# Patient Record
Sex: MALE | Race: WHITE | NOT HISPANIC OR LATINO | Employment: FULL TIME | URBAN - METROPOLITAN AREA
[De-identification: names, ages, dates, MRNs, and addresses within clinical notes are randomized per-mention and may not be internally consistent; named-entity substitution may affect disease eponyms.]

---

## 2022-11-07 ENCOUNTER — HOSPITAL ENCOUNTER (EMERGENCY)
Facility: CLINIC | Age: 62
Discharge: HOME | End: 2022-11-07
Attending: EMERGENCY MEDICINE | Admitting: EMERGENCY MEDICINE

## 2022-11-07 ENCOUNTER — APPOINTMENT (OUTPATIENT)
Dept: ULTRASOUND IMAGING | Facility: CLINIC | Age: 62
End: 2022-11-07
Attending: EMERGENCY MEDICINE

## 2022-11-07 VITALS
DIASTOLIC BLOOD PRESSURE: 90 MMHG | TEMPERATURE: 98 F | HEART RATE: 87 BPM | SYSTOLIC BLOOD PRESSURE: 145 MMHG | WEIGHT: 150 LBS | OXYGEN SATURATION: 98 % | RESPIRATION RATE: 18 BRPM

## 2022-11-07 DIAGNOSIS — L03.115 CELLULITIS OF RIGHT LEG: ICD-10-CM

## 2022-11-07 DIAGNOSIS — T16.1XXA FOREIGN BODY OF RIGHT EAR, INITIAL ENCOUNTER: ICD-10-CM

## 2022-11-07 PROCEDURE — 76882 US LMTD JT/FCL EVL NVASC XTR: CPT | Mod: RT

## 2022-11-07 PROCEDURE — 99284 EMERGENCY DEPT VISIT MOD MDM: CPT | Mod: 25

## 2022-11-07 PROCEDURE — 99284 EMERGENCY DEPT VISIT MOD MDM: CPT | Performed by: EMERGENCY MEDICINE

## 2022-11-07 RX ORDER — SULFAMETHOXAZOLE/TRIMETHOPRIM 800-160 MG
2 TABLET ORAL 2 TIMES DAILY
COMMUNITY
End: 2023-12-03

## 2022-11-07 RX ORDER — MECLIZINE HCL 25MG 25 MG/1
25 TABLET, CHEWABLE ORAL EVERY 6 HOURS PRN
Status: ON HOLD | COMMUNITY
End: 2024-01-28

## 2022-11-07 RX ORDER — SULFAMETHOXAZOLE/TRIMETHOPRIM 800-160 MG
1 TABLET ORAL 2 TIMES DAILY
Qty: 20 TABLET | Refills: 0 | Status: SHIPPED | OUTPATIENT
Start: 2022-11-07 | End: 2022-11-17

## 2022-11-07 ASSESSMENT — ACTIVITIES OF DAILY LIVING (ADL): ADLS_ACUITY_SCORE: 35

## 2022-11-07 NOTE — DISCHARGE INSTRUCTIONS
The ultrasound does not show any abscess that needs to be drained.  You do have infection of the skin called cellulitis.  With your history of MRSA working to cover you with Bactrim DS twice daily for 10 days.  Continue the warm compresses or heating pad to increase the blood flow in the area.  This will promote healing and hopefully clear up the infection faster.  The antibiotic will take a couple of days to be effective so if symptoms are worsening thereafter or you develop high fevers vomiting or other concerns you can return to the ER.

## 2022-11-07 NOTE — ED PROVIDER NOTES
History     Chief Complaint   Patient presents with     Foreign Body in Ear     HPI  Tee Escudero is a 62 year old male who presents with concerns for possible piece of his hearing aid in his right ear canal.  Denies ear pain or drainage.  No fever or chills.  No headache.  Other concern is a reddened swollen area of his right lower leg with a previous history of MRSA abscess requiring repeated surgeries and 3 weeks of IV antibiotics.  Denies fever or chills.  No injury.  Has been using hot pack over the area with improvement in some of the redness.  No other rash or skin lesions are evident.     Allergies:  No Known Allergies    Problem List:    There are no problems to display for this patient.       Past Medical History:    No past medical history on file.    Past Surgical History:    No past surgical history on file.    Family History:    No family history on file.    Social History:  Marital Status:  Single [1]        Medications:    meclizine 25 MG CHEW  sulfamethoxazole-trimethoprim (BACTRIM DS) 800-160 MG tablet  sulfamethoxazole-trimethoprim (BACTRIM DS) 800-160 MG tablet  UNKNOWN TO PATIENT          Review of Systems all other systems are reviewed and are negative.    Physical Exam   BP: (!) 145/90  Pulse: 87  Temp: 98  F (36.7  C)  Resp: 18  Weight: 68 kg (150 lb)  SpO2: 98 %      Physical Exam General alert cooperative male in mild distress.  HEENT reveals a foreign body in the right ear.  No signs of infection.  On his right leg he has a reddened tender area that is nonfluctuant.  Does not involve the joint.  No drainage.  No lymphangitic spread.        ED Course                 Procedures              Critical Care time:  none               Results for orders placed or performed during the hospital encounter of 11/07/22 (from the past 24 hour(s))   US Lower Extremity Non Vascular Right    Narrative    ULTRASOUND RIGHT LOWER EXTREMITY NON VASCULAR November 7, 2022 2:59 PM      HISTORY: Right lower  leg swelling with history of MRSA /rule out  abscess.    COMPARISON: None.     FINDINGS: Limited ultrasound of the right lateral knee area of redness  demonstrates subcutaneous edema and heterogeneously hypervascular  hypoechoic area which could represent cellulitis. Minimal fluid is  present but no drainable abscess is seen. One nodular area in this  region measures 1.1 x 0.7 x 0.8 cm.      Impression    IMPRESSION: Edema and hypervascularity in the subcutaneous adipose  tissues with one nodular area measuring up to 1.1 x 0.7 x 0.8 cm. This  likely represents an inflammatory or infectious process (cellulitis)  but no drainable abscess is seen.       Medications - No data to display  Ear was irrigated to remove foreign body.  Ultrasound of the right leg is ordered.  Assessments & Plan (with Medical Decision Making)   Tee Escudero is a 62 year old male who presents with concerns for possible piece of his hearing aid in his right ear canal.  Denies ear pain or drainage.  No fever or chills.  No headache.  Other concern is a reddened swollen area of his right lower leg with a previous history of MRSA abscess requiring repeated surgeries and 3 weeks of IV antibiotics.  Denies fever or chills.  No injury.  Has been using hot pack over the area with improvement in some of the redness.  No other rash or skin lesions are evident.  Presentation patient was afebrile and vitally stable.  On exam there is a foreign body in the right ear canal but right by the TM.  Will be difficult to reach with a alligator forceps.  Irrigation.  On his right leg he does have a tender reddened area but it is nonfluctuant.  With a previous history of MRSA abscess and ultrasound was obtained to look for drainable lesion and this showed no drainable abscess.  With a alligator clip I was able to remove the foreign body for several year without any additional damage or persistent foreign body.  With history of MRSA we will cover him with Bactrim  twice daily for 10 days.  Continue hot packs.  Follow-up if worsening symptoms.  I have reviewed the nursing notes.    I have reviewed the findings, diagnosis, plan and need for follow up with the patient.       New Prescriptions    SULFAMETHOXAZOLE-TRIMETHOPRIM (BACTRIM DS) 800-160 MG TABLET    Take 1 tablet by mouth 2 times daily for 10 days       Final diagnoses:   Foreign body of right ear, initial encounter   Cellulitis of right leg       11/7/2022   [facility] EMERGENCY DEPT     [place] MD  11/07/22 0802

## 2023-12-03 ENCOUNTER — HOSPITAL ENCOUNTER (EMERGENCY)
Facility: CLINIC | Age: 63
Discharge: HOME | End: 2023-12-03
Attending: PHYSICIAN ASSISTANT | Admitting: PHYSICIAN ASSISTANT

## 2023-12-03 VITALS
HEART RATE: 76 BPM | TEMPERATURE: 97.3 F | BODY MASS INDEX: 28.04 KG/M2 | HEIGHT: 71 IN | RESPIRATION RATE: 20 BRPM | DIASTOLIC BLOOD PRESSURE: 95 MMHG | WEIGHT: 200.3 LBS | OXYGEN SATURATION: 92 % | SYSTOLIC BLOOD PRESSURE: 145 MMHG

## 2023-12-03 DIAGNOSIS — L02.416 ABSCESS OF LEFT LOWER LEG: ICD-10-CM

## 2023-12-03 PROCEDURE — 10060 I&D ABSCESS SIMPLE/SINGLE: CPT | Mod: LT | Performed by: PHYSICIAN ASSISTANT

## 2023-12-03 PROCEDURE — 99284 EMERGENCY DEPT VISIT MOD MDM: CPT | Mod: 25 | Performed by: PHYSICIAN ASSISTANT

## 2023-12-03 PROCEDURE — 99283 EMERGENCY DEPT VISIT LOW MDM: CPT | Mod: 25 | Performed by: PHYSICIAN ASSISTANT

## 2023-12-03 RX ORDER — SULFAMETHOXAZOLE/TRIMETHOPRIM 800-160 MG
2 TABLET ORAL 2 TIMES DAILY
Qty: 28 TABLET | Refills: 0 | Status: SHIPPED | OUTPATIENT
Start: 2023-12-03 | End: 2023-12-10

## 2023-12-03 ASSESSMENT — ACTIVITIES OF DAILY LIVING (ADL): ADLS_ACUITY_SCORE: 33

## 2023-12-03 NOTE — ED PROVIDER NOTES
"  History     Chief Complaint   Patient presents with    Wound Check     HPI  Tee Escudero is a 63 year old male who presents to the emergency department for a wound evaluation.  About a week ago he scratched his left anterior lower leg.  He applied antibiotic ointment and has been trying to keep the wound clean but unfortunately he thinks it may have gotten infected.  He has developed increased redness and swelling to the area with increased pain.  He has not had any fevers and otherwise has been doing well.  He does have a history of MRSA infections.    Allergies:  No Known Allergies    Problem List:    There are no problems to display for this patient.       Past Medical History:    No past medical history on file.    Past Surgical History:    No past surgical history on file.    Family History:    No family history on file.    Social History:  Marital Status:  Single [1]        Medications:    sulfamethoxazole-trimethoprim (BACTRIM DS) 800-160 MG tablet  meclizine 25 MG CHEW  UNKNOWN TO PATIENT          Review of Systems   All other systems reviewed and are negative.        Physical Exam   BP: (!) 145/95  Pulse: 76  Temp: 97.3  F (36.3  C)  Resp: 20  Height: 180.3 cm (5' 11\")  Weight: 90.9 kg (200 lb 4.8 oz)  SpO2: 92 %      Physical Exam  Vitals and nursing note reviewed.   Constitutional:       General: He is not in acute distress.     Appearance: Normal appearance. He is not ill-appearing, toxic-appearing or diaphoretic.   HENT:      Head: Normocephalic and atraumatic.      Nose: Nose normal.   Eyes:      Extraocular Movements: Extraocular movements intact.      Conjunctiva/sclera: Conjunctivae normal.   Pulmonary:      Effort: Pulmonary effort is normal. No respiratory distress.   Musculoskeletal:      Cervical back: Neck supple.      Comments: Left lower le cm area of dark erythema with fluctuance to distal shin noted with 4 cm diameter of erythema and warmth noted.   Skin:     General: Skin is warm " and dry.   Neurological:      General: No focal deficit present.      Mental Status: He is alert. Mental status is at baseline.   Psychiatric:         Mood and Affect: Mood normal.           ED Course            [facility]    PROCEDURE: -Incision/Drainage    Date/Time: 12/3/2023 2:07 PM    Performed by: Carmen Cotton PA-C  Authorized by: Carmen Cotton PA-C    Risks, benefits and alternatives discussed.      LOCATION:      Type:  Abscess    Location:  Lower extremity    Lower extremity location:  Leg    Leg location:  L lower leg    PRE-PROCEDURE DETAILS:     Skin preparation:  Chloraprep    PROCEDURE TYPE:     Complexity:  Simple    ANESTHESIA (see MAR for exact dosages):     Anesthesia method:  Local infiltration    Local anesthetic:  Lidocaine 1% w/o epi    PROCEDURE DETAILS:     Incision types:  Stab incision    Incision depth:  Dermal    Scalpel blade:  11    Wound management:  Probed and deloculated    Drainage:  Bloody and purulent    Drainage amount:  Moderate    Wound treatment:  Wound left open    Packing materials:  None    PROCEDURE    Patient Tolerance:  Patient tolerated the procedure well with no immediate complications        No results found for this or any previous visit (from the past 24 hour(s)).    Medications - No data to display      Assessments & Plan (with Medical Decision Making)  Tee Escudero is a 63 year old male who presented to the ED for a wound to his left lower leg.  Injured himself a week ago and has since developed redness and swelling.  On exam today he did have findings of abscess with surrounding cellulitis.  I went over treatment options and he was agreeable to incision and drainage.  This was performed as detailed above with good results.  I will prescribe him Bactrim for additional antibiotic therapy of this cellulitis/abscess given his MRSA history.  Patient was provided instructions on wound cares as well as indications of  when to return to the ED.  All questions answered and patient discharged home in suitable condition.     I have reviewed the nursing notes.    I have reviewed the findings, diagnosis, plan and need for follow up with the patient.      Current Discharge Medication List          Final diagnoses:   Abscess of left lower leg     Note: Chart documentation done in part with Dragon Voice Recognition software. Although reviewed after completion, some word and grammatical errors may remain.     12/3/2023   [facility] EMERGENCY DEPT       Carmen Cotton PA-C  12/03/23 1419

## 2023-12-03 NOTE — ED TRIAGE NOTES
Has a wound to the left ankle area that is red and swollen     Triage Assessment (Adult)       Row Name 12/03/23 1240          Triage Assessment    Airway WDL WDL        Respiratory WDL    Respiratory WDL WDL        Skin Circulation/Temperature WDL    Skin Circulation/Temperature WDL WDL        Cardiac WDL    Cardiac WDL WDL        Peripheral/Neurovascular WDL    Peripheral Neurovascular WDL WDL        Cognitive/Neuro/Behavioral WDL    Cognitive/Neuro/Behavioral WDL WDL

## 2023-12-03 NOTE — DISCHARGE INSTRUCTIONS
Please take the full course of antibiotics to treat the skin infection.  Keep the wound covered with antibiotic ointment and a bandage.  You can soak in a bathtub to help with drainage.  If you have any worsening symptoms please return to the emergency department.    Thank you for choosing [facility]'s Emergency Department. It was a pleasure taking care of you today. If you have any questions, please call [phone].    Carmen Cotton PA-C

## 2024-01-25 ENCOUNTER — APPOINTMENT (OUTPATIENT)
Dept: CT IMAGING | Facility: CLINIC | Age: 64
End: 2024-01-25
Attending: PHYSICIAN ASSISTANT

## 2024-01-25 ENCOUNTER — HOSPITAL ENCOUNTER (EMERGENCY)
Facility: CLINIC | Age: 64
Discharge: OTHER INSTITUTION | End: 2024-01-26
Attending: PHYSICIAN ASSISTANT | Admitting: PHYSICIAN ASSISTANT

## 2024-01-25 DIAGNOSIS — N49.2 CELLULITIS OF SCROTUM: ICD-10-CM

## 2024-01-25 DIAGNOSIS — N13.2 HYDRONEPHROSIS WITH URINARY OBSTRUCTION DUE TO URETERAL CALCULUS: ICD-10-CM

## 2024-01-25 DIAGNOSIS — K61.1 PERIRECTAL CELLULITIS: ICD-10-CM

## 2024-01-25 DIAGNOSIS — N20.1 LEFT URETERAL STONE: ICD-10-CM

## 2024-01-25 LAB
ALBUMIN SERPL BCG-MCNC: 4 G/DL (ref 3.5–5.2)
ALP SERPL-CCNC: 90 U/L (ref 40–150)
ALT SERPL W P-5'-P-CCNC: 24 U/L (ref 0–70)
ANION GAP SERPL CALCULATED.3IONS-SCNC: 16 MMOL/L (ref 7–15)
AST SERPL W P-5'-P-CCNC: 20 U/L (ref 0–45)
BASOPHILS # BLD AUTO: 0.1 10E3/UL (ref 0–0.2)
BASOPHILS NFR BLD AUTO: 0 %
BILIRUB SERPL-MCNC: 0.9 MG/DL
BUN SERPL-MCNC: 27 MG/DL (ref 8–23)
CALCIUM SERPL-MCNC: 9.4 MG/DL (ref 8.8–10.2)
CHLORIDE SERPL-SCNC: 100 MMOL/L (ref 98–107)
CREAT SERPL-MCNC: 2.17 MG/DL (ref 0.67–1.17)
CRP SERPL-MCNC: 177.13 MG/L
DEPRECATED HCO3 PLAS-SCNC: 20 MMOL/L (ref 22–29)
EGFRCR SERPLBLD CKD-EPI 2021: 34 ML/MIN/1.73M2
EOSINOPHIL # BLD AUTO: 0.3 10E3/UL (ref 0–0.7)
EOSINOPHIL NFR BLD AUTO: 1 %
ERYTHROCYTE [DISTWIDTH] IN BLOOD BY AUTOMATED COUNT: 12.7 % (ref 10–15)
GLUCOSE SERPL-MCNC: 91 MG/DL (ref 70–99)
HCT VFR BLD AUTO: 47.2 % (ref 40–53)
HGB BLD-MCNC: 15.8 G/DL (ref 13.3–17.7)
HOLD SPECIMEN: NORMAL
IMM GRANULOCYTES # BLD: 0.2 10E3/UL
IMM GRANULOCYTES NFR BLD: 1 %
LACTATE SERPL-SCNC: 1 MMOL/L (ref 0.7–2)
LYMPHOCYTES # BLD AUTO: 2.8 10E3/UL (ref 0.8–5.3)
LYMPHOCYTES NFR BLD AUTO: 13 %
MCH RBC QN AUTO: 30.3 PG (ref 26.5–33)
MCHC RBC AUTO-ENTMCNC: 33.5 G/DL (ref 31.5–36.5)
MCV RBC AUTO: 90 FL (ref 78–100)
MONOCYTES # BLD AUTO: 2.1 10E3/UL (ref 0–1.3)
MONOCYTES NFR BLD AUTO: 10 %
NEUTROPHILS # BLD AUTO: 16.4 10E3/UL (ref 1.6–8.3)
NEUTROPHILS NFR BLD AUTO: 75 %
NRBC # BLD AUTO: 0 10E3/UL
NRBC BLD AUTO-RTO: 0 /100
PLATELET # BLD AUTO: 231 10E3/UL (ref 150–450)
POTASSIUM SERPL-SCNC: 4.1 MMOL/L (ref 3.4–5.3)
PROT SERPL-MCNC: 7.4 G/DL (ref 6.4–8.3)
RBC # BLD AUTO: 5.22 10E6/UL (ref 4.4–5.9)
SODIUM SERPL-SCNC: 136 MMOL/L (ref 135–145)
WBC # BLD AUTO: 21.7 10E3/UL (ref 4–11)

## 2024-01-25 PROCEDURE — 85025 COMPLETE CBC W/AUTO DIFF WBC: CPT | Performed by: PHYSICIAN ASSISTANT

## 2024-01-25 PROCEDURE — 250N000011 HC RX IP 250 OP 636: Performed by: PHYSICIAN ASSISTANT

## 2024-01-25 PROCEDURE — 86140 C-REACTIVE PROTEIN: CPT | Performed by: PHYSICIAN ASSISTANT

## 2024-01-25 PROCEDURE — 250N000009 HC RX 250: Performed by: PHYSICIAN ASSISTANT

## 2024-01-25 PROCEDURE — 36415 COLL VENOUS BLD VENIPUNCTURE: CPT | Performed by: PHYSICIAN ASSISTANT

## 2024-01-25 PROCEDURE — 81001 URINALYSIS AUTO W/SCOPE: CPT | Performed by: PHYSICIAN ASSISTANT

## 2024-01-25 PROCEDURE — 80053 COMPREHEN METABOLIC PANEL: CPT | Performed by: PHYSICIAN ASSISTANT

## 2024-01-25 PROCEDURE — 74177 CT ABD & PELVIS W/CONTRAST: CPT

## 2024-01-25 PROCEDURE — 99285 EMERGENCY DEPT VISIT HI MDM: CPT | Mod: 25 | Performed by: PHYSICIAN ASSISTANT

## 2024-01-25 PROCEDURE — 258N000003 HC RX IP 258 OP 636: Performed by: PHYSICIAN ASSISTANT

## 2024-01-25 PROCEDURE — 83605 ASSAY OF LACTIC ACID: CPT | Performed by: PHYSICIAN ASSISTANT

## 2024-01-25 PROCEDURE — 99285 EMERGENCY DEPT VISIT HI MDM: CPT | Performed by: PHYSICIAN ASSISTANT

## 2024-01-25 PROCEDURE — 87040 BLOOD CULTURE FOR BACTERIA: CPT | Performed by: PHYSICIAN ASSISTANT

## 2024-01-25 RX ORDER — IOPAMIDOL 755 MG/ML
500 INJECTION, SOLUTION INTRAVASCULAR ONCE
Status: COMPLETED | OUTPATIENT
Start: 2024-01-25 | End: 2024-01-25

## 2024-01-25 RX ORDER — PIPERACILLIN SODIUM, TAZOBACTAM SODIUM 4; .5 G/20ML; G/20ML
4.5 INJECTION, POWDER, LYOPHILIZED, FOR SOLUTION INTRAVENOUS ONCE
Status: COMPLETED | OUTPATIENT
Start: 2024-01-25 | End: 2024-01-26

## 2024-01-25 RX ORDER — VANCOMYCIN HYDROCHLORIDE 500 MG/10ML
500 INJECTION, POWDER, LYOPHILIZED, FOR SOLUTION INTRAVENOUS EVERY 12 HOURS
Status: DISCONTINUED | OUTPATIENT
Start: 2024-01-26 | End: 2024-01-26 | Stop reason: HOSPADM

## 2024-01-25 RX ORDER — CLINDAMYCIN PHOSPHATE 900 MG/50ML
900 INJECTION, SOLUTION INTRAVENOUS ONCE
Status: COMPLETED | OUTPATIENT
Start: 2024-01-25 | End: 2024-01-26

## 2024-01-25 RX ADMIN — SODIUM CHLORIDE 1000 ML: 9 INJECTION, SOLUTION INTRAVENOUS at 22:33

## 2024-01-25 RX ADMIN — IOPAMIDOL 98 ML: 755 INJECTION, SOLUTION INTRAVENOUS at 22:16

## 2024-01-25 RX ADMIN — SODIUM CHLORIDE 60 ML: 9 INJECTION, SOLUTION INTRAVENOUS at 22:17

## 2024-01-25 ASSESSMENT — ACTIVITIES OF DAILY LIVING (ADL)
ADLS_ACUITY_SCORE: 35
ADLS_ACUITY_SCORE: 35

## 2024-01-26 ENCOUNTER — HOSPITAL ENCOUNTER (INPATIENT)
Facility: CLINIC | Age: 64
LOS: 6 days | Discharge: HOME | DRG: 580 | End: 2024-02-01
Attending: HOSPITALIST | Admitting: INTERNAL MEDICINE

## 2024-01-26 ENCOUNTER — TELEPHONE (OUTPATIENT)
Dept: FAMILY MEDICINE | Facility: CLINIC | Age: 64
End: 2024-01-26

## 2024-01-26 VITALS
RESPIRATION RATE: 19 BRPM | BODY MASS INDEX: 28 KG/M2 | SYSTOLIC BLOOD PRESSURE: 105 MMHG | OXYGEN SATURATION: 96 % | DIASTOLIC BLOOD PRESSURE: 68 MMHG | WEIGHT: 200 LBS | TEMPERATURE: 97.9 F | HEART RATE: 75 BPM | HEIGHT: 71 IN

## 2024-01-26 DIAGNOSIS — S31.109A WOUND OF LEFT GROIN, INITIAL ENCOUNTER: ICD-10-CM

## 2024-01-26 DIAGNOSIS — L03.315 CELLULITIS OF PERINEUM: ICD-10-CM

## 2024-01-26 DIAGNOSIS — N20.1 URETERAL STONE: Primary | ICD-10-CM

## 2024-01-26 LAB
ALBUMIN UR-MCNC: NEGATIVE MG/DL
ANION GAP SERPL CALCULATED.3IONS-SCNC: 10 MMOL/L (ref 7–15)
APPEARANCE UR: CLEAR
BILIRUB UR QL STRIP: NEGATIVE
BUN SERPL-MCNC: 22.7 MG/DL (ref 8–23)
CALCIUM SERPL-MCNC: 8.7 MG/DL (ref 8.8–10.2)
CHLORIDE SERPL-SCNC: 103 MMOL/L (ref 98–107)
COLOR UR AUTO: YELLOW
CREAT SERPL-MCNC: 1.62 MG/DL (ref 0.67–1.17)
CREAT SERPL-MCNC: 1.87 MG/DL (ref 0.67–1.17)
DEPRECATED HCO3 PLAS-SCNC: 23 MMOL/L (ref 22–29)
EGFRCR SERPLBLD CKD-EPI 2021: 40 ML/MIN/1.73M2
EGFRCR SERPLBLD CKD-EPI 2021: 48 ML/MIN/1.73M2
ERYTHROCYTE [DISTWIDTH] IN BLOOD BY AUTOMATED COUNT: 12.6 % (ref 10–15)
GLUCOSE SERPL-MCNC: 87 MG/DL (ref 70–99)
GLUCOSE UR STRIP-MCNC: NEGATIVE MG/DL
HCT VFR BLD AUTO: 41.1 % (ref 40–53)
HGB BLD-MCNC: 13.8 G/DL (ref 13.3–17.7)
HGB UR QL STRIP: NEGATIVE
INR PPP: 1.1 (ref 0.85–1.15)
KETONES UR STRIP-MCNC: 5 MG/DL
LEUKOCYTE ESTERASE UR QL STRIP: NEGATIVE
MCH RBC QN AUTO: 30.7 PG (ref 26.5–33)
MCHC RBC AUTO-ENTMCNC: 33.6 G/DL (ref 31.5–36.5)
MCV RBC AUTO: 92 FL (ref 78–100)
MUCOUS THREADS #/AREA URNS LPF: PRESENT /LPF
NITRATE UR QL: NEGATIVE
PH UR STRIP: 5 [PH] (ref 5–7)
PLATELET # BLD AUTO: 212 10E3/UL (ref 150–450)
POTASSIUM SERPL-SCNC: 4.2 MMOL/L (ref 3.4–5.3)
RBC # BLD AUTO: 4.49 10E6/UL (ref 4.4–5.9)
RBC URINE: 1 /HPF
SODIUM SERPL-SCNC: 136 MMOL/L (ref 135–145)
SP GR UR STRIP: 1.01 (ref 1–1.03)
SQUAMOUS EPITHELIAL: 1 /HPF
UROBILINOGEN UR STRIP-MCNC: NORMAL MG/DL
WBC # BLD AUTO: 13.5 10E3/UL (ref 4–11)
WBC URINE: 2 /HPF

## 2024-01-26 PROCEDURE — 82565 ASSAY OF CREATININE: CPT | Performed by: INTERNAL MEDICINE

## 2024-01-26 PROCEDURE — 258N000003 HC RX IP 258 OP 636: Performed by: HOSPITALIST

## 2024-01-26 PROCEDURE — 250N000013 HC RX MED GY IP 250 OP 250 PS 637: Performed by: EMERGENCY MEDICINE

## 2024-01-26 PROCEDURE — 85027 COMPLETE CBC AUTOMATED: CPT | Performed by: INTERNAL MEDICINE

## 2024-01-26 PROCEDURE — 36415 COLL VENOUS BLD VENIPUNCTURE: CPT | Performed by: PHYSICIAN ASSISTANT

## 2024-01-26 PROCEDURE — 250N000011 HC RX IP 250 OP 636: Performed by: PHYSICIAN ASSISTANT

## 2024-01-26 PROCEDURE — 36415 COLL VENOUS BLD VENIPUNCTURE: CPT | Performed by: INTERNAL MEDICINE

## 2024-01-26 PROCEDURE — 99222 1ST HOSP IP/OBS MODERATE 55: CPT | Performed by: UROLOGY

## 2024-01-26 PROCEDURE — 96375 TX/PRO/DX INJ NEW DRUG ADDON: CPT | Performed by: PHYSICIAN ASSISTANT

## 2024-01-26 PROCEDURE — 96366 THER/PROPH/DIAG IV INF ADDON: CPT | Performed by: PHYSICIAN ASSISTANT

## 2024-01-26 PROCEDURE — 250N000013 HC RX MED GY IP 250 OP 250 PS 637: Performed by: INTERNAL MEDICINE

## 2024-01-26 PROCEDURE — 258N000003 HC RX IP 258 OP 636: Performed by: INTERNAL MEDICINE

## 2024-01-26 PROCEDURE — 250N000011 HC RX IP 250 OP 636: Performed by: EMERGENCY MEDICINE

## 2024-01-26 PROCEDURE — 99223 1ST HOSP IP/OBS HIGH 75: CPT | Performed by: INTERNAL MEDICINE

## 2024-01-26 PROCEDURE — 250N000011 HC RX IP 250 OP 636: Performed by: STUDENT IN AN ORGANIZED HEALTH CARE EDUCATION/TRAINING PROGRAM

## 2024-01-26 PROCEDURE — 258N000003 HC RX IP 258 OP 636: Performed by: PHYSICIAN ASSISTANT

## 2024-01-26 PROCEDURE — 96365 THER/PROPH/DIAG IV INF INIT: CPT | Mod: 59 | Performed by: PHYSICIAN ASSISTANT

## 2024-01-26 PROCEDURE — 250N000011 HC RX IP 250 OP 636: Performed by: HOSPITALIST

## 2024-01-26 PROCEDURE — 85610 PROTHROMBIN TIME: CPT | Performed by: INTERNAL MEDICINE

## 2024-01-26 PROCEDURE — 250N000011 HC RX IP 250 OP 636: Performed by: INTERNAL MEDICINE

## 2024-01-26 PROCEDURE — 82565 ASSAY OF CREATININE: CPT | Performed by: PHYSICIAN ASSISTANT

## 2024-01-26 PROCEDURE — 96367 TX/PROPH/DG ADDL SEQ IV INF: CPT | Performed by: PHYSICIAN ASSISTANT

## 2024-01-26 PROCEDURE — 120N000001 HC R&B MED SURG/OB

## 2024-01-26 RX ORDER — POLYETHYLENE GLYCOL 3350 17 G/17G
17 POWDER, FOR SOLUTION ORAL 2 TIMES DAILY PRN
Status: DISCONTINUED | OUTPATIENT
Start: 2024-01-26 | End: 2024-02-01 | Stop reason: HOSPADM

## 2024-01-26 RX ORDER — OXYCODONE HYDROCHLORIDE 5 MG/1
5 TABLET ORAL EVERY 4 HOURS PRN
Status: DISCONTINUED | OUTPATIENT
Start: 2024-01-26 | End: 2024-02-01 | Stop reason: HOSPADM

## 2024-01-26 RX ORDER — AMOXICILLIN 250 MG
2 CAPSULE ORAL 2 TIMES DAILY PRN
Status: DISCONTINUED | OUTPATIENT
Start: 2024-01-26 | End: 2024-02-01 | Stop reason: HOSPADM

## 2024-01-26 RX ORDER — NALOXONE HYDROCHLORIDE 0.4 MG/ML
0.2 INJECTION, SOLUTION INTRAMUSCULAR; INTRAVENOUS; SUBCUTANEOUS
Status: DISCONTINUED | OUTPATIENT
Start: 2024-01-26 | End: 2024-02-01 | Stop reason: HOSPADM

## 2024-01-26 RX ORDER — TAMSULOSIN HYDROCHLORIDE 0.4 MG/1
0.4 CAPSULE ORAL DAILY
Status: DISCONTINUED | OUTPATIENT
Start: 2024-01-26 | End: 2024-02-01 | Stop reason: HOSPADM

## 2024-01-26 RX ORDER — ACETAMINOPHEN 325 MG/1
975 TABLET ORAL EVERY 4 HOURS PRN
Status: DISCONTINUED | OUTPATIENT
Start: 2024-01-26 | End: 2024-01-26 | Stop reason: HOSPADM

## 2024-01-26 RX ORDER — ONDANSETRON 2 MG/ML
4 INJECTION INTRAMUSCULAR; INTRAVENOUS EVERY 6 HOURS PRN
Status: DISCONTINUED | OUTPATIENT
Start: 2024-01-26 | End: 2024-02-01 | Stop reason: HOSPADM

## 2024-01-26 RX ORDER — AMOXICILLIN 250 MG
1 CAPSULE ORAL 2 TIMES DAILY PRN
Status: DISCONTINUED | OUTPATIENT
Start: 2024-01-26 | End: 2024-02-01 | Stop reason: HOSPADM

## 2024-01-26 RX ORDER — ACETAMINOPHEN 650 MG/1
650 SUPPOSITORY RECTAL EVERY 4 HOURS PRN
Status: DISCONTINUED | OUTPATIENT
Start: 2024-01-26 | End: 2024-02-01 | Stop reason: HOSPADM

## 2024-01-26 RX ORDER — SODIUM CHLORIDE, SODIUM LACTATE, POTASSIUM CHLORIDE, CALCIUM CHLORIDE 600; 310; 30; 20 MG/100ML; MG/100ML; MG/100ML; MG/100ML
INJECTION, SOLUTION INTRAVENOUS CONTINUOUS
Status: DISCONTINUED | OUTPATIENT
Start: 2024-01-26 | End: 2024-01-29

## 2024-01-26 RX ORDER — IBUPROFEN 200 MG
400 TABLET ORAL DAILY PRN
Status: ON HOLD | COMMUNITY
End: 2024-01-28

## 2024-01-26 RX ORDER — CLINDAMYCIN PHOSPHATE 900 MG/50ML
900 INJECTION, SOLUTION INTRAVENOUS EVERY 8 HOURS
Status: DISCONTINUED | OUTPATIENT
Start: 2024-01-26 | End: 2024-01-28

## 2024-01-26 RX ORDER — HYDROMORPHONE HCL IN WATER/PF 6 MG/30 ML
0.2 PATIENT CONTROLLED ANALGESIA SYRINGE INTRAVENOUS
Status: DISCONTINUED | OUTPATIENT
Start: 2024-01-26 | End: 2024-02-01 | Stop reason: HOSPADM

## 2024-01-26 RX ORDER — ONDANSETRON 4 MG/1
4 TABLET, ORALLY DISINTEGRATING ORAL EVERY 6 HOURS PRN
Status: DISCONTINUED | OUTPATIENT
Start: 2024-01-26 | End: 2024-02-01 | Stop reason: HOSPADM

## 2024-01-26 RX ORDER — PIPERACILLIN SODIUM, TAZOBACTAM SODIUM 3; .375 G/15ML; G/15ML
3.38 INJECTION, POWDER, LYOPHILIZED, FOR SOLUTION INTRAVENOUS EVERY 6 HOURS
Status: DISCONTINUED | OUTPATIENT
Start: 2024-01-26 | End: 2024-01-26 | Stop reason: HOSPADM

## 2024-01-26 RX ORDER — NALOXONE HYDROCHLORIDE 0.4 MG/ML
0.4 INJECTION, SOLUTION INTRAMUSCULAR; INTRAVENOUS; SUBCUTANEOUS
Status: DISCONTINUED | OUTPATIENT
Start: 2024-01-26 | End: 2024-02-01 | Stop reason: HOSPADM

## 2024-01-26 RX ORDER — ONDANSETRON 2 MG/ML
4 INJECTION INTRAMUSCULAR; INTRAVENOUS ONCE
Status: COMPLETED | OUTPATIENT
Start: 2024-01-26 | End: 2024-01-26

## 2024-01-26 RX ORDER — PIPERACILLIN SODIUM, TAZOBACTAM SODIUM 3; .375 G/15ML; G/15ML
3.38 INJECTION, POWDER, LYOPHILIZED, FOR SOLUTION INTRAVENOUS EVERY 6 HOURS
Status: DISCONTINUED | OUTPATIENT
Start: 2024-01-26 | End: 2024-01-28

## 2024-01-26 RX ORDER — HYDROMORPHONE HCL IN WATER/PF 6 MG/30 ML
0.4 PATIENT CONTROLLED ANALGESIA SYRINGE INTRAVENOUS
Status: DISCONTINUED | OUTPATIENT
Start: 2024-01-26 | End: 2024-02-01 | Stop reason: HOSPADM

## 2024-01-26 RX ORDER — LIDOCAINE 40 MG/G
CREAM TOPICAL
Status: DISCONTINUED | OUTPATIENT
Start: 2024-01-26 | End: 2024-02-01 | Stop reason: HOSPADM

## 2024-01-26 RX ORDER — CLINDAMYCIN PHOSPHATE 900 MG/50ML
900 INJECTION, SOLUTION INTRAVENOUS EVERY 8 HOURS
Status: DISCONTINUED | OUTPATIENT
Start: 2024-01-26 | End: 2024-01-26

## 2024-01-26 RX ORDER — CALCIUM CARBONATE 500 MG/1
1000 TABLET, CHEWABLE ORAL 4 TIMES DAILY PRN
Status: DISCONTINUED | OUTPATIENT
Start: 2024-01-26 | End: 2024-02-01 | Stop reason: HOSPADM

## 2024-01-26 RX ORDER — ACETAMINOPHEN 325 MG/1
650 TABLET ORAL EVERY 4 HOURS PRN
Status: DISCONTINUED | OUTPATIENT
Start: 2024-01-26 | End: 2024-02-01 | Stop reason: HOSPADM

## 2024-01-26 RX ADMIN — CLINDAMYCIN PHOSPHATE 900 MG: 900 INJECTION, SOLUTION INTRAVENOUS at 00:34

## 2024-01-26 RX ADMIN — ACETAMINOPHEN 975 MG: 325 TABLET, FILM COATED ORAL at 09:33

## 2024-01-26 RX ADMIN — VANCOMYCIN HYDROCHLORIDE 1250 MG: 5 INJECTION, POWDER, LYOPHILIZED, FOR SOLUTION INTRAVENOUS at 23:33

## 2024-01-26 RX ADMIN — PIPERACILLIN AND TAZOBACTAM 4.5 G: 4; .5 INJECTION, POWDER, FOR SOLUTION INTRAVENOUS at 00:00

## 2024-01-26 RX ADMIN — VANCOMYCIN HYDROCHLORIDE 500 MG: 500 INJECTION, POWDER, LYOPHILIZED, FOR SOLUTION INTRAVENOUS at 11:41

## 2024-01-26 RX ADMIN — PIPERACILLIN AND TAZOBACTAM 3.38 G: 3; .375 INJECTION, POWDER, FOR SOLUTION INTRAVENOUS at 13:22

## 2024-01-26 RX ADMIN — OXYCODONE HYDROCHLORIDE 5 MG: 5 TABLET ORAL at 23:46

## 2024-01-26 RX ADMIN — TAMSULOSIN HYDROCHLORIDE 0.4 MG: 0.4 CAPSULE ORAL at 21:14

## 2024-01-26 RX ADMIN — CLINDAMYCIN PHOSPHATE 900 MG: 900 INJECTION, SOLUTION INTRAVENOUS at 21:14

## 2024-01-26 RX ADMIN — SODIUM CHLORIDE, POTASSIUM CHLORIDE, SODIUM LACTATE AND CALCIUM CHLORIDE: 600; 310; 30; 20 INJECTION, SOLUTION INTRAVENOUS at 20:11

## 2024-01-26 RX ADMIN — CLINDAMYCIN PHOSPHATE 900 MG: 900 INJECTION, SOLUTION INTRAVENOUS at 07:45

## 2024-01-26 RX ADMIN — PIPERACILLIN AND TAZOBACTAM 3.38 G: 3; .375 INJECTION, POWDER, FOR SOLUTION INTRAVENOUS at 22:50

## 2024-01-26 RX ADMIN — PIPERACILLIN AND TAZOBACTAM 3.38 G: 3; .375 INJECTION, POWDER, FOR SOLUTION INTRAVENOUS at 06:42

## 2024-01-26 RX ADMIN — ACETAMINOPHEN 650 MG: 325 TABLET, FILM COATED ORAL at 23:45

## 2024-01-26 RX ADMIN — ONDANSETRON 4 MG: 2 INJECTION INTRAMUSCULAR; INTRAVENOUS at 11:27

## 2024-01-26 RX ADMIN — VANCOMYCIN HYDROCHLORIDE 1500 MG: 1 INJECTION, POWDER, LYOPHILIZED, FOR SOLUTION INTRAVENOUS at 01:05

## 2024-01-26 ASSESSMENT — ACTIVITIES OF DAILY LIVING (ADL)
ADLS_ACUITY_SCORE: 35
ADLS_ACUITY_SCORE: 35
WEAR_GLASSES_OR_BLIND: YES
DIFFICULTY_EATING/SWALLOWING: NO
ADLS_ACUITY_SCORE: 20
ADLS_ACUITY_SCORE: 35
CHANGE_IN_FUNCTIONAL_STATUS_SINCE_ONSET_OF_CURRENT_ILLNESS/INJURY: NO
DRESSING/BATHING_DIFFICULTY: NO
FALL_HISTORY_WITHIN_LAST_SIX_MONTHS: NO
ADLS_ACUITY_SCORE: 35
ADLS_ACUITY_SCORE: 20
HEARING_DIFFICULTY_OR_DEAF: NO
DOING_ERRANDS_INDEPENDENTLY_DIFFICULTY: NO
VISION_MANAGEMENT: GLASSES
DIFFICULTY_COMMUNICATING: NO
ADLS_ACUITY_SCORE: 35
ADLS_ACUITY_SCORE: 35
WALKING_OR_CLIMBING_STAIRS_DIFFICULTY: NO
CONCENTRATING,_REMEMBERING_OR_MAKING_DECISIONS_DIFFICULTY: NO
ADLS_ACUITY_SCORE: 35
TOILETING_ISSUES: NO
ADLS_ACUITY_SCORE: 35
ADLS_ACUITY_SCORE: 35

## 2024-01-26 NOTE — ED TRIAGE NOTES
Pt here for evaluation of left buttock/groin abscess. Hx of multiple abscesses and MRSA in the past. Current abscess present for the past 2 days. Subjective fever, diaphoresis, and chills per pt report. 97.4 oral temp in triage.     Triage Assessment (Adult)       Row Name 01/25/24 2055          Triage Assessment    Airway WDL WDL        Respiratory WDL    Respiratory WDL WDL        Skin Circulation/Temperature WDL    Skin Circulation/Temperature WDL WDL        Cardiac WDL    Cardiac WDL WDL        Peripheral/Neurovascular WDL    Peripheral Neurovascular WDL WDL        Cognitive/Neuro/Behavioral WDL    Cognitive/Neuro/Behavioral WDL WDL

## 2024-01-26 NOTE — TELEPHONE ENCOUNTER
Direct Admit   Direct admission sign out:     Bed needed: med bed  No sitter needed  Consultants needed: general surgery and urology   Sign out received from Dr. Dobbins    Brief HPI: Tee Escudero is a 64-year-old male with a history of previous abscesses requiring I&D, MRSA infections, and sepsis who presented to the emergency department with pain and swelling in the left perirectal area.  Found to have a perirectal and scrotal cellulitis. seen by surgery [facility] who recommended broad-spectrum antibiotics and a urology consult.  No evidence of sepsis or Dipesh's gangrene at this time.     Pertinent findings: Leukocytosis of 21.7, elevated creatinine 2.17, , normal lactic acid.  CT abdomen pelvis shows diffuse scrotal thickening and edema that extends posteriorly from the scrotum to the medial aspect of the left thigh without evidence of drainable abscess, no evidence of gas within the soft tissues.  Also incidental left kidney hydronephrosis with calculi.    Pertinent treatment: General surgery consulted at [facility] recommending broad antibiotics with clindamycin, Vanco and Zosyn     Request for direct admission due to perirectal and scrotal cellulitis with incidental left-sided hydronephrosis with calculi found on CT imaging.  [facility] ER unfortunately does not have urology services and no inpatient beds.

## 2024-01-26 NOTE — ED PROVIDER NOTES
Patient was signed out to me at shift change.  Please see original note regarding details of presentation.  In brief, this is a 64-year-old male with no history of MRSA and recurrent abscesses who presents for a new area of pain and swelling to the left perineal/inguinal region.  Labs revealed leukocytosis at 21.7, elevated CRP, and a normal lactic acid.  CT showed an area of diffuse edema to the scrotum to the medial aspect of the left thigh and gluteal region without clear evidence of a drainable abscess or gas within the soft tissues.  Separately, CT also showed findings of chronic appearing ureteral abnormalities and hydronephrosis.  Patient does endorse a history of kidney stones but this finding is new to him and he also has signs of MUKUL, creatinine initially above 2.  Case was discussed with general surgery at our facility and it was recommended that he be transferred to a facility with general surgery and urology for the above complaints.  Patient had already received broad-spectrum antibiotics including vancomycin, Zosyn, and clindamycin due to the possibility of developing Dipesh's.  At the time of my exam, patient was resting comfortably in bed.  He did have an area of tenderness to palpation in the left groin region as described above.  It does feel somewhat fluctuant in places but it remains without crepitus, any signs of necrosis, or other appreciable abnormalities on visual inspection.  Patient rested comfortably in bed overnight.  At times his blood pressures were soft but they normalized when he was awoken and were stable with fluids.  He denies any changes in symptoms.  We are awaiting transfer to higher level of care.  Unfortunately, there are no beds available within the [place] system.  We also reached out to all local health systems and have not yet found a place for him.  Antibiotics were ordered on a scheduled basis.  He remains hemodynamically stable at shift change.  Patient will be  signed out to my colleague, Dr. Dobbins, to follow-up on disposition.     Joaquin Andres MD  01/26/24 5813

## 2024-01-26 NOTE — ED PROVIDER NOTES
"  History     Chief Complaint   Patient presents with    Abscess     HPI  Tee Escudero is a 64 year old male who presents with left groin abscess onset 3 days ago. Has history of 25-30 previous abscesses that needed incision and drainage including past infection with MRSA that developed to sepsis. Today experienced subjective fever, chills, sweats, and lightheadedness. Did take 4-5 tabs of leftover Bactrim he had at home. Left testicle is also painful. Notes the area of redness and pain has been spreading up his groin. Believes part of it \"popped\" tonight and leaked fluid onto his pants. No vomiting. Decreased appetite today. No urinary symptoms, flank pain, or abdominal pain.  Had a history of kidney stone 15 years ago which she reports passing.  States that his urine did turn more of a darker color today, but was more yellow.  Denies any gross hematuria.  Denies any history of diabetes.    Allergies:  No Known Allergies    Problem List:    There are no problems to display for this patient.       Past Medical History:    No past medical history on file.    Past Surgical History:    No past surgical history on file.    Family History:    No family history on file.    Social History:  Marital Status:  Single [1]        Medications:    meclizine 25 MG CHEW  UNKNOWN TO PATIENT          Review of Systems   All other systems reviewed and are negative.      Physical Exam   BP: (!) 137/106  Pulse: 55  Temp: 97.4  F (36.3  C)  Resp: 18  Height: 180.3 cm (5' 11\")  Weight: 90.7 kg (200 lb)  SpO2: 99 %      Physical Exam  Vitals and nursing note reviewed.   Constitutional:       General: He is not in acute distress.     Appearance: He is not ill-appearing or diaphoretic.   HENT:      Head: Normocephalic and atraumatic.      Right Ear: External ear normal.      Left Ear: External ear normal.      Nose: Nose normal.      Mouth/Throat:      Pharynx: No oropharyngeal exudate.   Eyes:      General: No scleral icterus.        " Right eye: No discharge.         Left eye: No discharge.      Conjunctiva/sclera: Conjunctivae normal.      Pupils: Pupils are equal, round, and reactive to light.   Neck:      Thyroid: No thyromegaly.   Cardiovascular:      Rate and Rhythm: Normal rate and regular rhythm.      Heart sounds: Normal heart sounds. No murmur heard.  Pulmonary:      Effort: Pulmonary effort is normal. No respiratory distress.      Breath sounds: Normal breath sounds. No wheezing or rales.   Chest:      Chest wall: No tenderness.   Abdominal:      General: Bowel sounds are normal. There is no distension.      Palpations: Abdomen is soft. There is no mass.      Tenderness: There is no abdominal tenderness. There is no right CVA tenderness, left CVA tenderness, guarding or rebound.   Genitourinary:     Comments: Indurated in left perirectal space with erythema spreading into inguinal fold and partially into left scrotum. Area is tender to palpation.  No identifiable abscess that could be incised at this time.  I do not identify where any drainage would have come from earlier.  Musculoskeletal:         General: No tenderness or deformity. Normal range of motion.      Cervical back: Normal range of motion and neck supple.   Lymphadenopathy:      Cervical: No cervical adenopathy.   Skin:     General: Skin is warm and dry.      Capillary Refill: Capillary refill takes less than 2 seconds.      Coloration: Skin is pale.      Findings: No erythema or rash.   Neurological:      General: No focal deficit present.      Mental Status: He is alert and oriented to person, place, and time.      Cranial Nerves: No cranial nerve deficit.   Psychiatric:         Behavior: Behavior normal.         Thought Content: Thought content normal.         ED Course                 Procedures                Results for orders placed or performed during the hospital encounter of 01/25/24 (from the past 24 hour(s))   CBC with platelets differential    Narrative    The  following orders were created for panel order CBC with platelets differential.  Procedure                               Abnormality         Status                     ---------                               -----------         ------                     CBC with platelets and d...[[id number]]  Abnormal            Final result                 Please view results for these tests on the individual orders.   Comprehensive metabolic panel   Result Value Ref Range    Sodium 136 135 - 145 mmol/L    Potassium 4.1 3.4 - 5.3 mmol/L    Carbon Dioxide (CO2) 20 (L) 22 - 29 mmol/L    Anion Gap 16 (H) 7 - 15 mmol/L    Urea Nitrogen 27.0 (H) 8.0 - 23.0 mg/dL    Creatinine 2.17 (H) 0.67 - 1.17 mg/dL    GFR Estimate 34 (L) >60 mL/min/1.73m2    Calcium 9.4 8.8 - 10.2 mg/dL    Chloride 100 98 - 107 mmol/L    Glucose 91 70 - 99 mg/dL    Alkaline Phosphatase 90 40 - 150 U/L    AST 20 0 - 45 U/L    ALT 24 0 - 70 U/L    Protein Total 7.4 6.4 - 8.3 g/dL    Albumin 4.0 3.5 - 5.2 g/dL    Bilirubin Total 0.9 <=1.2 mg/dL   CRP inflammation   Result Value Ref Range    CRP Inflammation 177.13 (H) <5.00 mg/L   Lactic acid whole blood w/ reflex x1 in 3 Hr when >2   Result Value Ref Range    Lactic Acid, Initial 1.0 0.7 - 2.0 mmol/L   CBC with platelets and differential   Result Value Ref Range    WBC Count 21.7 (H) 4.0 - 11.0 10e3/uL    RBC Count 5.22 4.40 - 5.90 10e6/uL    Hemoglobin 15.8 13.3 - 17.7 g/dL    Hematocrit 47.2 40.0 - 53.0 %    MCV 90 78 - 100 fL    MCH 30.3 26.5 - 33.0 pg    MCHC 33.5 31.5 - 36.5 g/dL    RDW 12.7 10.0 - 15.0 %    Platelet Count 231 150 - 450 10e3/uL    % Neutrophils 75 %    % Lymphocytes 13 %    % Monocytes 10 %    % Eosinophils 1 %    % Basophils 0 %    % Immature Granulocytes 1 %    NRBCs per 100 WBC 0 <1 /100    Absolute Neutrophils 16.4 (H) 1.6 - 8.3 10e3/uL    Absolute Lymphocytes 2.8 0.8 - 5.3 10e3/uL    Absolute Monocytes 2.1 (H) 0.0 - 1.3 10e3/uL    Absolute Eosinophils 0.3 0.0 - 0.7 10e3/uL    Absolute  Basophils 0.1 0.0 - 0.2 10e3/uL    Absolute Immature Granulocytes 0.2 <=0.4 10e3/uL    Absolute NRBCs 0.0 10e3/uL   [place] Draw    Narrative    The following orders were created for panel order [place] Draw.  Procedure                               Abnormality         Status                     ---------                               -----------         ------                     Extra Blood Culture Bottle[[id number]]                       Final result                 Please view results for these tests on the individual orders.   Extra Blood Culture Bottle   Result Value Ref Range    Hold Specimen JIC    CT Abdomen Pelvis w Contrast    Narrative    EXAM: CT ABDOMEN PELVIS W CONTRAST  LOCATION: [facility]  DATE: 1/25/2024    INDICATION: Left perirectal and groin area abscess vs cellulitis, r/o Dipesh's gangrene.  COMPARISON: None.  TECHNIQUE: CT scan of the abdomen and pelvis was performed following injection of IV contrast. Multiplanar reformats were obtained. Dose reduction techniques were used.  CONTRAST: Isovue 370, 98mL    FINDINGS:   LOWER CHEST: Minimal bibasilar atelectasis. Coronary artery calcification.    HEPATOBILIARY: Hepatic steatosis. Gallbladder and biliary system unremarkable.    PANCREAS: Normal.    SPLEEN: Normal.    ADRENAL GLANDS: Normal.    KIDNEYS/BLADDER: Moderate-to-marked left renal atrophy with significant cortical scarring. Mild-to-moderate left-sided hydronephrosis with mild left ureteral dilatation down to the level of a distal left ureteral conglomeration of calculi extending over   a length of 1.6 cm. The largest distal calculus measures 0.9 x 0.6 cm in this group. No right-sided hydronephrosis or ureteral calculi. Decompressed bladder.    BOWEL: No obstruction or inflammatory change. Appendix unremarkable.    LYMPH NODES: Enlarged left inguinal and external iliac chain lymph nodes, likely reactive.    VASCULATURE: Normal-caliber aorta. Minimal  vascular calcification.    PELVIC ORGANS: Diffuse scrotal skin thickening with scrotal edema, greater on the left. Edema extends posterior from the scrotum into the medial aspect of the left thigh and superiorly along the left inguinal canal. No evidence for gas within the soft   tissues. No drainable subcutaneous fluid collection/abscess. No significant free fluid within the pelvis.    MUSCULOSKELETAL: Fat-containing bilateral inguinal hernias. Minimal degenerative osteoarthrosis both hips and throughout the spine.      Impression    IMPRESSION:   1.  Diffuse scrotal skin thickening and edema, greater on the left. Edema extends posteriorly from the scrotum into the medial aspect of the left thigh and gluteal region without evidence for peripherally enhancing drainable abscess. Additionally, there   is edema in the subcutaneous fat plane surrounding the left inguinal canal up into the left groin region. No evidence for gas within the soft tissues.    2.  Enlarged left inguinal and left external iliac chain lymph nodes, reactive.    3.  Chronic atrophy left kidney with chronic obstruction of the left urinary collecting system as detailed above.    4.  Hepatic steatosis.       Medications   piperacillin-tazobactam (ZOSYN) 4.5 g vial to attach to  mL bag (4.5 g Intravenous $New Bag 1/26/24 0000)   clindamycin (CLEOCIN) 900 mg in 50 mL D5W intermittent infusion (has no administration in time range)   vancomycin (VANCOCIN) 1,500 mg in sodium chloride 0.9 % 250 mL intermittent infusion (has no administration in time range)     Followed by   vancomycin (VANCOCIN) 500 mg vial to attach to  mL bag (has no administration in time range)   CT Saline Flush 100mL (60 mLs As instructed $Given 1/25/24 3937)   iopamidol (ISOVUE-370) solution 500 mL (98 mLs Intravenous $Given 1/25/24 2216)   sodium chloride 0.9% BOLUS 1,000 mL (0 mLs Intravenous Stopped 1/25/24 7041)       Assessments & Plan (with Medical Decision  Making)     Perirectal cellulitis  Cellulitis of scrotum  Left ureteral stone  Hydronephrosis with urinary obstruction due to ureteral calculus       64 year old male with history of previous abscesses requiring I&D and MRSA infection complicated by sepsis presents with pain and swelling in left perirectal space onset 3 days ago and spreading. Vitals are reassuring and stable. Physical exam showed indurated left perirectal space with erythema spreading into inguinal fold and partially into left scrotum with tenderness. Labs showed WBC of 21.7 with increased neutrophils, elevated creatinine of 2.17, CRP inflammation of 177, and normal lactic acid.   CT abdomen and pelvis was ordered with concern of perirectal abscess and potential for dean's gangrene - this showed diffuse edema from scrotum to medial aspect of left thigh and gluteal region without evidence for drainable abscess or gas within the soft tissues. Unexpectedly, CT also showed left-sided hydronephrosis with distal left ureteral conglomeration of calculi extending of length of 1.6 cm. Patient is not having flank pain or abdominal pain.    Discussed with patient the CT results including that there is no drainable abscess at this time and no evidence for gas within the soft tissues which is reassuring. He remains hemodynamically stable which is also reassuring. Given his labs and exam, his presentation is consistent with perirectal and scrotal cellulitis. Discussed he will need admission for both management of his cellulitis and left-sided hydronephrosis with calculi.   Consulted Dr. Guadarrama of general surgery who recommended starting broad spectrum antibiotic coverage with clindamycin, vancomycin, and Zosyn and pursue inpatient admission with surgery and urology both available. Blood cultures collected and pending.  No indication to acutely bring him to the OR this evening.  Unfortunately, we do not have urology presents in our hospital.  Also, we do  not have any inpatient beds.  Therefore, we are reaching out to other facilities at this time.   Urinalysis is pending at this time.  At shift change, Dr. Andres.  Has kindly agreed to assume his care.     I have reviewed the nursing notes.    I have reviewed the findings, diagnosis, plan and need for follow up with the patient.    New Prescriptions    No medications on file       Final diagnoses:   Perirectal cellulitis   Cellulitis of scrotum   Left ureteral stone   Hydronephrosis with urinary obstruction due to ureteral calculus       1/25/2024   [facility] EMERGENCY DEPT    Donna Locke, MS3    I was present with the medical student who participated in the service and in the documentation of the note. I have verified the history and personally performed the physical exam and medical decision making. I reviewed the note in detail and edited it appropriately. I agree with the assessment and plan of care as documented in the note.     Juice Silva PA-C, PA-C  01/26/24 0016

## 2024-01-26 NOTE — ED PROVIDER NOTES
Signout received at shift change from Dr. Joaquin Andres.  See his note for patient presenting details and initial workup.  Briefly, this is a 64-year-old male presenting for evaluation of buttock and groin abscess.  History of multiple abscesses in the past.  Area of concern starts in the perirectal and anal region and goes along his inguinal canal and extends to the left scrotum.  CT imaging did not reveal a drainable abscess or fluid collection, but rather demonstrated cellulitis throughout these regions.  Has been given IV antibiotics.  His disposition is pending, unfortunately there are no beds available locally, and surgery consult with Dr. Guadarrama last night had recommended that he go to a facility where urology would be available in case this needs debridement, especially if it were Dipesh's gangrene.  Do not see evidence of gangrene currently, patient has been otherwise stable.  Currently treating as a cellulitis but will need admission.    Patient had been waiting throughout the day and receiving IV antibiotic treatment.  Bed became available locally, and after brief discussion with current on-call surgeon Dr. Munson, he said that it would be preferable for patient to go to a facility with higher level of care and have access to urology.  Unsure of urology availability at this facility over the weekend.    Bed became available at [facility].  Spoke with hospitalist, Dr. Sahu, who accepted the patient in transfer.  He will need surgery consult and urology consults, and will continue IV antibiotics.       Michelle Dobbins, DO  01/28/24 7193

## 2024-01-26 NOTE — PHARMACY-VANCOMYCIN DOSING SERVICE
"Pharmacy Vancomycin Initial Note  Date of Service 2024  Patient's  1960  64 year old, male    Indication: Skin and Soft Tissue Infection    Current estimated CrCl = Estimated Creatinine Clearance: 40.7 mL/min (A) (based on SCr of 2.17 mg/dL (H)).    Creatinine for last 3 days  2024:  9:57 PM Creatinine 2.17 mg/dL    Recent Vancomycin Level(s) for last 3 days  No results found for requested labs within last 3 days.      Vancomycin IV Administrations (past 72 hours)        No vancomycin orders with administrations in past 72 hours.                    Nephrotoxins and other renal medications (From now, onward)      Start     Dose/Rate Route Frequency Ordered Stop    24 1120  vancomycin (VANCOCIN) 500 mg vial to attach to  mL bag        See Hyperspace for full Linked Orders Report.    500 mg  over 1 Hours Intravenous EVERY 12 HOURS 24 2316      24 2315  vancomycin (VANCOCIN) 1,500 mg in sodium chloride 0.9 % 250 mL intermittent infusion        See Hyperspace for full Linked Orders Report.    1,500 mg  over 90 Minutes Intravenous ONCE 24 2316      24 2310  piperacillin-tazobactam (ZOSYN) 4.5 g vial to attach to  mL bag        Note to Pharmacy: For [facility], [facility] and [facility]: For Zosyn-naive patients, use the \"Zosyn initial dose + extended infusion\" order panel.    4.5 g  over 30 Minutes Intravenous ONCE 24 2309              Contrast Orders - past 72 hours (72h ago, onward)      Start     Dose/Rate Route Frequency Stop    24 2210  iopamidol (ISOVUE-370) solution 500 mL         500 mL Intravenous ONCE 24 2216            [employer] Prediction of Planned Initial Vancomycin Regimen  Loading dose: 1500 mg at 00:00 2024.  Regimen: 500 mg IV every 12 hours.  Start time: 12:00 on 2024  Exposure target: AUC24 (range)400-600 mg/L.hr   AUC24,ss: 468 mg/L.hr  Probability of AUC24 > 400: 67 %  Ctrough,ss: 17.1 mg/L  Probability of Ctrough,ss > 20: 34 " %  Probability of nephrotoxicity (Lodise JAZZ 2009): 13 %          Plan:  Start vancomycin  loading dose of 1500 mg IV once followed in 12 hours by 500 mg IV q12h.   Vancomycin monitoring method: AUC  Vancomycin therapeutic monitoring goal: 400-600 mg*h/L  Pharmacy will check vancomycin levels as appropriate in 1-3 Days.    Serum creatinine levels will be ordered daily for the first week of therapy and at least twice weekly for subsequent weeks.      Gulshan Carty RPH

## 2024-01-26 NOTE — MEDICATION SCRIBE - ADMISSION MEDICATION HISTORY
Medication Scribe Admission Medication History    Admission medication history is complete. The information provided in this note is only as accurate as the sources available at the time of the update.    Information Source(s): Patient and CareEverywhere/SureScripts via in-person    Pertinent Information: n/a    Changes made to PTA medication list:  Added: ibuprofen  Deleted: unknown abx (probably bactrim DS, last taken over a month ago and it's gone)  Changed: None    Medication Affordability:       Allergies reviewed with patient and updates made in EHR: yes    Medication History Completed By: ANURAG HOPKINS 1/26/2024 3:36 PM    PTA Med List   Medication Sig Last Dose    ibuprofen (ADVIL/MOTRIN) 200 MG tablet Take 400 mg by mouth daily as needed for pain 1/25/2024 at 1800    meclizine 25 MG CHEW Take 25 mg by mouth every 6 hours as needed for dizziness More than a month at [facility]

## 2024-01-27 ENCOUNTER — ANESTHESIA (OUTPATIENT)
Dept: SURGERY | Facility: CLINIC | Age: 64
DRG: 580 | End: 2024-01-27

## 2024-01-27 ENCOUNTER — APPOINTMENT (OUTPATIENT)
Dept: GENERAL RADIOLOGY | Facility: CLINIC | Age: 64
DRG: 580 | End: 2024-01-27
Attending: HOSPITALIST

## 2024-01-27 ENCOUNTER — ANESTHESIA EVENT (OUTPATIENT)
Dept: SURGERY | Facility: CLINIC | Age: 64
DRG: 580 | End: 2024-01-27

## 2024-01-27 LAB
ANION GAP SERPL CALCULATED.3IONS-SCNC: 10 MMOL/L (ref 7–15)
BUN SERPL-MCNC: 16.5 MG/DL (ref 8–23)
CALCIUM SERPL-MCNC: 8.6 MG/DL (ref 8.8–10.2)
CHLORIDE SERPL-SCNC: 106 MMOL/L (ref 98–107)
CREAT SERPL-MCNC: 1.48 MG/DL (ref 0.67–1.17)
DEPRECATED HCO3 PLAS-SCNC: 22 MMOL/L (ref 22–29)
EGFRCR SERPLBLD CKD-EPI 2021: 53 ML/MIN/1.73M2
ERYTHROCYTE [DISTWIDTH] IN BLOOD BY AUTOMATED COUNT: 12.5 % (ref 10–15)
GLUCOSE SERPL-MCNC: 103 MG/DL (ref 70–99)
GRAM STAIN RESULT: ABNORMAL
GRAM STAIN RESULT: ABNORMAL
GRAM STAIN RESULT: NORMAL
GRAM STAIN RESULT: NORMAL
HCT VFR BLD AUTO: 40.9 % (ref 40–53)
HGB BLD-MCNC: 13.7 G/DL (ref 13.3–17.7)
MCH RBC QN AUTO: 30.9 PG (ref 26.5–33)
MCHC RBC AUTO-ENTMCNC: 33.5 G/DL (ref 31.5–36.5)
MCV RBC AUTO: 92 FL (ref 78–100)
PLATELET # BLD AUTO: 203 10E3/UL (ref 150–450)
POTASSIUM SERPL-SCNC: 4.4 MMOL/L (ref 3.4–5.3)
RBC # BLD AUTO: 4.44 10E6/UL (ref 4.4–5.9)
SODIUM SERPL-SCNC: 138 MMOL/L (ref 135–145)
WBC # BLD AUTO: 9.5 10E3/UL (ref 4–11)

## 2024-01-27 PROCEDURE — C1769 GUIDE WIRE: HCPCS | Performed by: UROLOGY

## 2024-01-27 PROCEDURE — 80048 BASIC METABOLIC PNL TOTAL CA: CPT | Performed by: UROLOGY

## 2024-01-27 PROCEDURE — 99233 SBSQ HOSP IP/OBS HIGH 50: CPT | Performed by: STUDENT IN AN ORGANIZED HEALTH CARE EDUCATION/TRAINING PROGRAM

## 2024-01-27 PROCEDURE — 258N000003 HC RX IP 258 OP 636: Performed by: NURSE ANESTHETIST, CERTIFIED REGISTERED

## 2024-01-27 PROCEDURE — 255N000002 HC RX 255 OP 636: Performed by: UROLOGY

## 2024-01-27 PROCEDURE — 360N000083 HC SURGERY LEVEL 3 W/ FLUORO, PER MIN: Performed by: UROLOGY

## 2024-01-27 PROCEDURE — 85027 COMPLETE CBC AUTOMATED: CPT | Performed by: UROLOGY

## 2024-01-27 PROCEDURE — 710N000010 HC RECOVERY PHASE 1, LEVEL 2, PER MIN: Performed by: UROLOGY

## 2024-01-27 PROCEDURE — 36415 COLL VENOUS BLD VENIPUNCTURE: CPT | Performed by: UROLOGY

## 2024-01-27 PROCEDURE — 250N000011 HC RX IP 250 OP 636: Performed by: INTERNAL MEDICINE

## 2024-01-27 PROCEDURE — 272N000001 HC OR GENERAL SUPPLY STERILE: Performed by: UROLOGY

## 2024-01-27 PROCEDURE — 250N000009 HC RX 250: Performed by: NURSE ANESTHETIST, CERTIFIED REGISTERED

## 2024-01-27 PROCEDURE — 82365 CALCULUS SPECTROSCOPY: CPT | Performed by: UROLOGY

## 2024-01-27 PROCEDURE — 250N000025 HC SEVOFLURANE, PER MIN: Performed by: UROLOGY

## 2024-01-27 PROCEDURE — 74420 UROGRAPHY RTRGR +-KUB: CPT | Mod: 26 | Performed by: UROLOGY

## 2024-01-27 PROCEDURE — 52356 CYSTO/URETERO W/LITHOTRIPSY: CPT | Mod: 22 | Performed by: UROLOGY

## 2024-01-27 PROCEDURE — C1758 CATHETER, URETERAL: HCPCS | Performed by: UROLOGY

## 2024-01-27 PROCEDURE — 250N000011 HC RX IP 250 OP 636: Performed by: NURSE ANESTHETIST, CERTIFIED REGISTERED

## 2024-01-27 PROCEDURE — 250N000013 HC RX MED GY IP 250 OP 250 PS 637: Performed by: UROLOGY

## 2024-01-27 PROCEDURE — 87075 CULTR BACTERIA EXCEPT BLOOD: CPT | Performed by: UROLOGY

## 2024-01-27 PROCEDURE — 0Y960ZZ DRAINAGE OF LEFT INGUINAL REGION, OPEN APPROACH: ICD-10-PCS | Performed by: UROLOGY

## 2024-01-27 PROCEDURE — 87070 CULTURE OTHR SPECIMN AEROBIC: CPT | Performed by: UROLOGY

## 2024-01-27 PROCEDURE — 999N000179 XR SURGERY CARM FLUORO LESS THAN 5 MIN W STILLS: Mod: TC

## 2024-01-27 PROCEDURE — 120N000001 HC R&B MED SURG/OB

## 2024-01-27 PROCEDURE — 250N000009 HC RX 250: Performed by: UROLOGY

## 2024-01-27 PROCEDURE — 250N000011 HC RX IP 250 OP 636: Mod: JZ | Performed by: UROLOGY

## 2024-01-27 PROCEDURE — 250N000011 HC RX IP 250 OP 636: Performed by: UROLOGY

## 2024-01-27 PROCEDURE — C2617 STENT, NON-COR, TEM W/O DEL: HCPCS | Performed by: UROLOGY

## 2024-01-27 PROCEDURE — 87205 SMEAR GRAM STAIN: CPT | Performed by: UROLOGY

## 2024-01-27 PROCEDURE — 999N000141 HC STATISTIC PRE-PROCEDURE NURSING ASSESSMENT: Performed by: UROLOGY

## 2024-01-27 PROCEDURE — 0TC78ZZ EXTIRPATION OF MATTER FROM LEFT URETER, VIA NATURAL OR ARTIFICIAL OPENING ENDOSCOPIC: ICD-10-PCS | Performed by: UROLOGY

## 2024-01-27 PROCEDURE — 258N000003 HC RX IP 258 OP 636: Performed by: UROLOGY

## 2024-01-27 PROCEDURE — 258N000001 HC RX 258: Performed by: UROLOGY

## 2024-01-27 PROCEDURE — 0T778DZ DILATION OF LEFT URETER WITH INTRALUMINAL DEVICE, VIA NATURAL OR ARTIFICIAL OPENING ENDOSCOPIC: ICD-10-PCS | Performed by: UROLOGY

## 2024-01-27 PROCEDURE — 370N000017 HC ANESTHESIA TECHNICAL FEE, PER MIN: Performed by: UROLOGY

## 2024-01-27 DEVICE — STENT URETERAL POLARIS ULTRA 7FRX26CM M0061921430: Type: IMPLANTABLE DEVICE | Site: URETER | Status: FUNCTIONAL

## 2024-01-27 RX ORDER — FENTANYL CITRATE 50 UG/ML
25 INJECTION, SOLUTION INTRAMUSCULAR; INTRAVENOUS EVERY 5 MIN PRN
Status: DISCONTINUED | OUTPATIENT
Start: 2024-01-27 | End: 2024-01-27 | Stop reason: HOSPADM

## 2024-01-27 RX ORDER — ONDANSETRON 2 MG/ML
INJECTION INTRAMUSCULAR; INTRAVENOUS PRN
Status: DISCONTINUED | OUTPATIENT
Start: 2024-01-27 | End: 2024-01-27

## 2024-01-27 RX ORDER — DEXAMETHASONE SODIUM PHOSPHATE 4 MG/ML
INJECTION, SOLUTION INTRA-ARTICULAR; INTRALESIONAL; INTRAMUSCULAR; INTRAVENOUS; SOFT TISSUE PRN
Status: DISCONTINUED | OUTPATIENT
Start: 2024-01-27 | End: 2024-01-27

## 2024-01-27 RX ORDER — ACETAMINOPHEN 325 MG/1
975 TABLET ORAL ONCE
Status: DISCONTINUED | OUTPATIENT
Start: 2024-01-27 | End: 2024-01-27 | Stop reason: HOSPADM

## 2024-01-27 RX ORDER — CEFAZOLIN SODIUM/WATER 2 G/20 ML
2 SYRINGE (ML) INTRAVENOUS
Status: DISCONTINUED | OUTPATIENT
Start: 2024-01-27 | End: 2024-01-28

## 2024-01-27 RX ORDER — HYDRALAZINE HYDROCHLORIDE 20 MG/ML
2.5-5 INJECTION INTRAMUSCULAR; INTRAVENOUS EVERY 10 MIN PRN
Status: DISCONTINUED | OUTPATIENT
Start: 2024-01-27 | End: 2024-01-27 | Stop reason: HOSPADM

## 2024-01-27 RX ORDER — ONDANSETRON 2 MG/ML
4 INJECTION INTRAMUSCULAR; INTRAVENOUS EVERY 30 MIN PRN
Status: DISCONTINUED | OUTPATIENT
Start: 2024-01-27 | End: 2024-01-27 | Stop reason: HOSPADM

## 2024-01-27 RX ORDER — KETOROLAC TROMETHAMINE 30 MG/ML
INJECTION, SOLUTION INTRAMUSCULAR; INTRAVENOUS PRN
Status: DISCONTINUED | OUTPATIENT
Start: 2024-01-27 | End: 2024-01-27

## 2024-01-27 RX ORDER — CEFAZOLIN SODIUM/WATER 2 G/20 ML
2 SYRINGE (ML) INTRAVENOUS SEE ADMIN INSTRUCTIONS
Status: DISCONTINUED | OUTPATIENT
Start: 2024-01-27 | End: 2024-01-28

## 2024-01-27 RX ORDER — GLYCOPYRROLATE 0.2 MG/ML
INJECTION, SOLUTION INTRAMUSCULAR; INTRAVENOUS PRN
Status: DISCONTINUED | OUTPATIENT
Start: 2024-01-27 | End: 2024-01-27

## 2024-01-27 RX ORDER — KETOROLAC TROMETHAMINE 15 MG/ML
15 INJECTION, SOLUTION INTRAMUSCULAR; INTRAVENOUS
Status: DISCONTINUED | OUTPATIENT
Start: 2024-01-27 | End: 2024-01-27 | Stop reason: HOSPADM

## 2024-01-27 RX ORDER — FENTANYL CITRATE 50 UG/ML
50 INJECTION, SOLUTION INTRAMUSCULAR; INTRAVENOUS EVERY 5 MIN PRN
Status: DISCONTINUED | OUTPATIENT
Start: 2024-01-27 | End: 2024-01-27 | Stop reason: HOSPADM

## 2024-01-27 RX ORDER — PROPOFOL 10 MG/ML
INJECTION, EMULSION INTRAVENOUS PRN
Status: DISCONTINUED | OUTPATIENT
Start: 2024-01-27 | End: 2024-01-27

## 2024-01-27 RX ORDER — FENTANYL CITRATE 50 UG/ML
INJECTION, SOLUTION INTRAMUSCULAR; INTRAVENOUS PRN
Status: DISCONTINUED | OUTPATIENT
Start: 2024-01-27 | End: 2024-01-27

## 2024-01-27 RX ORDER — SODIUM CHLORIDE, SODIUM LACTATE, POTASSIUM CHLORIDE, CALCIUM CHLORIDE 600; 310; 30; 20 MG/100ML; MG/100ML; MG/100ML; MG/100ML
INJECTION, SOLUTION INTRAVENOUS CONTINUOUS
Status: DISCONTINUED | OUTPATIENT
Start: 2024-01-27 | End: 2024-01-27 | Stop reason: HOSPADM

## 2024-01-27 RX ORDER — SODIUM CHLORIDE, SODIUM LACTATE, POTASSIUM CHLORIDE, CALCIUM CHLORIDE 600; 310; 30; 20 MG/100ML; MG/100ML; MG/100ML; MG/100ML
INJECTION, SOLUTION INTRAVENOUS CONTINUOUS PRN
Status: DISCONTINUED | OUTPATIENT
Start: 2024-01-27 | End: 2024-01-27

## 2024-01-27 RX ORDER — LIDOCAINE HYDROCHLORIDE 20 MG/ML
INJECTION, SOLUTION INFILTRATION; PERINEURAL PRN
Status: DISCONTINUED | OUTPATIENT
Start: 2024-01-27 | End: 2024-01-27

## 2024-01-27 RX ORDER — BUPIVACAINE HYDROCHLORIDE 2.5 MG/ML
INJECTION, SOLUTION EPIDURAL; INFILTRATION; INTRACAUDAL PRN
Status: DISCONTINUED | OUTPATIENT
Start: 2024-01-27 | End: 2024-01-27 | Stop reason: HOSPADM

## 2024-01-27 RX ORDER — DEXAMETHASONE SODIUM PHOSPHATE 4 MG/ML
4 INJECTION, SOLUTION INTRA-ARTICULAR; INTRALESIONAL; INTRAMUSCULAR; INTRAVENOUS; SOFT TISSUE
Status: DISCONTINUED | OUTPATIENT
Start: 2024-01-27 | End: 2024-01-27 | Stop reason: HOSPADM

## 2024-01-27 RX ORDER — CEFAZOLIN SODIUM/WATER 2 G/20 ML
2 SYRINGE (ML) INTRAVENOUS
Status: COMPLETED | OUTPATIENT
Start: 2024-01-27 | End: 2024-01-27

## 2024-01-27 RX ORDER — ONDANSETRON 4 MG/1
4 TABLET, ORALLY DISINTEGRATING ORAL EVERY 30 MIN PRN
Status: DISCONTINUED | OUTPATIENT
Start: 2024-01-27 | End: 2024-01-27 | Stop reason: HOSPADM

## 2024-01-27 RX ORDER — METOPROLOL TARTRATE 1 MG/ML
1-2 INJECTION, SOLUTION INTRAVENOUS EVERY 5 MIN PRN
Status: DISCONTINUED | OUTPATIENT
Start: 2024-01-27 | End: 2024-01-27 | Stop reason: HOSPADM

## 2024-01-27 RX ADMIN — GLYCOPYRROLATE 0.2 MG: 0.2 INJECTION, SOLUTION INTRAMUSCULAR; INTRAVENOUS at 08:18

## 2024-01-27 RX ADMIN — ACETAMINOPHEN 650 MG: 325 TABLET, FILM COATED ORAL at 18:31

## 2024-01-27 RX ADMIN — Medication 2 G: at 08:16

## 2024-01-27 RX ADMIN — PROPOFOL 150 MG: 10 INJECTION, EMULSION INTRAVENOUS at 08:18

## 2024-01-27 RX ADMIN — VANCOMYCIN HYDROCHLORIDE 1250 MG: 5 INJECTION, POWDER, LYOPHILIZED, FOR SOLUTION INTRAVENOUS at 21:54

## 2024-01-27 RX ADMIN — ACETAMINOPHEN 650 MG: 325 TABLET, FILM COATED ORAL at 22:48

## 2024-01-27 RX ADMIN — KETOROLAC TROMETHAMINE 30 MG: 30 INJECTION, SOLUTION INTRAMUSCULAR at 09:28

## 2024-01-27 RX ADMIN — ROCURONIUM BROMIDE 10 MG: 50 INJECTION, SOLUTION INTRAVENOUS at 08:42

## 2024-01-27 RX ADMIN — FENTANYL CITRATE 50 MCG: 50 INJECTION INTRAMUSCULAR; INTRAVENOUS at 09:25

## 2024-01-27 RX ADMIN — FENTANYL CITRATE 100 MCG: 50 INJECTION INTRAMUSCULAR; INTRAVENOUS at 08:18

## 2024-01-27 RX ADMIN — DEXAMETHASONE SODIUM PHOSPHATE 4 MG: 4 INJECTION, SOLUTION INTRA-ARTICULAR; INTRALESIONAL; INTRAMUSCULAR; INTRAVENOUS; SOFT TISSUE at 08:18

## 2024-01-27 RX ADMIN — MIDAZOLAM 1 MG: 1 INJECTION INTRAMUSCULAR; INTRAVENOUS at 08:09

## 2024-01-27 RX ADMIN — PIPERACILLIN AND TAZOBACTAM 3.38 G: 3; .375 INJECTION, POWDER, FOR SOLUTION INTRAVENOUS at 11:17

## 2024-01-27 RX ADMIN — SODIUM CHLORIDE, POTASSIUM CHLORIDE, SODIUM LACTATE AND CALCIUM CHLORIDE: 600; 310; 30; 20 INJECTION, SOLUTION INTRAVENOUS at 13:15

## 2024-01-27 RX ADMIN — CLINDAMYCIN PHOSPHATE 900 MG: 900 INJECTION, SOLUTION INTRAVENOUS at 05:03

## 2024-01-27 RX ADMIN — OXYCODONE HYDROCHLORIDE 5 MG: 5 TABLET ORAL at 22:48

## 2024-01-27 RX ADMIN — PROPOFOL 40 MG: 10 INJECTION, EMULSION INTRAVENOUS at 08:42

## 2024-01-27 RX ADMIN — SUGAMMADEX 200 MG: 100 INJECTION, SOLUTION INTRAVENOUS at 09:26

## 2024-01-27 RX ADMIN — CLINDAMYCIN PHOSPHATE 900 MG: 900 INJECTION, SOLUTION INTRAVENOUS at 13:15

## 2024-01-27 RX ADMIN — ONDANSETRON 4 MG: 2 INJECTION INTRAMUSCULAR; INTRAVENOUS at 08:20

## 2024-01-27 RX ADMIN — SODIUM CHLORIDE, POTASSIUM CHLORIDE, SODIUM LACTATE AND CALCIUM CHLORIDE: 600; 310; 30; 20 INJECTION, SOLUTION INTRAVENOUS at 07:36

## 2024-01-27 RX ADMIN — PIPERACILLIN AND TAZOBACTAM 3.38 G: 3; .375 INJECTION, POWDER, FOR SOLUTION INTRAVENOUS at 17:40

## 2024-01-27 RX ADMIN — PROPOFOL 30 MG: 10 INJECTION, EMULSION INTRAVENOUS at 09:25

## 2024-01-27 RX ADMIN — CLINDAMYCIN PHOSPHATE 900 MG: 900 INJECTION, SOLUTION INTRAVENOUS at 20:55

## 2024-01-27 RX ADMIN — TAMSULOSIN HYDROCHLORIDE 0.4 MG: 0.4 CAPSULE ORAL at 11:50

## 2024-01-27 RX ADMIN — ROCURONIUM BROMIDE 30 MG: 50 INJECTION, SOLUTION INTRAVENOUS at 08:18

## 2024-01-27 RX ADMIN — LIDOCAINE HYDROCHLORIDE 50 MG: 20 INJECTION, SOLUTION INFILTRATION; PERINEURAL at 08:18

## 2024-01-27 RX ADMIN — PIPERACILLIN AND TAZOBACTAM 3.38 G: 3; .375 INJECTION, POWDER, FOR SOLUTION INTRAVENOUS at 03:27

## 2024-01-27 ASSESSMENT — ACTIVITIES OF DAILY LIVING (ADL)
ADLS_ACUITY_SCORE: 20

## 2024-01-27 NOTE — PLAN OF CARE
Goal Outcome Evaluation:      Plan of Care Reviewed With: patient    Overall Patient Progress: no changeOverall Patient Progress: no change         Pt up ind/sba. VSS on room air. NPO. LR @125ml/hr. IV clindamycin, vanco and zosyn given. Pain 6/10, PRN meds given, see MAR. Plan for surgery today, will continue with plan of care.

## 2024-01-27 NOTE — CONSULTS
[facility] Consultation by [facility]    [unfilled] MRN# [id number]   Age: 64 year old YOB: 1960     Date of Admission:  1/26/2024    Reason for consult: Left ureteral stone, scrotal and perineal cellulitis       Requesting PA/MD: Dr Batista       Level of consult: Consult, follow and place orders           Assessment and Plan:   Assessment:   Left ureteral stone  Cellulitis of the left perineum with possible fluid collection      Plan:   Plan to proceed to the operating room tomorrow morning.  The left ureteral stone is too large to pass and will require removal.  For the left ureteral stone he will require cystoscopy with left-sided ureteroscopy, laser lithotripsy of stone basketing, left-sided ureteral stent placement.  We discussed this procedure in detail today along with its risks and expected recovery.  All of his questions were answered and he wishes to proceed.  He understands the risk that he may require multiple procedures to remove the stone.    We also discussed the cellulitis of his scrotum and perineum.  The cellulitis is most pronounced on the left side of the perineum.  There are no fluid collections on CT scan.  I do not palpate any fluid collections on exam but he is quite tender in this area.  This may improve with antibiotics alone.  However, as we are planning to go to the operating room tomorrow for the above stone procedure I recommended that I opened up the skin over the area of the most pronounced cellulitis to gurpreet and drain any fluid that might be underneath.  He is in agreement with this plan as the pain in the perineum is his primary complaint.  We will perform both procedures under the same anesthetic tomorrow morning.    Buck Allen M.D.  [facility]  [phone]                 Chief Complaint:        History is obtained from the patient and EMR.         History of Present Illness:   This patient is a 64 year old male who lives in [place] and  presented to [facility] yesterday evening.  He has a history of MRSA and boils and abscesses of the skin in the groin and perineum that have required drainage in the past.  This was his complaint when he went into the hospital at [facility] last night.  He had a CT scan performed that showed no fluid collections present or evidence of Dipesh's gangrene.  The CT scan also incidentally discovered a large distal left ureteral stone that is asymptomatic.  It appears that general surgery was contacted at [facility] and they had recommended that the patient be transferred to another hospital because of the kidney stone.    The patient is asymptomatic from the kidney stone.  He says he thinks he has passed stones previously but has never required any procedures.    I personally reviewed his CT scan images.  Atrophic parenchyma of the left kidney.  Hydronephrosis down to the distal ureter where there is a large stone in the distal ureter.  There is soft tissue swelling of the left hemiscrotum, the left groin, and left perineum.    On examination he is well-appearing and in no acute distress.  His scrotal examination is fairly normal.  He perhaps has a small amount of erythema in the groin crease.  He has more erythema and a thickened cellulitis below in the perineum off to the left side.  He is tender in this area.  No evidence of drainage or fluid in this area at this time.              Past Medical History:   No past medical history on file.          Past Surgical History:   No past surgical history on file.          Social History:     Social History     Tobacco Use    Smoking status: Not on file    Smokeless tobacco: Not on file   Substance Use Topics    Alcohol use: Not on file             Family History:   No family history on file.  Family history reviewed.             Allergies:   No Known Allergies          Medications:     Current Facility-Administered Medications   Medication    acetaminophen  (TYLENOL) tablet 650 mg    Or    acetaminophen (TYLENOL) Suppository 650 mg    calcium carbonate (TUMS) chewable tablet 1,000 mg    clindamycin (CLEOCIN) 900 mg in 50 mL D5W intermittent infusion    HYDROmorphone (DILAUDID) injection 0.2 mg    HYDROmorphone (DILAUDID) injection 0.4 mg    lactated ringers infusion    lidocaine (LMX4) cream    lidocaine 1 % 0.1-1 mL    naloxone (NARCAN) injection 0.2 mg    Or    naloxone (NARCAN) injection 0.4 mg    Or    naloxone (NARCAN) injection 0.2 mg    Or    naloxone (NARCAN) injection 0.4 mg    ondansetron (ZOFRAN ODT) ODT tab 4 mg    Or    ondansetron (ZOFRAN) injection 4 mg    oxyCODONE (ROXICODONE) tablet 5 mg    oxyCODONE IR (ROXICODONE) half-tab 2.5 mg    piperacillin-tazobactam (ZOSYN) 3.375 g vial to attach to  mL bag    polyethylene glycol (MIRALAX) Packet 17 g    senna-docusate (SENOKOT-S/PERICOLACE) 8.6-50 MG per tablet 1 tablet    Or    senna-docusate (SENOKOT-S/PERICOLACE) 8.6-50 MG per tablet 2 tablet    sodium chloride (PF) 0.9% PF flush 3 mL    sodium chloride (PF) 0.9% PF flush 3 mL    tamsulosin (FLOMAX) capsule 0.4 mg             Review of Systems:   A comprehensive 10-point review of systems was performed and found to be negative except as described in the HPI.     /71 (BP Location: Right arm)   Pulse 87   Temp 97.8  F (36.6  C) (Oral)   Resp 20   SpO2 98%   PSYCH: NAD  EYES: EOMI  MOUTH: MMM  NEURO: AAO x3            Data:     Lab Results   Component Value Date    WBC 13.5 (H) 01/26/2024    HGB 13.8 01/26/2024    HCT 41.1 01/26/2024    MCV 92 01/26/2024     01/26/2024     Lab Results   Component Value Date    CR 1.62 (H) 01/26/2024

## 2024-01-27 NOTE — ANESTHESIA PREPROCEDURE EVALUATION
Anesthesia Pre-Procedure Evaluation    Patient: Tee Escudero   MRN: [id number] : 1960        Procedure : Procedure(s):  Cystoscopy, left ureteroscopy with laser lithotripsy and stone basketing.  Left ureteral stent placement. Incision and drainage of scrotal abscess          No past medical history on file.   No past surgical history on file.   No Known Allergies   Social History     Tobacco Use    Smoking status: Not on file    Smokeless tobacco: Not on file   Substance Use Topics    Alcohol use: Not on file      Wt Readings from Last 1 Encounters:   24 91.6 kg (202 lb)        Anesthesia Evaluation   Pt has had prior anesthetic. Type: General.        ROS/MED HX  ENT/Pulmonary:  - neg pulmonary ROS     Neurologic:  - neg neurologic ROS     Cardiovascular:  - neg cardiovascular ROS     METS/Exercise Tolerance:     Hematologic: Comments: Lab Test        24          2157          WBC          13.5*        21.7*         HGB          13.8         15.8          MCV          92           90            PLT          212          231           INR          1.10          --            Lab Test        24          0825          2157          NA           136           --          136           POTASSIUM    4.2           --          4.1           CHLORIDE     103           --          100           CO2          23            --          20*           BUN          22.7          --          27.0*         CR           1.62*        1.87*        2.17*         ANIONGAP     10            --          16*           TEMITOPE          8.7*          --          9.4           GLC          87            --          91                  Musculoskeletal:  - neg musculoskeletal ROS     GI/Hepatic:  - neg GI/hepatic ROS     Renal/Genitourinary: Comment: scrotal abscess    (+) renal disease, type: ARF,     Nephrolithiasis ,      "  Endo:       Psychiatric/Substance Use:  - neg psychiatric ROS     Infectious Disease:     (+)   MRSA,         Malignancy:  - neg malignancy ROS     Other:  - neg other ROS          Physical Exam    Airway        Mallampati: II   TM distance: > 3 FB   Neck ROM: full   Mouth opening: > 3 cm    Respiratory Devices and Support         Dental       (+) Minor Abnormalities - some fillings, tiny chips      Cardiovascular   cardiovascular exam normal          Pulmonary   pulmonary exam normal                OUTSIDE LABS:  CBC:   Lab Results   Component Value Date    WBC 13.5 (H) 01/26/2024    WBC 21.7 (H) 01/25/2024    HGB 13.8 01/26/2024    HGB 15.8 01/25/2024    HCT 41.1 01/26/2024    HCT 47.2 01/25/2024     01/26/2024     01/25/2024     BMP:   Lab Results   Component Value Date     01/26/2024     01/25/2024    POTASSIUM 4.2 01/26/2024    POTASSIUM 4.1 01/25/2024    CHLORIDE 103 01/26/2024    CHLORIDE 100 01/25/2024    CO2 23 01/26/2024    CO2 20 (L) 01/25/2024    BUN 22.7 01/26/2024    BUN 27.0 (H) 01/25/2024    CR 1.62 (H) 01/26/2024    CR 1.87 (H) 01/26/2024    GLC 87 01/26/2024    GLC 91 01/25/2024     COAGS:   Lab Results   Component Value Date    INR 1.10 01/26/2024     POC: No results found for: \"BGM\", \"HCG\", \"HCGS\"  HEPATIC:   Lab Results   Component Value Date    ALBUMIN 4.0 01/25/2024    PROTTOTAL 7.4 01/25/2024    ALT 24 01/25/2024    AST 20 01/25/2024    ALKPHOS 90 01/25/2024    BILITOTAL 0.9 01/25/2024     OTHER:   Lab Results   Component Value Date    LACT 1.0 01/25/2024    TEMITOPE 8.7 (L) 01/26/2024       Anesthesia Plan    ASA Status:  2       Anesthesia Type: General.     - Airway: ETT   Induction: Intravenous, Propofol.   Maintenance: Balanced.        Consents    Anesthesia Plan(s) and associated risks, benefits, and realistic alternatives discussed. Questions answered and patient/representative(s) expressed understanding.     - Discussed:     - Discussed with:  Patient      - " "Extended Intubation/Ventilatory Support Discussed: No.      - Patient is DNR/DNI Status: No     Use of blood products discussed: Yes.     - Discussed with: Patient.     - Consented: consented to blood products     Postoperative Care    Pain management: IV analgesics.   PONV prophylaxis: Dexamethasone or Solumedrol, Ondansetron (or other 5HT-3)     Comments:               Melchor Kauffman MD    I have reviewed the pertinent notes and labs in the chart from the past 30 days and (re)examined the patient.  Any updates or changes from those notes are reflected in this note.              # Overweight: Estimated body mass index is 28.17 kg/m  as calculated from the following:    Height as of this encounter: 1.803 m (5' 11\").    Weight as of this encounter: 91.6 kg (202 lb).      "

## 2024-01-27 NOTE — PHARMACY-VANCOMYCIN DOSING SERVICE
"Pharmacy Vancomycin Initial Note  Date of Service 2024  Patient's  1960  64 year old, male    Indication: Abscess, MRSA, and Skin and Soft Tissue Infection    Current estimated CrCl = Estimated Creatinine Clearance: 54.7 mL/min (A) (based on SCr of 1.62 mg/dL (H)).    Creatinine for last 3 days  2024:  9:57 PM Creatinine 2.17 mg/dL  2024:  8:25 AM Creatinine 1.87 mg/dL;  7:35 PM Creatinine 1.62 mg/dL    Recent Vancomycin Level(s) for last 3 days  No results found for requested labs within last 3 days.      Vancomycin IV Administrations (past 72 hours)                     vancomycin (VANCOCIN) 500 mg vial to attach to  mL bag (mg) 500 mg New Bag 24 1141    vancomycin (VANCOCIN) 1,500 mg in sodium chloride 0.9 % 250 mL intermittent infusion (mg) 1,500 mg New Bag 24 0105                    Nephrotoxins and other renal medications (From now, onward)      Start     Dose/Rate Route Frequency Ordered Stop    24 2200  vancomycin (VANCOCIN) 1,250 mg in 0.9% NaCl 250 mL intermittent infusion         1,250 mg  over 90 Minutes Intravenous EVERY 24 HOURS 24 2130  piperacillin-tazobactam (ZOSYN) 3.375 g vial to attach to  mL bag        Note to Pharmacy: For [facility], [facility] and [facility]: For Zosyn-naive patients, use the \"Zosyn initial dose + extended infusion\" order panel.    3.375 g  over 30 Minutes Intravenous EVERY 6 HOURS 24 1927              Contrast Orders - past 72 hours (72h ago, onward)      None            [employer] Prediction of Planned Initial Vancomycin Regimen  Loading dose: 1500mg IV given @ 0105  Regimen: 1250 mg IV every 24 hours.  Start time: 23:41 on 2024  Exposure target: AUC24 (range)400-600 mg/L.hr   AUC24,ss: 468 mg/L.hr  Probability of AUC24 > 400: 67 %  Ctrough,ss: 14.3 mg/L  Probability of Ctrough,ss > 20: 22 %  Probability of nephrotoxicity (Lodise JAZZ ): 9 %          Plan:  Start vancomycin  1250 mg IV q24h with " 1500mg IV loaded   Vancomycin monitoring method: AUC  Vancomycin therapeutic monitoring goal: 400-600 mg*h/L  Pharmacy will check vancomycin levels as appropriate in 1-3 Days.    Serum creatinine levels will be ordered a minimum of twice weekly.      Gulshan Collins RPH

## 2024-01-27 NOTE — CONSULTS
Surgery-Fidel  Consult received; he is being evaluated by Dr Allen for the same concern. Please let us know if we can be of assistance.

## 2024-01-27 NOTE — PLAN OF CARE
To Do:  End of Shift Summary  For vital signs and complete assessments, please see documentation flowsheets.     Pertinent assessments: Pt SBA.VSS on RA. Regular diet. Continues on Cleocin, Zosyn vanco given. Dressing changed. Sanguinous drainage from incision site. Vital in place.    Major Shift Events: Postop Cytoscopy L ureteroscopy w/ laser lithotripsy. L ureteral stent placement, I&D of perirectal abscess w/ packing placed.    Treatment Plan: Encourage fluids. Discharge, IV abxTBD.    Bedside Nurse: Carmen Holm RN

## 2024-01-27 NOTE — ANESTHESIA PROCEDURE NOTES
Airway       Patient location during procedure: OR       Procedure Start/Stop Times: 1/27/2024 8:17 AM  Staff -        CRNA: Tano Marcelino APRN CRNA       Performed By: CRNA  Consent for Airway        Urgency: elective  Indications and Patient Condition       Indications for airway management: alexsandra-procedural, airway protection and altered level of consciousness       Induction type:intravenous       Mask difficulty assessment: 1 - vent by mask    Final Airway Details       Final airway type: endotracheal airway       Successful airway: ETT - single  Endotracheal Airway Details        ETT size (mm): 8.0       Cuffed: yes       Successful intubation technique: direct laryngoscopy       DL Blade Type: MAC 1       Grade View of Cords: 1       Adjucts: stylet       Position: Right       Measured from: lips       Secured at (cm): 23       Bite block used: None    Post intubation assessment        Placement verified by: capnometry, equal breath sounds and chest rise        Number of attempts at approach: 1       Secured with: tape       Ease of procedure: easy       Dentition: Intact    Medication(s) Administered   Medication Administration Time: 1/27/2024 8:17 AM

## 2024-01-27 NOTE — ANESTHESIA CARE TRANSFER NOTE
Patient: Tee Escudero    Procedure: Procedure(s):  Cystoscopy, left ureteroscopy with laser lithotripsy and stone basketing.  Left ureteral stent placement. Incision and drainage of scrotal abscess       Diagnosis: Ureteral stone [N20.1]  Cellulitis of perineum [L03.315]  Diagnosis Additional Information: No value filed.    Anesthesia Type:   General     Note:    Oropharynx: spontaneously breathing  Level of Consciousness: awake  Oxygen Supplementation: face mask    Independent Airway: airway patency satisfactory and stable  Dentition: dentition unchanged  Vital Signs Stable: post-procedure vital signs reviewed and stable  Report to RN Given: handoff report given  Patient transferred to: PACU  Comments: To PACU, report to RN, oxygen per face mask.        Vitals:  Vitals Value Taken Time   BP     Temp 98  F (36.7  C) 01/27/24 0950   Pulse 79 01/27/24 0954   Resp 27 01/27/24 0954   SpO2 91 % 01/27/24 0954   Vitals shown include unfiled device data.    Electronically Signed By: MARIANN Dickerson CRNA  January 27, 2024  9:54 AM

## 2024-01-27 NOTE — ANESTHESIA POSTPROCEDURE EVALUATION
Patient: Tee Escudero    Procedure: Procedure(s):  Cystoscopy, left ureteroscopy with laser lithotripsy and stone basketing.  Left ureteral stent placement. Incision and drainage of scrotal abscess       Anesthesia Type:  General    Note:  Disposition: Outpatient   Postop Pain Control: Uneventful            Sign Out: Well controlled pain   PONV: No   Neuro/Psych: Uneventful            Sign Out: Acceptable/Baseline neuro status   Airway/Respiratory: Uneventful            Sign Out: Acceptable/Baseline resp. status   CV/Hemodynamics: Uneventful            Sign Out: Acceptable CV status; No obvious hypovolemia; No obvious fluid overload   Other NRE: NONE   DID A NON-ROUTINE EVENT OCCUR? No           Last vitals:  Vitals Value Taken Time   /59 01/27/24 1040   Temp 97.6  F (36.4  C) 01/27/24 1030   Pulse 70 01/27/24 1046   Resp 16 01/27/24 1046   SpO2 91 % 01/27/24 1045   Vitals shown include unfiled device data.    Electronically Signed By: Melchor Kauffman MD  January 27, 2024  1:06 PM

## 2024-01-27 NOTE — H&P
[facility]    History and Physical - Hospitalist Service       Date of Admission:  1/26/2024  Primary Care Physician   Physician No Ref-Primary  CONSULTANTS: Urology, general surgery    Assessment & Plan      Tee Escudero is a 64 year old male who has a previous history of multiple skin abscesses requiring I&D's, previous left knee MRSA infection requiring surgery, who has a history of renal stones presented to [facility] ER on 1/25/2024 with a groin cellulitis with increased tenderness, redness, heat, and swelling.  He has a history of MRSA in the past.  Had been on Bactrim in the past and he actually tried taking some of his leftover home antibiotics for this.  In the emergency room he was found to have an elevated white blood cell count of 21.7, his CRP was elevated, and his lactic acid was normal.  He also presented with acute renal failure with a creatinine up to 2.17.  He had a CT done showing no abscess or gas formation but does have left-sided renal stones with mild to moderate hydronephrosis along with scrotal skin thickening and edema left greater than right with out an enhancing drainable abscess. There is edema in the subcutaneous fat surrounding the left inguinal canal with lymphadenopathy but no evidence of gas within the soft tissues.  Patient was started on vancomycin, Zosyn, clindamycin at the outside facility and they requested transfer so the patient could be seen by urology and general surgery.   Patient is seen as a direct admission.  Currently denies any fever, chills, sweats.  States that his groin is about the same if not a little bit worse but his pain is stable.  Patient had some drainage out of the area yesterday and he thinks that there is an abscess.  Patient notes thickening of the skin of his left scrotum along with his gluteal cleft on that side.  Patient is making urine.  He has no other new complaints.    Perineal cellulitis, left inguinal  lymphadenopathy, possible abscess  Patient presented to [facility] ER with 3 days of increased perineal redness, tenderness, and possible abscess.  Most certainly has a cellulitis.  Concern is that the patient could progress to Dipesh's gangrene.  Patient had a CT scan at the outside facility and did not show any drainable abscess.  He was sent to [facility] to have urology and general surgery involved just in case the patient should not get worse.  I have consulted both services.  Patient's cellulitis currently looks stable.  Will be started on IV fluids.  I will continue him on vancomycin, Zosyn, clindamycin that was started at the outside facility have pharmacy dose his antibiotics.  For pain he will be given Tylenol, oxycodone, and IV Dilaudid for breakthrough.  He has blood cultures pending from the outside facility which are still pending.  At this point I would not repeat a CT scan unless the patient is getting worse as I think it would be too soon to do.  Could consider this in the next day or 2.  Will also check an INR just in case the patient should require surgery.  Patient's lactic acid was normal and I do not suspect the patient is septic at this time.    Acute renal failure, prerenal versus infection versus obstructive uropathy versus ibuprofen induced  Patient presented at the outside facility with a creatinine at 2.17.  This is in the setting of being a little dehydrated, having an active infection but without sepsis, use of ibuprofen, and also was found to have mild to moderate left-sided hydronephrosis on CT scan.  Patient was started on IV fluids and his creatinine has come down to 1.87.  I will continue him on fluids and follow daily chemistries.  Patient did have a urinalysis which was not impressive but did show some ketones.  On admission the patient's BUN was also elevated consistent with being prerenal.    History of MRSA  Patient needs to be in i contact solation    Left-sided renal stones  "with mild to moderate hydronephrosis  Seen on CT scans.  Patient does have a history of kidney stones in the past.  Patient does have acute renal failure as well.  Urology has been consulted.  I will place the patient on Flomax and IV fluids.  Patient's urinalysis did not show any blood.       Discussed plan of care with friend bedside  Diet: Regular Diet Adult    DVT Prophylaxis: Pneumatic Compression Devices in case surgery is necessary  Vital Catheter: Not present  Lines: None     Cardiac Monitoring: None    RESTRAINTS: Not  Code Status: Full Code  , patient needs a healthcare directive as he is from [place] and his children are still there.  He has friends here in [place] that he would like to speak for him if he cannot speak for himself.  I placed a spiritual consult to have a healthcare directive done.       This document was created using voice recognition technology.  Please excuse any typographical errors that may have occurred.  Please call with any questions.                        # Overweight: Estimated body mass index is 27.89 kg/m  as calculated from the following:    Height as of 1/25/24: 1.803 m (5' 11\").    Weight as of 1/25/24: 90.7 kg (200 lb).              Disposition Plan      Expected Discharge Date: 01/28/2024           patient is good to be here for days.       Lee Batista MD  Hospitalist Service  [facility]  Securely message with [employer] (more info)  Text page via [facility] Paging/Directory     Length of stay: Anticipate greater than 2 midnight hospitalization for evaluation and treatment of peroneal cellulitis at risk to advance to dean's gangrene          ______________________________________________________________________    Chief Complaint   Perineal cellulitis worrisome for abscess    History is obtained from the patient  Friend bedside, reviewed epic    History of Present Illness   Tee Escudero is a 64 year old male who has a previous history of " multiple skin abscesses requiring I&D's, previous left knee MRSA infection requiring surgery, who has a history of renal stones presented to [facility] ER on 1/25/2024 with a groin cellulitis with increased tenderness, redness, heat, and swelling.  He has a history of MRSA in the past.  Had been on Bactrim in the past and he actually tried taking some of his leftover home antibiotics for this.  In the emergency room he was found to have an elevated white blood cell count of 21.7, his CRP was elevated, and his lactic acid was normal.  He also presented with acute renal failure with a creatinine up to 2.17.  He had a CT done showing no abscess or gas formation but does have left-sided renal stones with mild to moderate hydronephrosis along with scrotal skin thickening and edema left greater than right with out an enhancing drainable abscess. There is edema in the subcutaneous fat surrounding the left inguinal canal with lymphadenopathy but no evidence of gas within the soft tissues.  Patient was started on vancomycin, Zosyn, clindamycin at the outside facility and they requested transfer so the patient could be seen by urology and general surgery.   Patient is seen as a direct admission.  Currently denies any fever, chills, sweats.  States that his groin is about the same if not a little bit worse but his pain is stable.  Patient had some drainage out of the area yesterday and he thinks that there is an abscess.  Patient notes thickening of the skin of his left scrotum along with his gluteal cleft on that side.  Patient is making urine.  He has no other new complaints.      Past Medical History    No past medical history on file.  Multiple I&D's for previous skin abscesses, MRSA    Past Surgical History   No past surgical history on file.  Left knee I&D    Prior to Admission Medications medications reviewed with the patient  Prior to Admission Medications   Prescriptions Last Dose Informant Patient  Reported? Taking?   ibuprofen (ADVIL/MOTRIN) 200 MG tablet   Yes No   Sig: Take 400 mg by mouth daily as needed for pain   meclizine 25 MG CHEW  Self Yes No   Sig: Take 25 mg by mouth every 6 hours as needed for dizziness      Facility-Administered Medications: None        Allergies   No Known Allergies     REVIEW OF SYSTEMS:  A comprehensive review of systems was negative except for items noted in the HPI.  Patient currently denies any fever, chills, sweats, nausea, vomiting, diarrhea, shortness of breath, or chest pain.        Physical Exam   Vital Signs: Temp: 97.8  F (36.6  C) Temp src: Oral BP: 128/71 Pulse: 87   Resp: 20 SpO2: 98 % O2 Device: None (Room air)    Weight: 0 lbs 0 oz    General appearance: Patient is alert and oriented x3, no apparent distress, pleasant and conversing normally, speaking in full sentences, appears older than stated age, sitting up in bed  HEENT:  Atraumatic/normal cephalic, EOMI, Pupils equally round and reactive to light, sclera non-icteric, Mucous membranes are moist  NECK:  supple without bruit or lymphadenopathy  RESPIRATORY: Clear to auscultation bilateral, good air movement  CARDIOVASCULAR: Regular rate and rhythm, normal S1/S2, no murmurs, rubs, or gallops present, peripheral pulses intact  GASTROINTESTINAL: Non-distended, non-tender, soft, bowel sounds present throughout, no mass or hepatosplenomegaly  MUSCULOSKELETAL: without deformity, normal range of motion  SKIN:  Warm, dry, no rashes, no mottling  NEUROLOGIC:  Cranial nerves II-XII intact, without any focal deficits, strength 5/5 throughout  EXTREMITIES:  Moves all extremities, no clubbing, cyanosis, nor edema  :  Vital not present, has erythema in his perineum left greater than right with his left scrotum swollen, red, and tender.  He also has an area of induration more caudal to his left gluteal cleft without current drainage, the area is tender to palpation      Data     I have personally reviewed the following  data over the past 24 hrs:    21.7 (H)  \   15.8   / 231     136 100 27.0 (H) /  91   4.1 20 (L) 1.87 (H) \     ALT: 24 AST: 20 AP: 90 TBILI: 0.9   ALB: 4.0 TOT PROTEIN: 7.4 LIPASE: N/A     Procal: N/A CRP: 177.13 (H) Lactic Acid: 1.0         Imaging:   Results for orders placed or performed during the hospital encounter of 01/25/24   CT Abdomen Pelvis w Contrast    Narrative    EXAM: CT ABDOMEN PELVIS W CONTRAST  LOCATION: [facility]  DATE: 1/25/2024    INDICATION: Left perirectal and groin area abscess vs cellulitis, r/o Dipesh's gangrene.  COMPARISON: None.  TECHNIQUE: CT scan of the abdomen and pelvis was performed following injection of IV contrast. Multiplanar reformats were obtained. Dose reduction techniques were used.  CONTRAST: Isovue 370, 98mL    FINDINGS:   LOWER CHEST: Minimal bibasilar atelectasis. Coronary artery calcification.    HEPATOBILIARY: Hepatic steatosis. Gallbladder and biliary system unremarkable.    PANCREAS: Normal.    SPLEEN: Normal.    ADRENAL GLANDS: Normal.    KIDNEYS/BLADDER: Moderate-to-marked left renal atrophy with significant cortical scarring. Mild-to-moderate left-sided hydronephrosis with mild left ureteral dilatation down to the level of a distal left ureteral conglomeration of calculi extending over   a length of 1.6 cm. The largest distal calculus measures 0.9 x 0.6 cm in this group. No right-sided hydronephrosis or ureteral calculi. Decompressed bladder.    BOWEL: No obstruction or inflammatory change. Appendix unremarkable.    LYMPH NODES: Enlarged left inguinal and external iliac chain lymph nodes, likely reactive.    VASCULATURE: Normal-caliber aorta. Minimal vascular calcification.    PELVIC ORGANS: Diffuse scrotal skin thickening with scrotal edema, greater on the left. Edema extends posterior from the scrotum into the medial aspect of the left thigh and superiorly along the left inguinal canal. No evidence for gas within the soft    tissues. No drainable subcutaneous fluid collection/abscess. No significant free fluid within the pelvis.    MUSCULOSKELETAL: Fat-containing bilateral inguinal hernias. Minimal degenerative osteoarthrosis both hips and throughout the spine.      Impression    IMPRESSION:   1.  Diffuse scrotal skin thickening and edema, greater on the left. Edema extends posteriorly from the scrotum into the medial aspect of the left thigh and gluteal region without evidence for peripherally enhancing drainable abscess. Additionally, there   is edema in the subcutaneous fat plane surrounding the left inguinal canal up into the left groin region. No evidence for gas within the soft tissues.    2.  Enlarged left inguinal and left external iliac chain lymph nodes, reactive.    3.  Chronic atrophy left kidney with chronic obstruction of the left urinary collecting system as detailed above.    4.  Hepatic steatosis.     Procedures: none  labs, orders, and medications myself

## 2024-01-27 NOTE — OP NOTE
OPERATIVE REPORT    PREOPERATIVE DIAGNOSIS: Left ureteral stone, scrotal abscess    POSTOPERATIVE DIAGNOSIS: Same    PROCEDURES PERFORMED: Cystoscopy, left retrograde pyelogram, interpretation of fluoroscopic images, left ureteroscopy with thulium laser lithotripsy and stone basketing, left ureteral stent placement.  Incision and drainage of abscess of the left groin and perineum  22 modifier for difficult and lengthy case    SURGEON: Buck Allen M.D.    ANESTHESIA: General    COMPLICATIONS: None.     SIGNIFICANT FINDINGS: Extremely large and dense ureteral stone in the distal ureter.  The Glidewire first would not pass and needed to be passed after breaking up the stone to some degree.  I went through 4 separate laser fibers to break up the stone.  The ureteral stone part of the procedure took well over 1 hour.        BRIEF OPERATIVE INDICATIONS: Tee Escudero is a(n) 64 year old male who presented with A distal left ureteral stone and concern for an abscess of the left groin and perineum.  He presents now for ureteroscopy I also plan on draining the potential abscess in the left groin and perineum..  After a discussion of all risks, benefits, and alternatives, the patient elected to proceed with definitive stone management. The patient understands the potential need for more than one procedure to eliminate all stone burden.      DESCRIPTION OF PROCEDURE:  After informed consent was obtained, the patient was transported to the operating room & placed supine on the table. After adequate anesthesia was induced, the patient was placed in lithotomy and prepped and draped in the usual sterile fashion. A timeout was taken to confirm correct patient, procedure and laterality. Pre-operative IV antibiotics were administered.     I introduced the 22 Albanian rigid cystoscope through the urethra into the bladder and perform cystoscopy.  The bladder was normal throughout.  I cannulated the left ureteral orifice with a  ureteral catheter and I performed a retrograde pyelogram.  The stone was large and dense in the distal left ureter.  I was able to get contrast above the stone into the dilated ureter.  I then attempted to pass a Glidewire into the left kidney but the Glidewire would not pass.  I then inserted the rigid ureteroscope and attempted to pass a Glidewire past the stone through the ureteroscope but it also would not pass after multiple attempts.  I then used a 3 six 5  m thulium laser fiber and I essentially drilled a hole in the middle of the stone until I reached the ureter above it.  I passed a Glidewire through this opening in the stone and backloaded off the scope.  I clamped the Glidewire to the drape.  I then repassed the ureteroscope into the distal left ureter and I used a 3 six 5  m thulium laser fiber to make up the stone to multiple fragments.  This part of the case was quite lengthy.  The stone was quite dense.  I used up 3 separate laser fibers to break up the stone and then was on my fourth fiber by the time the stone fragmented completely.  I then used the stone basket to basket out the fragments individually and placed into the bladder.  I could eventually perform ureteroscopy above the stone and no stone fragments remained.  This part of the case took over 1 hour.  I injected contrast into the left kidney and backloaded off the scope.  I reloaded the cystoscope over the Glidewire until the left ureteral orifice was visualized.  I passed a 7 x 26 double-J ureteral stent over the wire.  The wire was pulled back and a good curl could be seen in the renal pelvis with fluoroscopy.  A good curl was seen in the bladder with direct visualization.  I drained the stone fragments in the bladder.  I drained the bladder with a 16 Italian coudé tip Vital catheter.    I next turned my attention towards the perineum and left groin.  There was some erythema overlying the left groin crease into the perineum on the left  side.  The area of greatest erythema was in the perineum.  I used a 15 blade to gurpreet open this area and pus emitted out from the area.  I then finger fractured open the abscess cavity and then I opened the skin superiorly along the groin crease to open up the abscess cavity completely.  I used Bovie electrocautery to open up the skin.  I collected cultures.  I irrigated out the abscess cavity thoroughly.  I used cautery for irrigation.  I then packed this with a Kerlix gauze covered by ABD and mesh underwear.  The procedure was concluded at this point.    The patient tolerated procedure well without complications.  He went to the postanesthetic care unit in good condition.  He will need dressing changes until his wound heals.  For the stent, I will recommend that the stent remain in place for at least 2 weeks given how impacted the stone was.  After stent removal he will require an ultrasound 3 months later to make certain he does not form hydronephrosis, he is at risk for formation of a distal ureteral stricture given how impacted the stone was today.

## 2024-01-28 LAB — VANCOMYCIN SERPL-MCNC: 8.3 UG/ML

## 2024-01-28 PROCEDURE — 36415 COLL VENOUS BLD VENIPUNCTURE: CPT | Performed by: HOSPITALIST

## 2024-01-28 PROCEDURE — 99232 SBSQ HOSP IP/OBS MODERATE 35: CPT | Performed by: UROLOGY

## 2024-01-28 PROCEDURE — 86140 C-REACTIVE PROTEIN: CPT | Performed by: STUDENT IN AN ORGANIZED HEALTH CARE EDUCATION/TRAINING PROGRAM

## 2024-01-28 PROCEDURE — 258N000003 HC RX IP 258 OP 636: Performed by: UROLOGY

## 2024-01-28 PROCEDURE — 120N000001 HC R&B MED SURG/OB

## 2024-01-28 PROCEDURE — 250N000011 HC RX IP 250 OP 636: Performed by: UROLOGY

## 2024-01-28 PROCEDURE — 250N000013 HC RX MED GY IP 250 OP 250 PS 637: Performed by: UROLOGY

## 2024-01-28 PROCEDURE — 250N000011 HC RX IP 250 OP 636

## 2024-01-28 PROCEDURE — 99233 SBSQ HOSP IP/OBS HIGH 50: CPT | Performed by: STUDENT IN AN ORGANIZED HEALTH CARE EDUCATION/TRAINING PROGRAM

## 2024-01-28 PROCEDURE — 80202 ASSAY OF VANCOMYCIN: CPT | Performed by: HOSPITALIST

## 2024-01-28 RX ORDER — VANCOMYCIN HYDROCHLORIDE 1 G/200ML
1000 INJECTION, SOLUTION INTRAVENOUS EVERY 12 HOURS
Status: DISCONTINUED | OUTPATIENT
Start: 2024-01-28 | End: 2024-01-31

## 2024-01-28 RX ADMIN — VANCOMYCIN HYDROCHLORIDE 1000 MG: 1 INJECTION, SOLUTION INTRAVENOUS at 21:16

## 2024-01-28 RX ADMIN — PIPERACILLIN AND TAZOBACTAM 3.38 G: 3; .375 INJECTION, POWDER, FOR SOLUTION INTRAVENOUS at 04:15

## 2024-01-28 RX ADMIN — PIPERACILLIN AND TAZOBACTAM 3.38 G: 3; .375 INJECTION, POWDER, FOR SOLUTION INTRAVENOUS at 10:56

## 2024-01-28 RX ADMIN — SODIUM CHLORIDE, POTASSIUM CHLORIDE, SODIUM LACTATE AND CALCIUM CHLORIDE: 600; 310; 30; 20 INJECTION, SOLUTION INTRAVENOUS at 11:58

## 2024-01-28 RX ADMIN — PIPERACILLIN AND TAZOBACTAM 3.38 G: 3; .375 INJECTION, POWDER, FOR SOLUTION INTRAVENOUS at 00:14

## 2024-01-28 RX ADMIN — OXYCODONE HYDROCHLORIDE 2.5 MG: 5 TABLET ORAL at 09:02

## 2024-01-28 RX ADMIN — ACETAMINOPHEN 650 MG: 325 TABLET, FILM COATED ORAL at 11:15

## 2024-01-28 RX ADMIN — SODIUM CHLORIDE, POTASSIUM CHLORIDE, SODIUM LACTATE AND CALCIUM CHLORIDE: 600; 310; 30; 20 INJECTION, SOLUTION INTRAVENOUS at 01:06

## 2024-01-28 RX ADMIN — TAMSULOSIN HYDROCHLORIDE 0.4 MG: 0.4 CAPSULE ORAL at 08:51

## 2024-01-28 RX ADMIN — HYDROMORPHONE HYDROCHLORIDE 0.4 MG: 0.2 INJECTION, SOLUTION INTRAMUSCULAR; INTRAVENOUS; SUBCUTANEOUS at 10:48

## 2024-01-28 RX ADMIN — OXYCODONE HYDROCHLORIDE 5 MG: 5 TABLET ORAL at 12:52

## 2024-01-28 RX ADMIN — OXYCODONE HYDROCHLORIDE 5 MG: 5 TABLET ORAL at 17:10

## 2024-01-28 RX ADMIN — HYDROMORPHONE HYDROCHLORIDE 0.4 MG: 0.2 INJECTION, SOLUTION INTRAMUSCULAR; INTRAVENOUS; SUBCUTANEOUS at 14:56

## 2024-01-28 RX ADMIN — OXYCODONE HYDROCHLORIDE 5 MG: 5 TABLET ORAL at 21:16

## 2024-01-28 RX ADMIN — ACETAMINOPHEN 650 MG: 325 TABLET, FILM COATED ORAL at 21:16

## 2024-01-28 RX ADMIN — CLINDAMYCIN PHOSPHATE 900 MG: 900 INJECTION, SOLUTION INTRAVENOUS at 12:49

## 2024-01-28 RX ADMIN — CLINDAMYCIN PHOSPHATE 900 MG: 900 INJECTION, SOLUTION INTRAVENOUS at 04:56

## 2024-01-28 ASSESSMENT — ACTIVITIES OF DAILY LIVING (ADL)
ADLS_ACUITY_SCORE: 20

## 2024-01-28 NOTE — PROGRESS NOTES
SW did chart review and noticed pt has no coverage information regarding insurance noted on facesheet. SW attempted to meet with pt, however, pt was sleeping when SW entered pt's room. SW will try to Nikolai back later today if time permits to discuss.     Janet LOCKETT, [facility]  Inpatient Care Coordination   [facility]   [phone]

## 2024-01-28 NOTE — PLAN OF CARE
Goal Outcome Evaluation:      Plan of Care Reviewed With: patient    Overall Patient Progress: no changeOverall Patient Progress: no change       Up ind in room. VSS on room air. Tolerating regular diet.  LR @125ml/hr. IV clindamycin, vanco and zosyn given. Pain 5/10, PRN meds given, see MAR.discharge pending, will continue with plan of care,.

## 2024-01-28 NOTE — PHARMACY-ADMISSION MEDICATION HISTORY
Pharmacist Admission Medication History    Admission medication history is complete. The information provided in this note is only as accurate as the sources available at the time of the update.    Information Source(s): Patient via phone    Pertinent Information: None    Changes made to PTA medication list:  Added: None  Deleted: Ibuprofen, Meclizine  Changed: None    Allergies reviewed with patient and updates made in EHR: no    Medication History Completed By: Billie Matthew RPH 1/28/2024 8:09 AM    Prior to Admission medications    Not on File

## 2024-01-28 NOTE — PROGRESS NOTES
Urology    Patient has been stable since surgery yesterday  Dressings have been saturated periodically  We discussed operative findings at the bedside today.    On exam he is in no acute distress  I changed his dressings.  The wound is clean with no evidence of infection or residual abscess.    A/P: He will need to continue antibiotics as we await culture results.  Cultures taken in the operating room yesterday from abscess.  Recommend daily dressing changes.  I will consult the wound care nurse to see him tomorrow.  He has an indwelling ureteral stent and this will need to be in place for at least 2 weeks.  He will prefer to follow-up for stent removal at the urology clinic in [place].

## 2024-01-28 NOTE — PROGRESS NOTES
[facility]    Medicine Progress Note - Hospitalist Service  Date of Admission: 1/26/2024     Assessment & Plan         Tee sEcudero is a 64 year old male who has a previous history of multiple skin abscesses requiring I&D's, previous left knee MRSA infection requiring surgery, who has a history of renal stones presented to [facility] ER on 1/25/2024 with a groin cellulitis with increased tenderness, redness, heat, and swelling.  He has a history of MRSA in the past.  Had been on Bactrim in the past and he actually tried taking some of his leftover home antibiotics for this.  In the emergency room he was found to have an elevated white blood cell count of 21.7, his CRP was elevated, and his lactic acid was normal.  He also presented with acute renal failure with a creatinine up to 2.17.  He had a CT done showing no abscess or gas formation but does have left-sided renal stones with mild to moderate hydronephrosis along with scrotal skin thickening and edema left greater than right with out an enhancing drainable abscess. There is edema in the subcutaneous fat surrounding the left inguinal canal with lymphadenopathy but no evidence of gas within the soft tissues.  Patient was started on vancomycin, Zosyn, clindamycin at the outside facility and they requested transfer so the patient could be seen by urology and general surgery.              Patient is seen as a direct admission.  Currently denies any fever, chills, sweats.  States that his groin is about the same if not a little bit worse but his pain is stable.  Patient had some drainage out of the area yesterday and he thinks that there is an abscess.  Patient notes thickening of the skin of his left scrotum along with his gluteal cleft on that side.  Patient is making urine.  He has no other new complaints.     Staph aureus perineal cellulitis, left inguinal lymphadenopathy, possible abscess  Patient [facility] [place]  [facility] with 3 days of increased perineal redness, tenderness, and possible abscess.  Most certainly has a cellulitis.  Concern is that the patient could progress to Dipesh's gangrene.  Patient had a CT scan at the outside facility and did not show any drainable abscess.  He was sent to [facility] to have urology and general surgery involved just in case the patient should not get worse.  I have consulted both services.  Patient's cellulitis currently looks stable.  Will be started on IV fluids.  I will continue him on vancomycin, Zosyn, clindamycin that was started at the outside facility have pharmacy dose his antibiotics.  For pain he will be given Tylenol, oxycodone, and IV Dilaudid for breakthrough.  He has blood cultures pending from the outside facility which are still pending.   1/27/2024: Urology consulted for ureteral stone and while patient was intraoperative performed incision and drainage of cellulitis site, revealing drainage of pustular fluid collection. Wound packed. Cultures obtained and pending.  1/28/2024: Cultures returning with GPC and preliminarily Staph aureus. Zosyn + clindamycin discontinued. Continue vancomycin pending final culture and sensitivities.     Acute renal failure, prerenal versus infection versus obstructive uropathy versus ibuprofen induced, improving  Patient presented at the outside facility with a creatinine at 2.17.  This is in the setting of being a little dehydrated, having an active infection but without sepsis, use of ibuprofen, and also was found to have mild to moderate left-sided hydronephrosis on CT scan.  Patient was started on IV fluids and his creatinine has come down to 1.87.  I will continue him on fluids and follow daily chemistries.  Patient did have a urinalysis which was not impressive but did show some ketones.  On admission the patient's BUN was also elevated consistent with being prerenal.  1/27/2024: Urology performed removal of stone and stent placement.  "Continue post-op velasquez per urology.  1/28/2024: Creatinine continuing to downtrend to 1.48. Evlasquez now removed by urology.     History of MRSA  Patient needs to be in i contact solation     Left-sided renal stones with mild to moderate hydronephrosis  Seen on CT scans.  Patient does have a history of kidney stones in the past.  Patient does have acute renal failure as well.  Urology has been consulted.  I will place the patient on Flomax and IV fluids.  Patient's urinalysis did not show any blood.  Diet: Orders Placed This Encounter      Regular Diet Adult     DVT Prophylaxis: Pneumatic Compression Devices  Velasquez: In place  Code Status: Full Code    Expected discharge: pending final cultures and sensitivities    The patient's care was discussed with the Bedside Nurse and Patient.  I personally spent 45 minutes on chart review, documentation, coordination, and bedside management of patient.    Dharmesh Guzman MD, S  Hospitalist Service  [facility]  ______________________________________________________________________    Interval History   Nursing notes reviewed. Patient reports some perineal pain after changing of dressings. Happy velasquez is out.    A full 10+ point review of systems was performed and found to be negative with the exception of those items noted here.    Physical Exam   Temp: 97.6  F (36.4  C) Temp src: Oral BP: 126/62 Pulse: 52   Resp: 16 SpO2: 94 % O2 Device: None (Room air) Oxygen Delivery: 10 LPM Height: 180.3 cm (5' 11\") Weight: 91.6 kg (202 lb)  Estimated body mass index is 28.17 kg/m  as calculated from the following:    Height as of this encounter: 1.803 m (5' 11\").    Weight as of this encounter: 91.6 kg (202 lb).    General: Very pleasant male resting comfortably in hospital bed.  Awake, alert, interactive. Appears slightly uncomfortable.  HEENT: Normocephalic, atraumatic.  PERRL, EOMI.  Conjunctiva clear, sclerae anicteric.  Mucous membranes moist.  Cardiac: Regular rate " and rhythm without murmur, gallop, or rub.  No peripheral edema.  Respiratory: Normal work of breathing.  Clear to auscultation bilaterally without wheezing, rales, or rhonchi.  GI: Normal, active bowel sounds.  Abdomen soft, nontender, nondistended.  : Incision from left gluteal cleft to left inguinal region; packed. Vital in place draining sanginous/yellow urine.  Musculoskeletal: Moving all extremities appropriately.  Skin: No rashes or abrasions on exposed skin.  Neurologic: Alert and oriented x4.  Cranial nerves II through XII grossly intact.  Psychologic: Appropriate mood and affect.    Data   All laboratory results and other diagnostic data from the past 24 hours is available in Epic and has been personally reviewed.    Recent Labs   Lab 01/27/24  1032 01/26/24  1935 01/26/24  0825 01/25/24  2157   WBC 9.5 13.5*  --  21.7*   HGB 13.7 13.8  --  15.8   MCV 92 92  --  90    212  --  231   INR  --  1.10  --   --     136  --  136   POTASSIUM 4.4 4.2  --  4.1   CHLORIDE 106 103  --  100   CO2 22 23  --  20*   BUN 16.5 22.7  --  27.0*   CR 1.48* 1.62* 1.87* 2.17*   ANIONGAP 10 10  --  16*   TEMITOPE 8.6* 8.7*  --  9.4   * 87  --  91   ALBUMIN  --   --   --  4.0   PROTTOTAL  --   --   --  7.4   BILITOTAL  --   --   --  0.9   ALKPHOS  --   --   --  90   ALT  --   --   --  24   AST  --   --   --  20       Imaging results reviewed over the past 24 hrs:   No results found for this or any previous visit (from the past 24 hour(s)).    I personally reviewed: no images or EKG's today.

## 2024-01-28 NOTE — PLAN OF CARE
To Do:  End of Shift Summary  For vital signs and complete assessments, please see documentation flowsheets.     Pertinent assessments: A&Ox4, on RA, VSS.  PRN dilaudid x2, oxyx1, and tylenol x1, and ice utilized to manage pain. Continues on IV Vanco. Regular diet. See flowsheets for incision, dressing/packing changed by surgeon.     Major Shift Events: Surgeon changed packing and dressing. Vital removed, voiding    Treatment Plan: Encourage fluids. Monitor urine output.     Bedside Nurse: Carmen Holm RN

## 2024-01-28 NOTE — PROGRESS NOTES
[facility]    Medicine Progress Note - Hospitalist Service  Date of Admission: 1/26/2024     Assessment & Plan         Tee Escudero is a 64 year old male who has a previous history of multiple skin abscesses requiring I&D's, previous left knee MRSA infection requiring surgery, who has a history of renal stones presented to [facility] ER on 1/25/2024 with a groin cellulitis with increased tenderness, redness, heat, and swelling.  He has a history of MRSA in the past.  Had been on Bactrim in the past and he actually tried taking some of his leftover home antibiotics for this.  In the emergency room he was found to have an elevated white blood cell count of 21.7, his CRP was elevated, and his lactic acid was normal.  He also presented with acute renal failure with a creatinine up to 2.17.  He had a CT done showing no abscess or gas formation but does have left-sided renal stones with mild to moderate hydronephrosis along with scrotal skin thickening and edema left greater than right with out an enhancing drainable abscess. There is edema in the subcutaneous fat surrounding the left inguinal canal with lymphadenopathy but no evidence of gas within the soft tissues.  Patient was started on vancomycin, Zosyn, clindamycin at the outside facility and they requested transfer so the patient could be seen by urology and general surgery.              Patient is seen as a direct admission.  Currently denies any fever, chills, sweats.  States that his groin is about the same if not a little bit worse but his pain is stable.  Patient had some drainage out of the area yesterday and he thinks that there is an abscess.  Patient notes thickening of the skin of his left scrotum along with his gluteal cleft on that side.  Patient is making urine.  He has no other new complaints.     Perineal cellulitis, left inguinal lymphadenopathy, possible abscess  Patient presented to [facility] ER with 3  days of increased perineal redness, tenderness, and possible abscess.  Most certainly has a cellulitis.  Concern is that the patient could progress to Dipesh's gangrene.  Patient had a CT scan at the outside facility and did not show any drainable abscess.  He was sent to [facility] to have urology and general surgery involved just in case the patient should not get worse.  I have consulted both services.  Patient's cellulitis currently looks stable.  Will be started on IV fluids.  I will continue him on vancomycin, Zosyn, clindamycin that was started at the outside facility have pharmacy dose his antibiotics.  For pain he will be given Tylenol, oxycodone, and IV Dilaudid for breakthrough.  He has blood cultures pending from the outside facility which are still pending.   1/27/2024: Urology consulted for ureteral stone and while patient was intraoperative performed incision and drainage of cellulitis site, revealing drainage of pustular fluid collection. Wound packed. Cultures obtained and pending.     Acute renal failure, prerenal versus infection versus obstructive uropathy versus ibuprofen induced, improving  Patient presented at the outside facility with a creatinine at 2.17.  This is in the setting of being a little dehydrated, having an active infection but without sepsis, use of ibuprofen, and also was found to have mild to moderate left-sided hydronephrosis on CT scan.  Patient was started on IV fluids and his creatinine has come down to 1.87.  I will continue him on fluids and follow daily chemistries.  Patient did have a urinalysis which was not impressive but did show some ketones.  On admission the patient's BUN was also elevated consistent with being prerenal.  1/27/2024: Urology performed removal of stone and stent placement. Continue post-op velasquez per urology.     History of MRSA  Patient needs to be in i contact solation     Left-sided renal stones with mild to moderate hydronephrosis  Seen on CT  "scans.  Patient does have a history of kidney stones in the past.  Patient does have acute renal failure as well.  Urology has been consulted.  I will place the patient on Flomax and IV fluids.  Patient's urinalysis did not show any blood.  Diet: Orders Placed This Encounter      Regular Diet Adult     DVT Prophylaxis: Pneumatic Compression Devices  Vital: In place  Code Status: Full Code    Expected discharge: pending final cultures and sensitivities    The patient's care was discussed with the Bedside Nurse and Patient.  I personally spent 45 minutes on chart review, documentation, coordination, and bedside management of patient.    Dharmesh Guzman MD, S  Hospitalist Service  [facility]  ______________________________________________________________________    Interval History   Nursing notes reviewed. Taken to OR this morning. Doing well post-op.    A full 10+ point review of systems was performed and found to be negative with the exception of those items noted here.    Physical Exam   Temp: 97.9  F (36.6  C) Temp src: Oral BP: 95/42 Pulse: 56   Resp: 16 SpO2: 94 % O2 Device: None (Room air) Oxygen Delivery: 10 LPM Height: 180.3 cm (5' 11\") Weight: 91.6 kg (202 lb)  Estimated body mass index is 28.17 kg/m  as calculated from the following:    Height as of this encounter: 1.803 m (5' 11\").    Weight as of this encounter: 91.6 kg (202 lb).    General: Very pleasant male resting comfortably in hospital bed.  Awake, alert, interactive.  HEENT: Normocephalic, atraumatic.  PERRL, EOMI.  Conjunctiva clear, sclerae anicteric.  Mucous membranes moist.  Cardiac: Regular rate and rhythm without murmur, gallop, or rub.  No peripheral edema.  Respiratory: Normal work of breathing.  Clear to auscultation bilaterally without wheezing, rales, or rhonchi.  GI: Normal, active bowel sounds.  Abdomen soft, nontender, nondistended.  : Incision from left gluteal cleft to left inguinal region; packed. Vital in " place draining sanginous/yellow urine.  Musculoskeletal: Moving all extremities appropriately.  Skin: No rashes or abrasions on exposed skin.  Neurologic: Alert and oriented x4.  Cranial nerves II through XII grossly intact.  Psychologic: Appropriate mood and affect.    Data   All laboratory results and other diagnostic data from the past 24 hours is available in Epic and has been personally reviewed.    Recent Labs   Lab 01/27/24  1032 01/26/24  1935 01/26/24  0825 01/25/24  2157   WBC 9.5 13.5*  --  21.7*   HGB 13.7 13.8  --  15.8   MCV 92 92  --  90    212  --  231   INR  --  1.10  --   --     136  --  136   POTASSIUM 4.4 4.2  --  4.1   CHLORIDE 106 103  --  100   CO2 22 23  --  20*   BUN 16.5 22.7  --  27.0*   CR 1.48* 1.62* 1.87* 2.17*   ANIONGAP 10 10  --  16*   TEMITOPE 8.6* 8.7*  --  9.4   * 87  --  91   ALBUMIN  --   --   --  4.0   PROTTOTAL  --   --   --  7.4   BILITOTAL  --   --   --  0.9   ALKPHOS  --   --   --  90   ALT  --   --   --  24   AST  --   --   --  20       Imaging results reviewed over the past 24 hrs:   Recent Results (from the past 24 hour(s))   XR Surgery JUNE L/T 5 Min Fluoro w Stills    Narrative    This exam was marked as non-reportable because it will not be read by a   radiologist or a [facility] non-radiologist provider.           I personally reviewed: no images or EKG's today.

## 2024-01-29 LAB
BACTERIA ABSC ANAEROBE+AEROBE CULT: ABNORMAL
CREAT SERPL-MCNC: 1.39 MG/DL (ref 0.67–1.17)
CRP SERPL-MCNC: 30.28 MG/L
EGFRCR SERPLBLD CKD-EPI 2021: 57 ML/MIN/1.73M2

## 2024-01-29 PROCEDURE — 250N000011 HC RX IP 250 OP 636

## 2024-01-29 PROCEDURE — 250N000013 HC RX MED GY IP 250 OP 250 PS 637: Performed by: UROLOGY

## 2024-01-29 PROCEDURE — 99233 SBSQ HOSP IP/OBS HIGH 50: CPT | Performed by: STUDENT IN AN ORGANIZED HEALTH CARE EDUCATION/TRAINING PROGRAM

## 2024-01-29 PROCEDURE — 250N000011 HC RX IP 250 OP 636: Performed by: UROLOGY

## 2024-01-29 PROCEDURE — 258N000003 HC RX IP 258 OP 636: Performed by: UROLOGY

## 2024-01-29 PROCEDURE — 36415 COLL VENOUS BLD VENIPUNCTURE: CPT | Performed by: STUDENT IN AN ORGANIZED HEALTH CARE EDUCATION/TRAINING PROGRAM

## 2024-01-29 PROCEDURE — 120N000001 HC R&B MED SURG/OB

## 2024-01-29 PROCEDURE — G0463 HOSPITAL OUTPT CLINIC VISIT: HCPCS

## 2024-01-29 PROCEDURE — 82565 ASSAY OF CREATININE: CPT | Performed by: STUDENT IN AN ORGANIZED HEALTH CARE EDUCATION/TRAINING PROGRAM

## 2024-01-29 RX ADMIN — OXYCODONE HYDROCHLORIDE 5 MG: 5 TABLET ORAL at 09:49

## 2024-01-29 RX ADMIN — HYDROMORPHONE HYDROCHLORIDE 0.4 MG: 0.2 INJECTION, SOLUTION INTRAMUSCULAR; INTRAVENOUS; SUBCUTANEOUS at 23:04

## 2024-01-29 RX ADMIN — VANCOMYCIN HYDROCHLORIDE 1000 MG: 1 INJECTION, SOLUTION INTRAVENOUS at 20:38

## 2024-01-29 RX ADMIN — OXYCODONE HYDROCHLORIDE 5 MG: 5 TABLET ORAL at 16:34

## 2024-01-29 RX ADMIN — VANCOMYCIN HYDROCHLORIDE 1000 MG: 1 INJECTION, SOLUTION INTRAVENOUS at 08:14

## 2024-01-29 RX ADMIN — SODIUM CHLORIDE, POTASSIUM CHLORIDE, SODIUM LACTATE AND CALCIUM CHLORIDE: 600; 310; 30; 20 INJECTION, SOLUTION INTRAVENOUS at 10:07

## 2024-01-29 RX ADMIN — OXYCODONE HYDROCHLORIDE 5 MG: 5 TABLET ORAL at 03:56

## 2024-01-29 RX ADMIN — SODIUM CHLORIDE, POTASSIUM CHLORIDE, SODIUM LACTATE AND CALCIUM CHLORIDE: 600; 310; 30; 20 INJECTION, SOLUTION INTRAVENOUS at 00:57

## 2024-01-29 RX ADMIN — ACETAMINOPHEN 650 MG: 325 TABLET, FILM COATED ORAL at 09:49

## 2024-01-29 RX ADMIN — ACETAMINOPHEN 650 MG: 325 TABLET, FILM COATED ORAL at 16:35

## 2024-01-29 RX ADMIN — TAMSULOSIN HYDROCHLORIDE 0.4 MG: 0.4 CAPSULE ORAL at 08:14

## 2024-01-29 RX ADMIN — ACETAMINOPHEN 650 MG: 325 TABLET, FILM COATED ORAL at 03:56

## 2024-01-29 ASSESSMENT — ACTIVITIES OF DAILY LIVING (ADL)
ADLS_ACUITY_SCORE: 20

## 2024-01-29 NOTE — PHARMACY-VANCOMYCIN DOSING SERVICE
Pharmacy Vancomycin Note  Date of Service 2024  Patient's  1960   64 year old, male    Indication: Abscess and Skin and Soft Tissue Infection  Day of Therapy: 3  Current vancomycin regimen:  1250 mg IV q24h  Current vancomycin monitoring method: AUC  Current vancomycin therapeutic monitoring goal: 400-600 mg*h/L    InsightRX Prediction of Current Vancomycin Regimen  Loading dose: N/A  Regimen: 1250 mg IV every 24 hours.  Start time: 21:54 on 2024  Exposure target: AUC24 (range)400-600 mg/L.hr   AUC24,ss: 335 mg/L.hr  Probability of AUC24 > 400: 18 %  Ctrough,ss: 8.5 mg/L  Probability of Ctrough,ss > 20: 0 %  Probability of nephrotoxicity (Lodise JAZZ ): 5 %      Current estimated CrCl = Estimated Creatinine Clearance: 59.9 mL/min (A) (based on SCr of 1.48 mg/dL (H)).    Creatinine for last 3 days  2024:  9:57 PM Creatinine 2.17 mg/dL  2024:  8:25 AM Creatinine 1.87 mg/dL;  7:35 PM Creatinine 1.62 mg/dL  2024: 10:32 AM Creatinine 1.48 mg/dL    Recent Vancomycin Levels (past 3 days)  2024:  6:02 PM Vancomycin 8.3 ug/mL    Vancomycin IV Administrations (past 72 hours)                     vancomycin (VANCOCIN) 1,250 mg in 0.9% NaCl 250 mL intermittent infusion (mg) 1,250 mg New Bag 24 2154     1,250 mg New Bag 24 2333    vancomycin (VANCOCIN) 500 mg vial to attach to  mL bag (mg) 500 mg New Bag 24 1141    vancomycin (VANCOCIN) 1,500 mg in sodium chloride 0.9 % 250 mL intermittent infusion (mg) 1,500 mg New Bag 24 0105                    Nephrotoxins and other renal medications (From now, onward)      Start     Dose/Rate Route Frequency Ordered Stop    24 2100  vancomycin (VANCOCIN) 1,000 mg in 200 mL dextrose intermittent infusion         1,000 mg  200 mL/hr over 1 Hours Intravenous EVERY 12 HOURS 24 1950                 Contrast Orders - past 72 hours (72h ago, onward)      Start     Dose/Rate Route Frequency Stop    24  0920  iopamidol 61% (ISOVUE 300) 50 mL + sterile water for irrigation 50 mL  Status:  Discontinued           PRN 01/27/24 1056            Interpretation of levels and current regimen:  Vancomycin level is reflective of AUC less than 400    Has serum creatinine changed greater than 50% in last 72 hours: Yes    Urine output:  unable to determine    Renal Function: Improving    InsightRX Prediction of Planned New Vancomycin Regimen  Loading dose: N/A  Regimen: 1000 mg IV every 12 hours.  Start time: 21:54 on 01/28/2024  Exposure target: AUC24 (range)400-600 mg/L.hr   AUC24,ss: 526 mg/L.hr  Probability of AUC24 > 400: 93 %  Ctrough,ss: 16.6 mg/L  Probability of Ctrough,ss > 20: 21 %  Probability of nephrotoxicity (Lodise JAZZ 2009): 12 %      Plan:  Increase Dose to Vancomycin 1000mg IV q12h  Vancomycin monitoring method: AUC  Vancomycin therapeutic monitoring goal: 400-600 mg*h/L  Pharmacy will check vancomycin levels as appropriate in 1-3 Days.  Serum creatinine levels will be ordered daily for the first week of therapy and at least twice weekly for subsequent weeks.      Alley Claire, FredyD,BCPS

## 2024-01-29 NOTE — CONSULTS
[facility] Nurse Inpatient Assessment     Consulted for: Perineum    Patient History (according to provider note(s):      Tee Escudero is a 64 year old male who has a previous history of multiple skin abscesses requiring I&D's, previous left knee MRSA infection requiring surgery, who has a history of renal stones presented to [facility] ER on 1/25/2024 with a groin cellulitis with increased tenderness, redness, heat, and swelling.  He has a history of MRSA in the past.  Had been on Bactrim in the past and he actually tried taking some of his leftover home antibiotics for this.  In the emergency room he was found to have an elevated white blood cell count of 21.7, his CRP was elevated, and his lactic acid was normal.  He also presented with acute renal failure with a creatinine up to 2.17.  He had a CT done showing no abscess or gas formation but does have left-sided renal stones with mild to moderate hydronephrosis along with scrotal skin thickening and edema left greater than right with out an enhancing drainable abscess. There is edema in the subcutaneous fat surrounding the left inguinal canal with lymphadenopathy but no evidence of gas within the soft tissues.  Patient was started on vancomycin, Zosyn, clindamycin at the outside facility and they requested transfer so the patient could be seen by urology and general surgery.     Assessment:      Areas visualized during today's visit: Perineal area    Wound location: Left perineum and groin  Last photo: 1/29/24    Wound due to: Surgical Wound  Wound history/plan of care: Patient s/p incision and drainage of abscess of the left groin and perineum on 1/27/24.    Wound base: 90 % pink/red non-granular tissue, 10 % adipose tissue     Palpation of the wound bed: normal      Drainage: copious     Description of drainage: serosanguinous     Measurements (length x width x depth, in cm): 17  x 3  x  3.5 cm      Tunneling:  N/A     Undermining: N/A  Periwound skin: Intact      Color: normal and consistent with surrounding tissue      Temperature: normal   Odor: none  Pain: moderate to severe with palpation  Pain interventions prior to dressing change: oral    Treatment goal: Heal  and Infection control/prevention  STATUS: initial assessment  Supplies ordered: ordered Vashe       Treatment Plan:     Left groin/perineum wound(s): Daily and prn when saturated  Cleanse with Vashe  Pack with Vashe moistened gauze  Cover with ABD and secure with mesh underwear     Orders: Written    RECOMMEND PRIMARY TEAM ORDER: None, at this time  Education provided: plan of care and Hygiene  Discussed plan of care with: Patient and Nurse  [facility] nurse follow-up plan: weekly  Notify WO if wound(s) deteriorate.  Nursing to notify the Provider(s) and re-consult the [facility] Nurse if new skin concern.    DATA:     Current support surface: Standard  Standard gel/foam mattress (IsoFlex, Atmos air, etc)  Containment of urine/stool: Continent of bladder and Continent of bowel  BMI: Body mass index is 28.17 kg/m .   Active diet order: Orders Placed This Encounter      Regular Diet Adult     Output: I/O last 3 completed shifts:  In: 600 [I.V.:600]  Out: 875 [Urine:875]     Labs:   Recent Labs   Lab 01/27/24  1032 01/26/24  1935 01/25/24  2157   ALBUMIN  --   --  4.0   HGB 13.7 13.8 15.8   INR  --  1.10  --    WBC 9.5 13.5* 21.7*     Pressure injury risk assessment:   Sensory Perception: 4-->no impairment  Moisture: 4-->rarely moist  Activity: 3-->walks occasionally  Mobility: 4-->no limitation  Nutrition: 3-->adequate  Friction and Shear: 3-->no apparent problem  Tino Score: 21    SANDRA Morejon [facility] [employer]  Dept. Office Number: [phone]

## 2024-01-29 NOTE — CONSULTS
Vitaly DAWSON,  staff  have reviewed and edited with the following  student's note on 1/30/2024 at 1:26 PM.     SPIRITUAL HEALTH SERVICES - Consult Note   Med surg 5  Referral Source/Reason for Visit: [facility] Consult    Summary and Recommendations -  Pt came to the hospital with groin pain.  He shared that he relies on his bernard.    Plan: [facility] remains available upon request.    Dusty Bonilla M.Div.      Pager: [phone]     Assessment    Saw pt Tee Escudero per [facility] Consult.    Patient/Family Understanding of Illness and Goals of Care - Pt reported that he is being treated for groin pain.    Distress and Loss - Two years ago he lost part of his leg.    Strengths, Coping, and Resources   The community at his [employer] supports him.  He reminisced about the [place] trip to visit his family in [place].    Meaning, Beliefs, and Spirituality - Pt is affiliated with [employer] and looks to his bernard as a resource. He welcomed prayer.

## 2024-01-29 NOTE — PLAN OF CARE
Goal Outcome Evaluation:      Pertinent assessments: A&Ox4, on RA, VSS, on RA. Independent in the room. LS clear. BS active x4. PRN oxy and tylenol x1. PIV infusing LR @ 125 ml/hr. Incision, dressing/packing changed by wound nurse, premedicated with oxy and tylenol.     Major Shift Events: none    Treatment Plan: manage pain, wound dressing changes daily, IVF, IV abx    Bedside Nurse: Shama Pinon RN

## 2024-01-29 NOTE — PROGRESS NOTES
[facility] Urology Progress Note          Assessment and Plan:     Assessment:     POD 2 Cystoscopy, left retrograde pyelogram, interpretation of fluoroscopic images, left ureteroscopy with thulium laser lithotripsy and stone basketing, left ureteral stent placement.  Incision and drainage of abscess of the left groin and perineum  22 modifier for difficult and lengthy case   Cellulitis of perineum   Left ureteral stone    History of MRSA      Plan:   -Wound cares per WOC.  -Continue with antibiotics.  Follow cultures.   -Pain management per primary service.  -Will need to get ureteral stent removed in 2-4 weeks.  Will try to coordinate this for [place] office with Dr. Martines.    -Possible side effects with an indwelling ureteral stent such as urgency and frequency of urination, dysuria, hematuria, symptoms of urine reflux, and some achiness in the side. Indwelling ureteral stents need to be exchanged every three months or removed by three months.    -Will continue to follow along.    Nessa Nicolas PA-C   [facility]  [phone]               Interval History:     Doing well.  Perineal wound draining fair amount of bloody serosanguineous fluid.  Patient notes that when the dressings become saturated becomes very irritative.  Afebrile.  Some bradycardia.  Wound cultures show 1+ Staph aureus and 4+ Staph aureus.  Sensitivities in process.  On IV vancomycin.  Patient lost IV access earlier this morning.  No new labs.  Denies nausea, vomiting, fevers, chills. Left perineal would with bloody serosanguinous drainage.  No eschar or crepitus.  Tender.  Mild induration at the superior portion of the wound.                    Review of Systems:     The 5 point Review of Systems is negative other than noted in the HPI             Medications:     Current Facility-Administered Medications Ordered in Epic   Medication Dose Route Frequency Last Rate Last Admin    acetaminophen (TYLENOL) tablet 650 mg   650 mg Oral Q4H PRN   650 mg at 01/29/24 0949    Or    acetaminophen (TYLENOL) Suppository 650 mg  650 mg Rectal Q4H PRN        calcium carbonate (TUMS) chewable tablet 1,000 mg  1,000 mg Oral 4x Daily PRN        HYDROmorphone (DILAUDID) injection 0.2 mg  0.2 mg Intravenous Q2H PRN        HYDROmorphone (DILAUDID) injection 0.4 mg  0.4 mg Intravenous Q2H PRN   0.4 mg at 01/28/24 1456    lactated ringers infusion   Intravenous Continuous 125 mL/hr at 01/29/24 1007 New Bag at 01/29/24 1007    lidocaine (LMX4) cream   Topical Q1H PRN        lidocaine 1 % 0.1-1 mL  0.1-1 mL Other Q1H PRN        naloxone (NARCAN) injection 0.2 mg  0.2 mg Intravenous Q2 Min PRN        Or    naloxone (NARCAN) injection 0.4 mg  0.4 mg Intravenous Q2 Min PRN        Or    naloxone (NARCAN) injection 0.2 mg  0.2 mg Intramuscular Q2 Min PRN        Or    naloxone (NARCAN) injection 0.4 mg  0.4 mg Intramuscular Q2 Min PRN        ondansetron (ZOFRAN ODT) ODT tab 4 mg  4 mg Oral Q6H PRN        Or    ondansetron (ZOFRAN) injection 4 mg  4 mg Intravenous Q6H PRN        oxyCODONE (ROXICODONE) tablet 5 mg  5 mg Oral Q4H PRN   5 mg at 01/29/24 0949    oxyCODONE IR (ROXICODONE) half-tab 2.5 mg  2.5 mg Oral Q4H PRN   2.5 mg at 01/28/24 0902    polyethylene glycol (MIRALAX) Packet 17 g  17 g Oral BID PRN        senna-docusate (SENOKOT-S/PERICOLACE) 8.6-50 MG per tablet 1 tablet  1 tablet Oral BID PRN        Or    senna-docusate (SENOKOT-S/PERICOLACE) 8.6-50 MG per tablet 2 tablet  2 tablet Oral BID PRN        sodium chloride (PF) 0.9% PF flush 3 mL  3 mL Intracatheter Q8H   3 mL at 01/27/24 0323    sodium chloride (PF) 0.9% PF flush 3 mL  3 mL Intracatheter q1 min prn   3 mL at 01/28/24 1456    tamsulosin (FLOMAX) capsule 0.4 mg  0.4 mg Oral Daily   0.4 mg at 01/29/24 0814    vancomycin (VANCOCIN) 1,000 mg in 200 mL dextrose intermittent infusion  1,000 mg Intravenous Q12H 200 mL/hr at 01/29/24 0814 1,000 mg at 01/29/24 0814     No current Epic-ordered  "outpatient medications on file.                  Physical Exam:   Vitals were reviewed  Patient Vitals for the past 8 hrs:   BP Temp Temp src Pulse Resp SpO2   01/29/24 0720 124/70 97.8  F (36.6  C) Oral 58 16 97 %     GEN: NAD, lying in bed  EYES: EOMI  MOUTH: MMM  NECK: Supple  RESP: Unlabored breathing  ABD: soft  NEURO: AAO  URO: Left perineal would with bloody serosanguinous drainage.  No eschar or crepitus.  Tender.  Mild induration at the superior portion of the wound.             Data:   No results found for: \"NTBNPI\", \"NTBNP\"  Lab Results   Component Value Date    WBC 9.5 01/27/2024    WBC 13.5 (H) 01/26/2024    WBC 21.7 (H) 01/25/2024    HGB 13.7 01/27/2024    HGB 13.8 01/26/2024    HGB 15.8 01/25/2024    HCT 40.9 01/27/2024    HCT 41.1 01/26/2024    HCT 47.2 01/25/2024    MCV 92 01/27/2024    MCV 92 01/26/2024    MCV 90 01/25/2024     01/27/2024     01/26/2024     01/25/2024     Lab Results   Component Value Date    INR 1.10 01/26/2024      All cultures:  Recent Labs   Lab 01/27/24  0929 01/27/24  0927 01/25/24  2351 01/25/24  2158   CULTURE 4+ Staphylococcus aureus*  No anaerobic organisms isolated after 1 day Culture in progress  1+ Staphylococcus aureus*  1+ Staphylococcus lugdunensis*  No anaerobic organisms isolated after 1 day No growth after 3 days No growth after 3 days      "

## 2024-01-29 NOTE — PROGRESS NOTES
[facility]    Medicine Progress Note - Hospitalist Service  Date of Admission: 1/26/2024     Assessment & Plan         Tee Escudero is a 64 year old male who has a previous history of multiple skin abscesses requiring I&D's, previous left knee MRSA infection requiring surgery, who has a history of renal stones presented to [facility] ER on 1/25/2024 with a groin cellulitis with increased tenderness, redness, heat, and swelling.  He has a history of MRSA in the past.  Had been on Bactrim in the past and he actually tried taking some of his leftover home antibiotics for this.  In the emergency room he was found to have an elevated white blood cell count of 21.7, his CRP was elevated, and his lactic acid was normal.  He also presented with acute renal failure with a creatinine up to 2.17.  He had a CT done showing no abscess or gas formation but does have left-sided renal stones with mild to moderate hydronephrosis along with scrotal skin thickening and edema left greater than right with out an enhancing drainable abscess. There is edema in the subcutaneous fat surrounding the left inguinal canal with lymphadenopathy but no evidence of gas within the soft tissues.  Patient was started on vancomycin, Zosyn, clindamycin at the outside facility and they requested transfer so the patient could be seen by urology and general surgery.              Patient is seen as a direct admission.  Currently denies any fever, chills, sweats.  States that his groin is about the same if not a little bit worse but his pain is stable.  Patient had some drainage out of the area yesterday and he thinks that there is an abscess.  Patient notes thickening of the skin of his left scrotum along with his gluteal cleft on that side.  Patient is making urine.  He has no other new complaints.     Staph aureus perineal cellulitis/abscess, left inguinal lymphadenopathy  Patient presented to [facility] ER with  3 days of increased perineal redness, tenderness, and possible abscess.  Most certainly has a cellulitis.  Concern is that the patient could progress to Dipesh's gangrene.  Patient had a CT scan at the outside facility and did not show any drainable abscess.  He was sent to [facility] to have urology and general surgery involved just in case the patient should not get worse.  I have consulted both services.  Patient's cellulitis currently looks stable.  Will be started on IV fluids.  I will continue him on vancomycin, Zosyn, clindamycin that was started at the outside facility have pharmacy dose his antibiotics.  For pain he will be given Tylenol, oxycodone, and IV Dilaudid for breakthrough.  He has blood cultures pending from the outside facility which are still pending.   1/27/2024: Urology consulted for ureteral stone and while patient was intraoperative performed incision and drainage of cellulitis site, revealing drainage of pustular fluid collection. Wound packed. Cultures obtained and pending.  1/28/2024: Cultures returning with GPC and preliminarily Staph aureus. Zosyn + clindamycin discontinued. Continue vancomycin pending final culture and sensitivities.  1/29/2024: Sensitivities not yet returned. Cultures also with staph. lugdenesis. Will likely require ID consult for antibiotic duration, especially if MRSA. For now, continue vancomycin. Focusing on wound cares, as well.     Acute renal failure, prerenal versus infection versus obstructive uropathy versus ibuprofen induced, improving  Patient presented at the outside facility with a creatinine at 2.17.  This is in the setting of being a little dehydrated, having an active infection but without sepsis, use of ibuprofen, and also was found to have mild to moderate left-sided hydronephrosis on CT scan.  Patient was started on IV fluids and his creatinine has come down to 1.87.  I will continue him on fluids and follow daily chemistries.  Patient did have a  "urinalysis which was not impressive but did show some ketones.  On admission the patient's BUN was also elevated consistent with being prerenal.  1/27/2024: Urology performed removal of stone and stent placement. Continue post-op velasquez per urology.  1/28/2024: Creatinine continuing to downtrend to 1.48. Velasquez now removed by urology.     History of MRSA  Patient needs to be in contact isolation.     Left-sided renal stones with mild to moderate hydronephrosis  Seen on CT scans.  Patient does have a history of kidney stones in the past.  Patient does have acute renal failure as well.  Urology has been consulted.  I will place the patient on Flomax and IV fluids.  Patient's urinalysis did not show any blood.  Diet: Orders Placed This Encounter      Regular Diet Adult     DVT Prophylaxis: Pneumatic Compression Devices  Velasquez: In place  Code Status: Full Code    Expected discharge: pending final cultures and sensitivities    The patient's care was discussed with the Bedside Nurse and Patient.  I personally spent 45 minutes on chart review, documentation, coordination, and bedside management of patient.    Dharmesh Guzman MD, S  Hospitalist Service  [facility]  ______________________________________________________________________    Interval History   Nursing notes reviewed. Patient reports some perineal pain after dressing changes. Pain medications do help PRN. Does have increased pain sitting in bedside chair and while walking.    A full 10+ point review of systems was performed and found to be negative with the exception of those items noted here.    Physical Exam   Temp: 97.7  F (36.5  C) Temp src: Oral BP: 117/74 Pulse: 61   Resp: 16 SpO2: 95 % O2 Device: None (Room air) Oxygen Delivery: 10 LPM Height: 180.3 cm (5' 11\") Weight: 91.6 kg (202 lb)  Estimated body mass index is 28.17 kg/m  as calculated from the following:    Height as of this encounter: 1.803 m (5' 11\").    Weight as of this " encounter: 91.6 kg (202 lb).    General: Very pleasant male resting comfortably in hospital bed.  Awake, alert, interactive. Appears slightly uncomfortable.  HEENT: Normocephalic, atraumatic.  PERRL, EOMI.  Conjunctiva clear, sclerae anicteric.  Mucous membranes moist.  Cardiac: Regular rate and rhythm without murmur, gallop, or rub.  No peripheral edema.  Respiratory: Normal work of breathing.  Clear to auscultation bilaterally without wheezing, rales, or rhonchi.  GI: Normal, active bowel sounds.  Abdomen soft, nontender, nondistended.  : Incision from left gluteal cleft to left inguinal region; packed.   Musculoskeletal: Moving all extremities appropriately.  Skin: No rashes or abrasions on exposed skin.  Neurologic: Alert and oriented x4.  Cranial nerves II through XII grossly intact.  Psychologic: Appropriate mood and affect.    Data   All laboratory results and other diagnostic data from the past 24 hours is available in Epic and has been personally reviewed.    Recent Labs   Lab 01/27/24  1032 01/26/24  1935 01/26/24  0825 01/25/24  2157   WBC 9.5 13.5*  --  21.7*   HGB 13.7 13.8  --  15.8   MCV 92 92  --  90    212  --  231   INR  --  1.10  --   --     136  --  136   POTASSIUM 4.4 4.2  --  4.1   CHLORIDE 106 103  --  100   CO2 22 23  --  20*   BUN 16.5 22.7  --  27.0*   CR 1.48* 1.62* 1.87* 2.17*   ANIONGAP 10 10  --  16*   TEMITOPE 8.6* 8.7*  --  9.4   * 87  --  91   ALBUMIN  --   --   --  4.0   PROTTOTAL  --   --   --  7.4   BILITOTAL  --   --   --  0.9   ALKPHOS  --   --   --  90   ALT  --   --   --  24   AST  --   --   --  20       Imaging results reviewed over the past 24 hrs:   No results found for this or any previous visit (from the past 24 hour(s)).    I personally reviewed: no images or EKG's today.

## 2024-01-29 NOTE — PLAN OF CARE
Goal Outcome Evaluation:    Up ind in room. VSS on room air.  regular diet.  LR @125ml/hr. IV vanco given . Pain 4/10, PRN meds given, see MAR.discharge pending, will continue with plan of care.

## 2024-01-30 LAB — VANCOMYCIN SERPL-MCNC: 13.4 UG/ML

## 2024-01-30 PROCEDURE — 250N000013 HC RX MED GY IP 250 OP 250 PS 637: Performed by: UROLOGY

## 2024-01-30 PROCEDURE — 250N000011 HC RX IP 250 OP 636: Performed by: UROLOGY

## 2024-01-30 PROCEDURE — 250N000011 HC RX IP 250 OP 636

## 2024-01-30 PROCEDURE — 99232 SBSQ HOSP IP/OBS MODERATE 35: CPT | Mod: FS | Performed by: UROLOGY

## 2024-01-30 PROCEDURE — 99254 IP/OBS CNSLTJ NEW/EST MOD 60: CPT | Performed by: INTERNAL MEDICINE

## 2024-01-30 PROCEDURE — 120N000001 HC R&B MED SURG/OB

## 2024-01-30 PROCEDURE — 36415 COLL VENOUS BLD VENIPUNCTURE: CPT | Performed by: HOSPITALIST

## 2024-01-30 PROCEDURE — 99233 SBSQ HOSP IP/OBS HIGH 50: CPT | Performed by: STUDENT IN AN ORGANIZED HEALTH CARE EDUCATION/TRAINING PROGRAM

## 2024-01-30 PROCEDURE — 80202 ASSAY OF VANCOMYCIN: CPT | Performed by: HOSPITALIST

## 2024-01-30 RX ORDER — OXYBUTYNIN CHLORIDE 5 MG/1
5 TABLET ORAL 3 TIMES DAILY PRN
Status: DISCONTINUED | OUTPATIENT
Start: 2024-01-30 | End: 2024-02-01 | Stop reason: HOSPADM

## 2024-01-30 RX ADMIN — HYDROMORPHONE HYDROCHLORIDE 0.4 MG: 0.2 INJECTION, SOLUTION INTRAMUSCULAR; INTRAVENOUS; SUBCUTANEOUS at 14:31

## 2024-01-30 RX ADMIN — VANCOMYCIN HYDROCHLORIDE 1000 MG: 1 INJECTION, SOLUTION INTRAVENOUS at 20:39

## 2024-01-30 RX ADMIN — OXYCODONE HYDROCHLORIDE 5 MG: 5 TABLET ORAL at 23:37

## 2024-01-30 RX ADMIN — OXYCODONE HYDROCHLORIDE 5 MG: 5 TABLET ORAL at 15:17

## 2024-01-30 RX ADMIN — VANCOMYCIN HYDROCHLORIDE 1000 MG: 1 INJECTION, SOLUTION INTRAVENOUS at 08:21

## 2024-01-30 RX ADMIN — TAMSULOSIN HYDROCHLORIDE 0.4 MG: 0.4 CAPSULE ORAL at 08:13

## 2024-01-30 RX ADMIN — OXYCODONE HYDROCHLORIDE 5 MG: 5 TABLET ORAL at 19:52

## 2024-01-30 RX ADMIN — ACETAMINOPHEN 650 MG: 325 TABLET, FILM COATED ORAL at 23:36

## 2024-01-30 RX ADMIN — ACETAMINOPHEN 650 MG: 325 TABLET, FILM COATED ORAL at 08:19

## 2024-01-30 ASSESSMENT — ACTIVITIES OF DAILY LIVING (ADL)
ADLS_ACUITY_SCORE: 20

## 2024-01-30 NOTE — PROGRESS NOTES
[facility]    Medicine Progress Note - Hospitalist Service  Date of Admission: 1/26/2024     Assessment & Plan         Tee Escudero is a 64 year old male who has a previous history of multiple skin abscesses requiring I&D's, previous left knee MRSA infection requiring surgery, who has a history of renal stones presented to [facility] ER on 1/25/2024 with a groin cellulitis with increased tenderness, redness, heat, and swelling.  He has a history of MRSA in the past.  Had been on Bactrim in the past and he actually tried taking some of his leftover home antibiotics for this.  In the emergency room he was found to have an elevated white blood cell count of 21.7, his CRP was elevated, and his lactic acid was normal.  He also presented with acute renal failure with a creatinine up to 2.17.  He had a CT done showing no abscess or gas formation but does have left-sided renal stones with mild to moderate hydronephrosis along with scrotal skin thickening and edema left greater than right with out an enhancing drainable abscess. There is edema in the subcutaneous fat surrounding the left inguinal canal with lymphadenopathy but no evidence of gas within the soft tissues.  Patient was started on vancomycin, Zosyn, clindamycin at the outside facility and they requested transfer so the patient could be seen by urology and general surgery.              Patient is seen as a direct admission.  Currently denies any fever, chills, sweats.  States that his groin is about the same if not a little bit worse but his pain is stable.  Patient had some drainage out of the area yesterday and he thinks that there is an abscess.  Patient notes thickening of the skin of his left scrotum along with his gluteal cleft on that side.  Patient is making urine.  He has no other new complaints.     Staph aureus perineal cellulitis/abscess, left inguinal lymphadenopathy  Patient presented to [facility] ER with  3 days of increased perineal redness, tenderness, and possible abscess.  Most certainly has a cellulitis.  Concern is that the patient could progress to Dipesh's gangrene.  Patient had a CT scan at the outside facility and did not show any drainable abscess.  He was sent to [facility] to have urology and general surgery involved just in case the patient should not get worse.  I have consulted both services.  Patient's cellulitis currently looks stable.  Will be started on IV fluids.  I will continue him on vancomycin, Zosyn, clindamycin that was started at the outside facility have pharmacy dose his antibiotics.  For pain he will be given Tylenol, oxycodone, and IV Dilaudid for breakthrough.  He has blood cultures pending from the outside facility which are still pending.   1/27/2024: Urology consulted for ureteral stone and while patient was intraoperative performed incision and drainage of cellulitis site, revealing drainage of pustular fluid collection. Wound packed. Cultures obtained and pending.  1/28/2024: Cultures returning with GPC and preliminarily Staph aureus. Zosyn + clindamycin discontinued. Continue vancomycin pending final culture and sensitivities.  1/29/2024: Sensitivities not yet returned. Cultures also with staph. lugdenesis. Will likely require ID consult for antibiotic duration, especially if MRSA. For now, continue vancomycin. Focusing on wound cares, as well.  1/30/2024: Cultures returned MRSA. Continue vancomycin. Infectious disease consulted for recommendations regarding ongoing treatment. WOCN following. Will need abx and wound care plans prior to discharge.     Acute renal failure, prerenal versus infection versus obstructive uropathy versus ibuprofen induced, improving  Patient presented at the outside facility with a creatinine at 2.17.  This is in the setting of being a little dehydrated, having an active infection but without sepsis, use of ibuprofen, and also was found to have mild to  moderate left-sided hydronephrosis on CT scan.  Patient was started on IV fluids and his creatinine has come down to 1.87.  I will continue him on fluids and follow daily chemistries.  Patient did have a urinalysis which was not impressive but did show some ketones.  On admission the patient's BUN was also elevated consistent with being prerenal.  1/27/2024: Urology performed removal of stone and stent placement. Continue post-op velasquez per urology.  1/28/2024-1/30/2024: Creatinine continuing to downtrend to 1.48. Velasquez now removed by urology. Will need to follow up with urology outpatient for definitive stone management after discharge.     History of MRSA  Patient needs to be in contact isolation. Given this is second MRSA abscess. Will likely need intermittent MRSA decolonization of nares and rectum. Will need large perineal wound to heal first.     Left-sided renal stones with mild to moderate hydronephrosis  Seen on CT scans.  Patient does have a history of kidney stones in the past.  Patient does have acute renal failure as well.  Urology has been consulted.  I will place the patient on Flomax and IV fluids.  Patient's urinalysis did not show any blood.      Diet: Orders Placed This Encounter      Regular Diet Adult     DVT Prophylaxis: Pneumatic Compression Devices  Velasquez: In place  Code Status: Full Code    Expected discharge: pending final cultures and sensitivities    The patient's care was discussed with the Bedside Nurse and Patient.  I personally spent 45 minutes on chart review, documentation, coordination, and bedside management of patient.    Dharmesh Guzman MD, S  Hospitalist Service  [facility]  ______________________________________________________________________    Interval History   Nursing notes reviewed. Patient feeling well. No new symptoms today.    A full 10+ point review of systems was performed and found to be negative with the exception of those items noted  "here.    Physical Exam   Temp: 97.5  F (36.4  C) Temp src: Oral BP: 135/84 Pulse: 87   Resp: 18 SpO2: 94 % O2 Device: None (Room air) Oxygen Delivery: 10 LPM Height: 180.3 cm (5' 11\") Weight: 91.6 kg (202 lb)  Estimated body mass index is 28.17 kg/m  as calculated from the following:    Height as of this encounter: 1.803 m (5' 11\").    Weight as of this encounter: 91.6 kg (202 lb).    General: Very pleasant male resting comfortably in hospital bed.  Awake, alert, interactive. Appears slightly uncomfortable.  HEENT: Normocephalic, atraumatic.  PERRL, EOMI.  Conjunctiva clear, sclerae anicteric.  Mucous membranes moist.  Cardiac: Regular rate and rhythm without murmur, gallop, or rub.  No peripheral edema.  Respiratory: Normal work of breathing.  Clear to auscultation bilaterally without wheezing, rales, or rhonchi.  Musculoskeletal: Moving all extremities appropriately.  Skin: No rashes or abrasions on exposed skin.  Neurologic: Alert and oriented x4.  Cranial nerves II through XII grossly intact.  Psychologic: Appropriate mood and affect.    Data   All laboratory results and other diagnostic data from the past 24 hours is available in Epic and has been personally reviewed.    Recent Labs   Lab 01/29/24  1516 01/27/24  1032 01/26/24  1935 01/26/24  0825 01/25/24  2157   WBC  --  9.5 13.5*  --  21.7*   HGB  --  13.7 13.8  --  15.8   MCV  --  92 92  --  90   PLT  --  203 212  --  231   INR  --   --  1.10  --   --    NA  --  138 136  --  136   POTASSIUM  --  4.4 4.2  --  4.1   CHLORIDE  --  106 103  --  100   CO2  --  22 23  --  20*   BUN  --  16.5 22.7  --  27.0*   CR 1.39* 1.48* 1.62*   < > 2.17*   ANIONGAP  --  10 10  --  16*   TEMITOPE  --  8.6* 8.7*  --  9.4   GLC  --  103* 87  --  91   ALBUMIN  --   --   --   --  4.0   PROTTOTAL  --   --   --   --  7.4   BILITOTAL  --   --   --   --  0.9   ALKPHOS  --   --   --   --  90   ALT  --   --   --   --  24   AST  --   --   --   --  20    < > = values in this interval not " displayed.       Imaging results reviewed over the past 24 hrs:   No results found for this or any previous visit (from the past 24 hour(s)).    I personally reviewed: no images or EKG's today.

## 2024-01-30 NOTE — PROGRESS NOTES
[facility] Urology Progress Note          Assessment and Plan:     Assessment:     POD 3 Cystoscopy, left retrograde pyelogram, interpretation of fluoroscopic images, left ureteroscopy with thulium laser lithotripsy and stone basketing, left ureteral stent placement.  Incision and drainage of abscess of the left groin and perineum  22 modifier for difficult and lengthy case   Cellulitis of perineum   Left ureteral stone    History of MRSA      Plan:   -Wound cares per WOC.  -Continue with antibiotics.  Follow cultures.   -Pain management per primary service.  -Will need to get ureteral stent removed in 2-4 weeks.  Will try to coordinate this for [place] office with Dr. Martines.    -Possible side effects with an indwelling ureteral stent such as urgency and frequency of urination, dysuria, hematuria, symptoms of urine reflux, and some achiness in the side. Indwelling ureteral stents need to be exchanged every three months or removed by three months.    -Will have Dr. Allen review wound later today given worsening induration and increase in pain in the more superior portion of the surgical site.  -Can start oxybutynin 5 mg 3 times daily as needed for urgency and frequency of urination, likely due to ureteral stent.  -Flomax 0.4 mg once daily.  -Will continue to follow along.    Nessa Nicolas PA-C   [facility]  [phone]               Interval History:     Doing well.  Wound cultures show 1+ MRSA , 1+ Staph lugdunensis, and 4+ Staph aureus.  S On IV vancomycin. Denies vomiting, fevers, chills. Had some nausea with breakfast.  Wound: No eschar or crepitus.  Worsening tenderness and induration at the right superior portion of of the wound.  Granulation tissue noted. Moderate bloody serosanguinous drainage. Endorses urgency and frequency of urination. Afebrile without tachycardia.  On IV Vanco.                    Review of Systems:     The 5 point Review of Systems is negative other than  noted in the HPI             Medications:     Current Facility-Administered Medications Ordered in Epic   Medication Dose Route Frequency Last Rate Last Admin    acetaminophen (TYLENOL) tablet 650 mg  650 mg Oral Q4H PRN   650 mg at 01/30/24 0819    Or    acetaminophen (TYLENOL) Suppository 650 mg  650 mg Rectal Q4H PRN        calcium carbonate (TUMS) chewable tablet 1,000 mg  1,000 mg Oral 4x Daily PRN        HYDROmorphone (DILAUDID) injection 0.2 mg  0.2 mg Intravenous Q2H PRN        HYDROmorphone (DILAUDID) injection 0.4 mg  0.4 mg Intravenous Q2H PRN   0.4 mg at 01/29/24 2304    lidocaine (LMX4) cream   Topical Q1H PRN        lidocaine 1 % 0.1-1 mL  0.1-1 mL Other Q1H PRN        naloxone (NARCAN) injection 0.2 mg  0.2 mg Intravenous Q2 Min PRN        Or    naloxone (NARCAN) injection 0.4 mg  0.4 mg Intravenous Q2 Min PRN        Or    naloxone (NARCAN) injection 0.2 mg  0.2 mg Intramuscular Q2 Min PRN        Or    naloxone (NARCAN) injection 0.4 mg  0.4 mg Intramuscular Q2 Min PRN        ondansetron (ZOFRAN ODT) ODT tab 4 mg  4 mg Oral Q6H PRN        Or    ondansetron (ZOFRAN) injection 4 mg  4 mg Intravenous Q6H PRN        oxyCODONE (ROXICODONE) tablet 5 mg  5 mg Oral Q4H PRN   5 mg at 01/29/24 1634    oxyCODONE IR (ROXICODONE) half-tab 2.5 mg  2.5 mg Oral Q4H PRN   2.5 mg at 01/28/24 0902    polyethylene glycol (MIRALAX) Packet 17 g  17 g Oral BID PRN        senna-docusate (SENOKOT-S/PERICOLACE) 8.6-50 MG per tablet 1 tablet  1 tablet Oral BID PRN        Or    senna-docusate (SENOKOT-S/PERICOLACE) 8.6-50 MG per tablet 2 tablet  2 tablet Oral BID PRN        sodium chloride (PF) 0.9% PF flush 3 mL  3 mL Intracatheter Q8H   3 mL at 01/29/24 2305    sodium chloride (PF) 0.9% PF flush 3 mL  3 mL Intracatheter q1 min prn   3 mL at 01/28/24 1456    tamsulosin (FLOMAX) capsule 0.4 mg  0.4 mg Oral Daily   0.4 mg at 01/30/24 0813    vancomycin (VANCOCIN) 1,000 mg in 200 mL dextrose intermittent infusion  1,000 mg  "Intravenous Q12H 200 mL/hr at 01/30/24 0933 Restarted at 01/30/24 0933     No current [facility]-ordered outpatient medications on file.                  Physical Exam:   Vitals were reviewed  Patient Vitals for the past 8 hrs:   BP Temp Temp src Pulse Resp SpO2   01/30/24 0738 136/55 97.4  F (36.3  C) Oral 64 16 95 %     GEN: NAD   EYES: EOMI  MOUTH: MMM  NECK: Supple  RESP: Unlabored breathing  ABD: soft  NEURO: AAO  URO: Wound: No eschar or crepitus.  Worsening tenderness and induration at the right superior portion of of the wound.  Granulation tissue noted. Moderate bloody serosanguinous drainage.           Data:   No results found for: \"NTBNPI\", \"NTBNP\"  Lab Results   Component Value Date    WBC 9.5 01/27/2024    WBC 13.5 (H) 01/26/2024    WBC 21.7 (H) 01/25/2024    HGB 13.7 01/27/2024    HGB 13.8 01/26/2024    HGB 15.8 01/25/2024    HCT 40.9 01/27/2024    HCT 41.1 01/26/2024    HCT 47.2 01/25/2024    MCV 92 01/27/2024    MCV 92 01/26/2024    MCV 90 01/25/2024     01/27/2024     01/26/2024     01/25/2024     Lab Results   Component Value Date    INR 1.10 01/26/2024      All cultures:  Recent Labs   Lab 01/27/24  0929 01/27/24  0927 01/25/24  2351 01/25/24  2158   CULTURE No anaerobic organisms isolated after 2 days  4+ Staphylococcus aureus* 1+ Staphylococcus aureus MRSA*  1+ Staphylococcus lugdunensis*  No anaerobic organisms isolated after 2 days No growth after 4 days No growth after 4 days      "

## 2024-01-30 NOTE — PLAN OF CARE
To Do:  End of Shift Summary  For vital signs and complete assessments, please see documentation flowsheets.     Pertinent assessments: Pt A/Ox4. VSS. On RA. Wound care completed. Oxy and dilaudid given for pain tolerance. Independent.    Major Shift Events Wound care.    Treatment Plan: Continue IV antibiotics. Pain control. Urology follow up. Discharge TBD.     Bedside Nurse: Carmen Holm RN

## 2024-01-30 NOTE — PLAN OF CARE
Goal Outcome Evaluation:      Plan of Care Reviewed With: patient    Overall Patient Progress: improvingOverall Patient Progress: improving  To Do:  End of Shift Summary  For vital signs and complete assessments, please see documentation flowsheets.     Pertinent assessments:  Assumed cares [phone]. A&Ox4, on RA, VSS, on RA. Afebrile. Independent in the room.  Denies pain and nausea. Regular diet. IV saline locked. Incision, dressing/packing CDI, pt changing as he is going to the bathroom.     Major Shift Events: none    Treatment Plan: manage pain, wound dressing changes daily, IVF, IV abx    Bedside Nurse: Jose Dodson RN

## 2024-01-30 NOTE — PROGRESS NOTES
To Do:  End of Shift Summary  For vital signs and complete assessments, please see documentation flowsheets.     Pertinent assessments: A&Ox4, on RA, VSS, on RA. Afebrile. Independent in the room. Rates pain at 6 oxy x1,  tylenol x1, dilaudid x1 given. Denies nausea. Regular diet. LPIV infiltrated, new IV placed, SL. Vanco given. Incision, dressing/packing CDI, pt changing dressings as he is going to the bathroom. ID lab called this shift, scrotal abscess positive for MRSA. Contact precautions maintained.     Major Shift Events: New IV placed.     Treatment Plan: manage pain, wound dressing changes daily, IVF, IV abx    Bedside Nurse: Rosette Soto RN

## 2024-01-30 NOTE — CONSULTS
[facility]    Infectious Disease Consultation     Date of Admission:  1/26/2024  Date of Consult (When I saw the patient): 01/30/24    Assessment & Plan   Tee Escudero is a 64 year old who was admitted on 1/26/2024.     Impression: 1 64-year-old male with acute inguinal abscess, now drained, growing community-acquired MRSA that is pansensitive except for oxacillin, improved and not particularly septic  2 kidney stone, urologic intervention  3 prior history of multiple skin abscesses including a major leg infection with MRSA in [place], prior to that [place] did several boils drained, has had an occasional boil drained in [place] in [place]  4 acute renal failure    REC 1 1 more day of Vanco renal functions already improving so risk is low, then likely oral antibiotics acceptable I would do doxycycline rather than Septra due to the renal failure likely a 10-day course of 100 twice daily  2 well-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    Primary Care Physician   Physician No Ref-Primary    Chief Complaint   Groin pain    History is obtained from the patient and medical records    History of Present Illness   Tee Escudero is a 64 year old male who presents with new abscess area in his groin, MRSA on culture, also acute kidney stone and renal failure.  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  Since coming to [place] has had a couple of drained abscesses without culture.  He otherwise has no obvious risk factors.   His health is otherwise been generally okay.    Past Medical History   I have reviewed this patient's medical history and updated it with pertinent information if needed.   No past medical history on file.    Past Surgical History   I have reviewed this patient's surgical history and updated it with pertinent information if needed.  Past Surgical History:   Procedure Laterality Date    CYSTOSCOPY, RETROGRADES, COMBINED Left 1/27/2024    Procedure: Cystoscopy, retrogrades, combined;  Surgeon: Buck Allen MD;  Location: [facility] OR    INCISION AND DRAINAGE PERINEAL, COMBINED Left 1/27/2024    Procedure: Incision and drainage perineal, combined;  Surgeon: Buck Allen MD;  Location: [facility] OR    LASER HOLMIUM LITHOTRIPSY URETER(S), INSERT STENT, COMBINED Left 1/27/2024    Procedure: Cystoscopy, left retrograde pyelogram, interpretation of fluoroscopic images, left ureteroscopy with thulium laser lithotripsy and stone basketing, left ureteral stent placement. Incision and drainage of abscess of the left groin and perineum 22 modifier for difficult and lengthy case;  Surgeon: Buck Allen MD;  Location: [facility] OR    SOFT TISSUE SURGERY         Prior to Admission Medications   None     Allergies   No Known Allergies    Immunization History     There is no immunization history on file for this patient.    Social History   I have reviewed this patient's social history and updated it with pertinent information if needed. Tee Escudero      Family History   I have reviewed this patient's family history and updated it with pertinent information if needed.   History reviewed. No pertinent family history.    Review of Systems   The 10 point Review of Systems is negative    Physical Exam   Temp: 97.5  F (36.4  C) Temp src: Oral BP: 135/84 Pulse: 87   Resp: 18 SpO2: 94 % O2 Device: None (Room air)    Vital Signs with Ranges  Temp:  [97.4  F (36.3  C)-98  F (36.7  C)] 97.5  F (36.4  C)  Pulse:  [59-87]  "87  Resp:  [16-18] 18  BP: (135-148)/(55-84) 135/84  SpO2:  [94 %-96 %] 94 %  202 lbs 0 oz  Body mass index is 28.17 kg/m .    GENERAL APPEARANCE:  awake  EYES: Eyes grossly normal to inspection  NECK: no adenopathy  RESP: lungs clear   CV: regular rates and rhythm  LYMPHATICS: normal ant/post cervical and supraclavicular nodes  ABDOMEN: soft, nontender  MS: extremities normal  SKIN: no suspicious lesions or rashes  Groin area is improved no obvious residual abscess cellulitis mostly resolved      Data   All laboratory and imaging data in the past 24 hours reviewed  No results for input(s): \"CULT\" in the last 168 hours.  No lab results found.    Invalid input(s): \"UC\"       All cultures:  Recent Labs   Lab 01/27/24  0929 01/27/24  0927 01/25/24  2351 01/25/24  2158   CULTURE No anaerobic organisms isolated after 3 days  4+ Staphylococcus aureus* No anaerobic organisms isolated after 3 days  1+ Staphylococcus aureus MRSA*  1+ Staphylococcus lugdunensis* No growth after 4 days No growth after 4 days      Blood culture:  Results for orders placed or performed during the hospital encounter of 01/25/24   Blood Culture Arm, Left    Specimen: Arm, Left; Blood   Result Value Ref Range    Culture No growth after 4 days    Blood Culture Arm, Right    Specimen: Arm, Right; Blood   Result Value Ref Range    Culture No growth after 4 days       Urine culture:  No results found for this or any previous visit.          "

## 2024-01-30 NOTE — PHARMACY-VANCOMYCIN DOSING SERVICE
Pharmacy Vancomycin Note  Date of Service 2024  Patient's  1960   64 year old, male    Indication: Skin and Soft Tissue Infection and Abscess  Day of Therapy: 5  Current vancomycin regimen:  1000 mg IV q12h  Current vancomycin monitoring method: AUC  Current vancomycin therapeutic monitoring goal: 400-600 mg*h/L    InsightRX Prediction of Current Vancomycin Regimen  Loading dose: N/A  Regimen: 1000 mg IV every 12 hours.  Exposure target: AUC24 (range)400-600 mg/L.hr   AUC24,ss: 480 mg/L.hr  Probability of AUC24 > 400: 89 %  Ctrough,ss: 14.5 mg/L  Probability of Ctrough,ss > 20: 4 %  Probability of nephrotoxicity (Lodise JAZZ ): 10 %      Current estimated CrCl = Estimated Creatinine Clearance: 63.8 mL/min (A) (based on SCr of 1.39 mg/dL (H)).    Creatinine for last 3 days  2024: 10:32 AM Creatinine 1.48 mg/dL  2024:  3:16 PM Creatinine 1.39 mg/dL    Recent Vancomycin Levels (past 3 days)  2024:  6:02 PM Vancomycin 8.3 ug/mL  2024:  7:34 AM Vancomycin 13.4 ug/mL    Vancomycin IV Administrations (past 72 hours)                     vancomycin (VANCOCIN) 1,000 mg in 200 mL dextrose intermittent infusion (mg) 1,000 mg New Bag 24 0821     1,000 mg New Bag 24 2038     1,000 mg New Bag  0814     1,000 mg New Bag 24 2116    vancomycin (VANCOCIN) 1,250 mg in 0.9% NaCl 250 mL intermittent infusion (mg) 1,250 mg New Bag 24 2154                    Nephrotoxins and other renal medications (From now, onward)      Start     Dose/Rate Route Frequency Ordered Stop    24 2100  vancomycin (VANCOCIN) 1,000 mg in 200 mL dextrose intermittent infusion         1,000 mg  200 mL/hr over 1 Hours Intravenous EVERY 12 HOURS 24 1950                 Contrast Orders - past 72 hours (72h ago, onward)      Start     Dose/Rate Route Frequency Stop    24 0920  iopamidol 61% (ISOVUE 300) 50 mL + sterile water for irrigation 50 mL  Status:  Discontinued           PRN  01/27/24 1056            Interpretation of levels and current regimen:  Vancomycin level is reflective of -600    Has serum creatinine changed greater than 50% in last 72 hours: No    Urine output:  good urine output    Renal Function: Stable      Plan:  Continue Current Dose of Vancomycin IV 1000mg q12h.  Vancomycin monitoring method: AUC  Vancomycin therapeutic monitoring goal: 400-600 mg*h/L  Pharmacy will check vancomycin levels as appropriate in 1-3 Days.  Serum creatinine levels will be ordered daily for the first week of therapy and at least twice weekly for subsequent weeks.    Macrina Gonzalez, PharmD  January 30, 2024

## 2024-01-31 LAB
APPEARANCE STONE: NORMAL
BACTERIA BLD CULT: NO GROWTH
BACTERIA BLD CULT: NO GROWTH
COMPN STONE: NORMAL
CREAT SERPL-MCNC: 1.44 MG/DL (ref 0.67–1.17)
EGFRCR SERPLBLD CKD-EPI 2021: 55 ML/MIN/1.73M2
SPECIMEN WT: 78 MG

## 2024-01-31 PROCEDURE — 99233 SBSQ HOSP IP/OBS HIGH 50: CPT | Performed by: STUDENT IN AN ORGANIZED HEALTH CARE EDUCATION/TRAINING PROGRAM

## 2024-01-31 PROCEDURE — 999N000147 HC STATISTIC PT IP EVAL DEFER

## 2024-01-31 PROCEDURE — 36415 COLL VENOUS BLD VENIPUNCTURE: CPT | Performed by: INTERNAL MEDICINE

## 2024-01-31 PROCEDURE — 250N000013 HC RX MED GY IP 250 OP 250 PS 637: Performed by: INTERNAL MEDICINE

## 2024-01-31 PROCEDURE — 250N000013 HC RX MED GY IP 250 OP 250 PS 637: Performed by: UROLOGY

## 2024-01-31 PROCEDURE — 250N000011 HC RX IP 250 OP 636

## 2024-01-31 PROCEDURE — 120N000001 HC R&B MED SURG/OB

## 2024-01-31 PROCEDURE — 99233 SBSQ HOSP IP/OBS HIGH 50: CPT | Performed by: INTERNAL MEDICINE

## 2024-01-31 PROCEDURE — 250N000011 HC RX IP 250 OP 636: Performed by: UROLOGY

## 2024-01-31 PROCEDURE — 99232 SBSQ HOSP IP/OBS MODERATE 35: CPT | Performed by: PHYSICIAN ASSISTANT

## 2024-01-31 PROCEDURE — 82565 ASSAY OF CREATININE: CPT | Performed by: INTERNAL MEDICINE

## 2024-01-31 RX ORDER — DOXYCYCLINE 100 MG/1
100 CAPSULE ORAL EVERY 12 HOURS SCHEDULED
Status: DISCONTINUED | OUTPATIENT
Start: 2024-01-31 | End: 2024-02-01 | Stop reason: HOSPADM

## 2024-01-31 RX ADMIN — ACETAMINOPHEN 650 MG: 325 TABLET, FILM COATED ORAL at 08:03

## 2024-01-31 RX ADMIN — CEPHALEXIN 500 MG: 250 CAPSULE ORAL at 17:47

## 2024-01-31 RX ADMIN — DOXYCYCLINE HYCLATE 100 MG: 100 CAPSULE ORAL at 19:16

## 2024-01-31 RX ADMIN — OXYCODONE HYDROCHLORIDE 5 MG: 5 TABLET ORAL at 17:40

## 2024-01-31 RX ADMIN — TAMSULOSIN HYDROCHLORIDE 0.4 MG: 0.4 CAPSULE ORAL at 08:03

## 2024-01-31 RX ADMIN — HYDROMORPHONE HYDROCHLORIDE 0.4 MG: 0.2 INJECTION, SOLUTION INTRAMUSCULAR; INTRAVENOUS; SUBCUTANEOUS at 09:17

## 2024-01-31 RX ADMIN — VANCOMYCIN HYDROCHLORIDE 1000 MG: 1 INJECTION, SOLUTION INTRAVENOUS at 08:09

## 2024-01-31 ASSESSMENT — ACTIVITIES OF DAILY LIVING (ADL)
ADLS_ACUITY_SCORE: 20

## 2024-01-31 NOTE — PLAN OF CARE
Assessments:   A/Ox4. VSS. On RA. Oxycodone 5mg given for L groin/perineum incision pain, more with ambulation/activities. Walked in hallways independently.     Treatment Plan: Continue IV antibiotics. Pain control. Urology following     Bedside Nurse: Guanako Garcia RN

## 2024-01-31 NOTE — PLAN OF CARE
Goal Outcome Evaluation:      Plan of Care Reviewed With: patient    Overall Patient Progress: improvingOverall Patient Progress: improving  Assessments: Assumed cares [phone]  A/Ox4. VSS. On RA. C/o pain Oxycodone 5mg and tylenol given for L groin/perineum incision pain, more with ambulation/activities. Voiding without issues, PIV saline locked. Perineum wound incision CDI.     Treatment Plan: Continue IV antibiotics. Pain control. Urology following     Bedside Nurse: Jose Dodson RN

## 2024-01-31 NOTE — PROGRESS NOTES
[facility] Urology Progress Note          Assessment and Plan:     Assessment:     POD 4 Cystoscopy, left retrograde pyelogram, interpretation of fluoroscopic images, left ureteroscopy with thulium laser lithotripsy and stone basketing, left ureteral stent placement.  Incision and drainage of abscess of the left groin and perineum  22 modifier for difficult and lengthy case   Cellulitis of perineum   Left ureteral stone    History of MRSA      Plan:   -Wound cares per WOC.  Would recommend trying to do twice today due to degree of drainage.  Will plan on patient trying to do the dressing change himself with supervision tomorrow morning.  -Continue with antibiotics. Appreciate ID assistance for antibiotic recommendations.  -Pain management per primary service.  -Will need to get ureteral stent removed in 2-4 weeks and wound check.  Will try to coordinate this for [place] office with Dr. Martines.    -Possible side effects with an indwelling ureteral stent such as urgency and frequency of urination, dysuria, hematuria, symptoms of urine reflux, and some achiness in the side. Indwelling ureteral stents need to be exchanged every three months or removed by three months.    -Oxybutynin 5 mg 3 times daily as needed for urgency and frequency of urination, likely due to ureteral stent.  -Flomax 0.4 mg once daily.  -Once wound care outpatient plan finalized, okay to discharge from urological perspective.  Antibiotics per ID.  -Will continue to follow along.    Nessa Nicolas PA-C   [facility] Urology  [phone]               Interval History:     Doing well.  Had a fair amount of pain with last dressing change.  Wound cultures show 1+ MRSA , 1+ Staph lugdunensis, and 4+ Staph aureus.  On IV vancomycin.   Wound: No eschar or crepitus.  Some induration on the superior border of the wound.  Fair amount of bleeding and serosanguineous drainage.  Tender.  Denies N/V/F/C/SOB/CP.  Afebrile without  tachycardia.    I completed the dressing change today.  Good granulation tissue. Tender, but wound look good.              Review of Systems:     The 5 point Review of Systems is negative other than noted in the HPI             Medications:     Current Facility-Administered Medications Ordered in Epic   Medication Dose Route Frequency Last Rate Last Admin    acetaminophen (TYLENOL) tablet 650 mg  650 mg Oral Q4H PRN   650 mg at 01/31/24 0803    Or    acetaminophen (TYLENOL) Suppository 650 mg  650 mg Rectal Q4H PRN        calcium carbonate (TUMS) chewable tablet 1,000 mg  1,000 mg Oral 4x Daily PRN        HYDROmorphone (DILAUDID) injection 0.2 mg  0.2 mg Intravenous Q2H PRN        HYDROmorphone (DILAUDID) injection 0.4 mg  0.4 mg Intravenous Q2H PRN   0.4 mg at 01/31/24 0917    lidocaine (LMX4) cream   Topical Q1H PRN        lidocaine 1 % 0.1-1 mL  0.1-1 mL Other Q1H PRN        naloxone (NARCAN) injection 0.2 mg  0.2 mg Intravenous Q2 Min PRN        Or    naloxone (NARCAN) injection 0.4 mg  0.4 mg Intravenous Q2 Min PRN        Or    naloxone (NARCAN) injection 0.2 mg  0.2 mg Intramuscular Q2 Min PRN        Or    naloxone (NARCAN) injection 0.4 mg  0.4 mg Intramuscular Q2 Min PRN        ondansetron (ZOFRAN ODT) ODT tab 4 mg  4 mg Oral Q6H PRN        Or    ondansetron (ZOFRAN) injection 4 mg  4 mg Intravenous Q6H PRN        oxyBUTYnin (DITROPAN) tablet 5 mg  5 mg Oral TID PRN        oxyCODONE (ROXICODONE) tablet 5 mg  5 mg Oral Q4H PRN   5 mg at 01/30/24 2337    oxyCODONE IR (ROXICODONE) half-tab 2.5 mg  2.5 mg Oral Q4H PRN   2.5 mg at 01/28/24 0902    polyethylene glycol (MIRALAX) Packet 17 g  17 g Oral BID PRN        senna-docusate (SENOKOT-S/PERICOLACE) 8.6-50 MG per tablet 1 tablet  1 tablet Oral BID PRN        Or    senna-docusate (SENOKOT-S/PERICOLACE) 8.6-50 MG per tablet 2 tablet  2 tablet Oral BID PRN        sodium chloride (PF) 0.9% PF flush 3 mL  3 mL Intracatheter Q8H   3 mL at 01/31/24 0808    sodium  "chloride (PF) 0.9% PF flush 3 mL  3 mL Intracatheter q1 min prn   3 mL at 01/28/24 1456    tamsulosin (FLOMAX) capsule 0.4 mg  0.4 mg Oral Daily   0.4 mg at 01/31/24 0803    vancomycin (VANCOCIN) 1,000 mg in 200 mL dextrose intermittent infusion  1,000 mg Intravenous Q12H 200 mL/hr at 01/31/24 0809 1,000 mg at 01/31/24 0809     No current [facility]-ordered outpatient medications on file.                  Physical Exam:   Vitals were reviewed  Patient Vitals for the past 8 hrs:   BP Temp Temp src Pulse Resp SpO2   01/31/24 0757 134/81 97.7  F (36.5  C) Oral 52 16 98 %     GEN: NAD   EYES: EOMI  MOUTH: MMM  NECK: Supple  RESP: Unlabored breathing  ABD: soft  NEURO: AAO  URO: Wound: No eschar or crepitus.  Some induration on the superior border of the wound.  Fair amount of bleeding and serosanguineous drainage.           Data:   No results found for: \"NTBNPI\", \"NTBNP\"  Lab Results   Component Value Date    WBC 9.5 01/27/2024    WBC 13.5 (H) 01/26/2024    WBC 21.7 (H) 01/25/2024    HGB 13.7 01/27/2024    HGB 13.8 01/26/2024    HGB 15.8 01/25/2024    HCT 40.9 01/27/2024    HCT 41.1 01/26/2024    HCT 47.2 01/25/2024    MCV 92 01/27/2024    MCV 92 01/26/2024    MCV 90 01/25/2024     01/27/2024     01/26/2024     01/25/2024     Lab Results   Component Value Date    INR 1.10 01/26/2024      All cultures:  Recent Labs   Lab 01/27/24  0929 01/27/24  0927 01/25/24  2351 01/25/24  2158   CULTURE No anaerobic organisms isolated after 3 days  4+ Staphylococcus aureus* 1+ Staphylococcus aureus MRSA*  1+ Staphylococcus lugdunensis*  No anaerobic organisms isolated after 3 days No Growth No Growth      "

## 2024-01-31 NOTE — PROGRESS NOTES
[facility]    Medicine Progress Note - Hospitalist Service  Date of Admission: 1/26/2024     Assessment & Plan         Tee Escudero is a 64 year old male who has a previous history of multiple skin abscesses requiring I&D's, previous left knee MRSA infection requiring surgery, who has a history of renal stones presented to [facility] ER on 1/25/2024 with a groin cellulitis with increased tenderness, redness, heat, and swelling.  He has a history of MRSA in the past.  Had been on Bactrim in the past and he actually tried taking some of his leftover home antibiotics for this.  In the emergency room he was found to have an elevated white blood cell count of 21.7, his CRP was elevated, and his lactic acid was normal.  He also presented with acute renal failure with a creatinine up to 2.17.  He had a CT done showing no abscess or gas formation but does have left-sided renal stones with mild to moderate hydronephrosis along with scrotal skin thickening and edema left greater than right with out an enhancing drainable abscess. There is edema in the subcutaneous fat surrounding the left inguinal canal with lymphadenopathy but no evidence of gas within the soft tissues.  Patient was started on vancomycin, Zosyn, clindamycin at the outside facility and they requested transfer so the patient could be seen by urology and general surgery.              Patient is seen as a direct admission.  Currently denies any fever, chills, sweats.  States that his groin is about the same if not a little bit worse but his pain is stable.  Patient had some drainage out of the area yesterday and he thinks that there is an abscess.  Patient notes thickening of the skin of his left scrotum along with his gluteal cleft on that side.  Patient is making urine.  He has no other new complaints.     Staph aureus perineal cellulitis/abscess, left inguinal lymphadenopathy  Patient presented to [facility] ER with  3 days of increased perineal redness, tenderness, and possible abscess.  Most certainly has a cellulitis.  Concern is that the patient could progress to Dipesh's gangrene.  Patient had a CT scan at the outside facility and did not show any drainable abscess.  He was sent to [facility] to have urology and general surgery involved just in case the patient should not get worse.  I have consulted both services.  Patient's cellulitis currently looks stable.  Will be started on IV fluids.  I will continue him on vancomycin, Zosyn, clindamycin that was started at the outside facility have pharmacy dose his antibiotics.  For pain he will be given Tylenol, oxycodone, and IV Dilaudid for breakthrough.  He has blood cultures pending from the outside facility which are still pending.   1/27/2024: Urology consulted for ureteral stone and while patient was intraoperative performed incision and drainage of cellulitis site, revealing drainage of pustular fluid collection. Wound packed. Cultures obtained and pending.  1/28/2024: Cultures returning with GPC and preliminarily Staph aureus. Zosyn + clindamycin discontinued. Continue vancomycin pending final culture and sensitivities.  1/29/2024: Sensitivities not yet returned. Cultures also with staph. lugdenesis. Will likely require ID consult for antibiotic duration, especially if MRSA. For now, continue vancomycin. Focusing on wound cares, as well.  1/30/2024: Cultures returned MRSA. Continue vancomycin. Infectious disease consulted for recommendations regarding ongoing treatment. WOCN following. Will need abx and wound care plans prior to discharge.  1/31/2024: Last day of IV vancomycin and can transition to doxycycline tomorrow per ID. Going to attempt dressing changes himself with nursing tonight and tomorrow morning to see if can manage at home. Will also need wound clinic on discharge.     Acute renal failure, prerenal versus infection versus obstructive uropathy versus ibuprofen  induced, improving  Patient presented at the outside facility with a creatinine at 2.17.  This is in the setting of being a little dehydrated, having an active infection but without sepsis, use of ibuprofen, and also was found to have mild to moderate left-sided hydronephrosis on CT scan.  Patient was started on IV fluids and his creatinine has come down to 1.87.  I will continue him on fluids and follow daily chemistries.  Patient did have a urinalysis which was not impressive but did show some ketones.  On admission the patient's BUN was also elevated consistent with being prerenal.  1/27/2024: Urology performed removal of stone and stent placement. Continue post-op velasquez per urology.  1/28/2024-1/31/2024: Creatinine continuing to downtrend. Velasquez now removed by urology. Will need to follow up with urology outpatient for definitive stone management after discharge.     History of MRSA  Patient needs to be in contact isolation. Given this is second MRSA abscess. Will likely need intermittent MRSA decolonization of nares and rectum. Will need large perineal wound to heal first.     Left-sided renal stones with mild to moderate hydronephrosis  Seen on CT scans.  Patient does have a history of kidney stones in the past.  Patient does have acute renal failure as well.  Urology has been consulted.  I will place the patient on Flomax and IV fluids.  Patient's urinalysis did not show any blood.      Diet: Orders Placed This Encounter      Regular Diet Adult     DVT Prophylaxis: Pneumatic Compression Devices  Velasquez: In place  Code Status: Full Code    Expected discharge: pending final cultures and sensitivities    The patient's care was discussed with the Bedside Nurse and Patient.  I personally spent 45 minutes on chart review, documentation, coordination, and bedside management of patient.    Dharmesh Guzman MD, MHS  Hospitalist Service  [facility]  "Hospital  ______________________________________________________________________    Interval History   Nursing notes reviewed. Patient feeling well. Was able to walk down to cafeteria by himself. Stated that sitting hurts worse than walking. Going to attempt dressing changes tonight and tomorrow morning (with nurse).    A full 10+ point review of systems was performed and found to be negative with the exception of those items noted here.    Physical Exam   Temp: 97.7  F (36.5  C) Temp src: Oral BP: 134/81 Pulse: 52   Resp: 16 SpO2: 98 % O2 Device: None (Room air) Oxygen Delivery: 10 LPM Height: 180.3 cm (5' 11\") Weight: 91.6 kg (202 lb)  Estimated body mass index is 28.17 kg/m  as calculated from the following:    Height as of this encounter: 1.803 m (5' 11\").    Weight as of this encounter: 91.6 kg (202 lb).    General: Very pleasant male resting comfortably in bedside chair. Dressed in street clothes.  Awake, alert, interactive. Appears slightly uncomfortable.  HEENT: Normocephalic, atraumatic.  PERRL, EOMI.  Conjunctiva clear, sclerae anicteric.  Mucous membranes moist.  Cardiac: Regular rate and rhythm without murmur, gallop, or rub.  No peripheral edema.  Respiratory: Normal work of breathing.  Clear to auscultation bilaterally without wheezing, rales, or rhonchi.  Musculoskeletal: Moving all extremities appropriately.  Skin: No rashes or abrasions on exposed skin.  Neurologic: Alert and oriented x4.  Cranial nerves II through XII grossly intact.  Psychologic: Appropriate mood and affect.    Data   All laboratory results and other diagnostic data from the past 24 hours is available in Epic and has been personally reviewed.    Recent Labs   Lab 01/31/24  1435 01/29/24  1516 01/27/24  1032 01/26/24  1935 01/26/24  0825 01/25/24  2157   WBC  --   --  9.5 13.5*  --  21.7*   HGB  --   --  13.7 13.8  --  15.8   MCV  --   --  92 92  --  90   PLT  --   --  203 212  --  231   INR  --   --   --  1.10  --   --    NA  --   " --  138 136  --  136   POTASSIUM  --   --  4.4 4.2  --  4.1   CHLORIDE  --   --  106 103  --  100   CO2  --   --  22 23  --  20*   BUN  --   --  16.5 22.7  --  27.0*   CR 1.44* 1.39* 1.48* 1.62*   < > 2.17*   ANIONGAP  --   --  10 10  --  16*   TEMITOPE  --   --  8.6* 8.7*  --  9.4   GLC  --   --  103* 87  --  91   ALBUMIN  --   --   --   --   --  4.0   PROTTOTAL  --   --   --   --   --  7.4   BILITOTAL  --   --   --   --   --  0.9   ALKPHOS  --   --   --   --   --  90   ALT  --   --   --   --   --  24   AST  --   --   --   --   --  20    < > = values in this interval not displayed.       Imaging results reviewed over the past 24 hrs:   No results found for this or any previous visit (from the past 24 hour(s)).    I personally reviewed: no images or EKG's today.

## 2024-01-31 NOTE — PROGRESS NOTES
"[facility]  Infectious Disease Progress Note          Assessment and Plan:   Date of Admission:  1/26/2024  Date of Consult (When I saw the patient): 01/30/24        Assessment & Plan  Tee Escudero is a 64 year old who was admitted on 1/26/2024.      Impression: 1 64-year-old male with acute inguinal abscess, now drained, growing community-acquired MRSA that is pansensitive except for oxacillin, improved and not particularly septic  2 kidney stone, urologic intervention  3 prior history of multiple skin abscesses including a major leg infection with MRSA in [place], prior to that [place] did several boils drained, has had an occasional boil drained in [place] in [place]  4 acute renal failure     REC 1 1 check creatinine given has been on ongoing vancomycin, switch Vanco to oral doxycycline 100 twice daily for 10 days  2 note staph fluid analysis is resistant to the Doxy so we will add Keflex 500 4 times daily for about 5 days  3  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  Feels okay, cultures same the staph ligamentous is resistant to doxycycline, creatinine not repeated since January 29 repeating now              Medications:      cephALEXin  500 mg Oral Q6H ASHUTOSH    doxycycline hyclate  100 mg Oral Q12H ASHUTOSH (08/20)    sodium chloride (PF)  3 mL Intracatheter Q8H    tamsulosin  0.4 mg Oral Daily                  Physical Exam:   Blood pressure 134/81, pulse 52, temperature 97.7  F (36.5  C), temperature source Oral, resp. rate 16, height 1.803 m (5' 11\"), weight 91.6 kg (202 lb), SpO2 98%.  Wt Readings from Last 2 Encounters: " "  01/26/24 91.6 kg (202 lb)   01/25/24 90.7 kg (200 lb)     Vital Signs with Ranges  Temp:  [97.5  F (36.4  C)-98  F (36.7  C)] 97.7  F (36.5  C)  Pulse:  [52-87] 52  Resp:  [16-18] 16  BP: (123-135)/(81-89) 134/81  SpO2:  [94 %-98 %] 98 %    Constitutional: Awake, alert, cooperative, no apparent distress     Lungs: Clear to auscultation bilaterally, no crackles or wheezing   Cardiovascular: Regular rate and rhythm, normal S1 and S2, and no murmur noted   Abdomen: Normal bowel sounds, soft, non-distended, non-tender   Skin: No rashes, no cyanosis, no edema   Other: Wound looks quite clean no major cellulitis no fluctuance          Data:   All microbiology laboratory data reviewed.  Recent Labs   Lab Test 01/27/24  1032 01/26/24  1935 01/25/24  2157   WBC 9.5 13.5* 21.7*   HGB 13.7 13.8 15.8   HCT 40.9 41.1 47.2   MCV 92 92 90    212 231     Recent Labs   Lab Test 01/29/24  1516 01/27/24  1032 01/26/24 1935   CR 1.39* 1.48* 1.62*     No lab results found.  No lab results found.    Invalid input(s): \"UC\"     "

## 2024-01-31 NOTE — PLAN OF CARE
To Do:  End of Shift Summary  For vital signs and complete assessments, please see documentation flowsheets.    Pertinent assessments: Pt is A/Ox4. VSS. On RA. Independent. Pain tolerated with tylenol and dilaudid. Voiding without issues. PIV saline locked.     Major Shift Events: Wound care changed to twice a day. Vanco discontinued. Cephalexin and Doxycycline started.    Treatment Plan: Continue IV antibiotics. Pain control. Pt will do own wound care with supervision in AM. Discharge TBD.    Bedside Nurse: Carmen Holm RN

## 2024-01-31 NOTE — PROGRESS NOTES
PT orders received and reviewed. Per nursing, pt was able to get dressed and walk downstairs independently. No acute mobility or therapy needs identified at this time. Will defer IP PT and complete orders; defer discharge recs to medical team.

## 2024-02-01 VITALS
HEIGHT: 71 IN | WEIGHT: 202 LBS | TEMPERATURE: 97.6 F | DIASTOLIC BLOOD PRESSURE: 62 MMHG | BODY MASS INDEX: 28.28 KG/M2 | OXYGEN SATURATION: 98 % | RESPIRATION RATE: 16 BRPM | HEART RATE: 63 BPM | SYSTOLIC BLOOD PRESSURE: 118 MMHG

## 2024-02-01 PROCEDURE — 250N000013 HC RX MED GY IP 250 OP 250 PS 637: Performed by: INTERNAL MEDICINE

## 2024-02-01 PROCEDURE — 250N000013 HC RX MED GY IP 250 OP 250 PS 637: Performed by: UROLOGY

## 2024-02-01 PROCEDURE — 99232 SBSQ HOSP IP/OBS MODERATE 35: CPT | Performed by: INTERNAL MEDICINE

## 2024-02-01 PROCEDURE — 99232 SBSQ HOSP IP/OBS MODERATE 35: CPT | Performed by: PHYSICIAN ASSISTANT

## 2024-02-01 RX ORDER — SODIUM CHLOR/HYPOCHLOROUS ACID 0.033 %
50 SOLUTION, IRRIGATION IRRIGATION 2 TIMES DAILY
Qty: 475 ML | Refills: 3 | Status: SHIPPED | OUTPATIENT
Start: 2024-02-01 | End: 2024-05-23

## 2024-02-01 RX ORDER — TAMSULOSIN HYDROCHLORIDE 0.4 MG/1
0.4 CAPSULE ORAL DAILY
Qty: 30 CAPSULE | Refills: 0 | Status: SHIPPED | OUTPATIENT
Start: 2024-02-02 | End: 2024-05-23

## 2024-02-01 RX ORDER — CEPHALEXIN 500 MG/1
500 CAPSULE ORAL EVERY 6 HOURS
Qty: 20 CAPSULE | Refills: 0 | Status: SHIPPED | OUTPATIENT
Start: 2024-02-01 | End: 2024-02-06

## 2024-02-01 RX ORDER — DOXYCYCLINE 100 MG/1
100 CAPSULE ORAL EVERY 12 HOURS
Qty: 19 CAPSULE | Refills: 0 | Status: SHIPPED | OUTPATIENT
Start: 2024-02-01 | End: 2024-02-11

## 2024-02-01 RX ORDER — OXYCODONE HYDROCHLORIDE 5 MG/1
5-10 TABLET ORAL EVERY 6 HOURS PRN
Qty: 15 TABLET | Refills: 0 | Status: SHIPPED | OUTPATIENT
Start: 2024-02-01 | End: 2024-05-23

## 2024-02-01 RX ORDER — MUPIROCIN 20 MG/G
OINTMENT TOPICAL 2 TIMES DAILY
Qty: 15 G | Refills: 0 | Status: SHIPPED | OUTPATIENT
Start: 2024-03-01 | End: 2024-03-08

## 2024-02-01 RX ORDER — OXYBUTYNIN CHLORIDE 5 MG/1
5 TABLET ORAL 3 TIMES DAILY PRN
Qty: 30 TABLET | Refills: 0 | Status: SHIPPED | OUTPATIENT
Start: 2024-02-01 | End: 2024-05-23

## 2024-02-01 RX ORDER — GAUZE BANDAGE 1" X 5YARD
1 BANDAGE TOPICAL 2 TIMES DAILY
Qty: 24 EACH | Refills: 1 | Status: SHIPPED | OUTPATIENT
Start: 2024-02-01

## 2024-02-01 RX ADMIN — DOXYCYCLINE HYCLATE 100 MG: 100 CAPSULE ORAL at 09:23

## 2024-02-01 RX ADMIN — TAMSULOSIN HYDROCHLORIDE 0.4 MG: 0.4 CAPSULE ORAL at 09:23

## 2024-02-01 RX ADMIN — CEPHALEXIN 500 MG: 250 CAPSULE ORAL at 06:06

## 2024-02-01 RX ADMIN — CEPHALEXIN 500 MG: 250 CAPSULE ORAL at 12:08

## 2024-02-01 RX ADMIN — OXYCODONE HYDROCHLORIDE 5 MG: 5 TABLET ORAL at 09:23

## 2024-02-01 RX ADMIN — CEPHALEXIN 500 MG: 250 CAPSULE ORAL at 01:06

## 2024-02-01 ASSESSMENT — ACTIVITIES OF DAILY LIVING (ADL)
ADLS_ACUITY_SCORE: 20

## 2024-02-01 NOTE — PROGRESS NOTES
[facility] Urology Progress Note          Assessment and Plan:     Assessment:     POD 5 Cystoscopy, left retrograde pyelogram, interpretation of fluoroscopic images, left ureteroscopy with thulium laser lithotripsy and stone basketing, left ureteral stent placement.  Incision and drainage of abscess of the left groin and perineum  22 modifier for difficult and lengthy case   Cellulitis of perineum   Left ureteral stone    History of MRSA      Plan:   -Wound cares per WOC.  Patient being set up for outpatient wound clinic.  He demonstrated that he could do his own dressing changes with the aid of a mirror twice.  -Continue with antibiotics. Appreciate ID assistance for antibiotic recommendations.  -Pain management per primary service.  -Will need to get ureteral stent removed in 2-4 weeks and wound check.  Will coordinate this for [place] office with Dr. Martines.  We will contact him after discharge to arrange this.  -Possible side effects with an indwelling ureteral stent such as urgency and frequency of urination, dysuria, hematuria, symptoms of urine reflux, and some achiness in the side. Indwelling ureteral stents need to be exchanged every three months or removed by three months.  I stressed to the patient that indwelling ureteral stent cannot remain in place permanently.  -Oxybutynin 5 mg 3 times daily as needed for urgency and frequency of urination, likely due to ureteral stent.  -Flomax 0.4 mg once daily.  -Okay to discharge from urology perspective with Flomax, oxybutynin, antibiotics, and pain medication for dressing changes.  Will plan on signing off.  Please contact us with any additional urological concerns.    Nessa Nicolas PA-C   [facility]  [phone]               Interval History:     Doing well, but had some pain when doing his dressing change.  Patient was able to complete his dressing change successfully twice with observation.  Wound cultures show 1+ MRSA , 1+ Staph  lugdunensis, and 4+ Staph aureus.   on doxycycline and Keflex per ID.  Afebrile without tachycardia.  Creatinine 1.44 EGFR 55. Denies N/V/F/C.              Review of Systems:     The 5 point Review of Systems is negative other than noted in the HPI             Medications:     Current Facility-Administered Medications Ordered in Epic   Medication Dose Route Frequency Last Rate Last Admin    acetaminophen (TYLENOL) tablet 650 mg  650 mg Oral Q4H PRN   650 mg at 01/31/24 0803    Or    acetaminophen (TYLENOL) Suppository 650 mg  650 mg Rectal Q4H PRN        calcium carbonate (TUMS) chewable tablet 1,000 mg  1,000 mg Oral 4x Daily PRN        cephALEXin (KEFLEX) capsule 500 mg  500 mg Oral Q6H ASHUTOSH   500 mg at 02/01/24 0606    doxycycline hyclate (VIBRAMYCIN) capsule 100 mg  100 mg Oral Q12H ASHUTOSH (08/20)   100 mg at 02/01/24 0923    HYDROmorphone (DILAUDID) injection 0.2 mg  0.2 mg Intravenous Q2H PRN        HYDROmorphone (DILAUDID) injection 0.4 mg  0.4 mg Intravenous Q2H PRN   0.4 mg at 01/31/24 0917    lidocaine (LMX4) cream   Topical Q1H PRN        lidocaine 1 % 0.1-1 mL  0.1-1 mL Other Q1H PRN        naloxone (NARCAN) injection 0.2 mg  0.2 mg Intravenous Q2 Min PRN        Or    naloxone (NARCAN) injection 0.4 mg  0.4 mg Intravenous Q2 Min PRN        Or    naloxone (NARCAN) injection 0.2 mg  0.2 mg Intramuscular Q2 Min PRN        Or    naloxone (NARCAN) injection 0.4 mg  0.4 mg Intramuscular Q2 Min PRN        ondansetron (ZOFRAN ODT) ODT tab 4 mg  4 mg Oral Q6H PRN        Or    ondansetron (ZOFRAN) injection 4 mg  4 mg Intravenous Q6H PRN        oxyBUTYnin (DITROPAN) tablet 5 mg  5 mg Oral TID PRN        oxyCODONE (ROXICODONE) tablet 5 mg  5 mg Oral Q4H PRN   5 mg at 02/01/24 0923    oxyCODONE IR (ROXICODONE) half-tab 2.5 mg  2.5 mg Oral Q4H PRN   2.5 mg at 01/28/24 0902    polyethylene glycol (MIRALAX) Packet 17 g  17 g Oral BID PRN        senna-docusate (SENOKOT-S/PERICOLACE) 8.6-50 MG per tablet 1 tablet  1 tablet  "Oral BID PRN        Or    senna-docusate (SENOKOT-S/PERICOLACE) 8.6-50 MG per tablet 2 tablet  2 tablet Oral BID PRN        sodium chloride (PF) 0.9% PF flush 3 mL  3 mL Intracatheter Q8H   3 mL at 02/01/24 0607    sodium chloride (PF) 0.9% PF flush 3 mL  3 mL Intracatheter q1 min prn   3 mL at 01/28/24 1456    tamsulosin (FLOMAX) capsule 0.4 mg  0.4 mg Oral Daily   0.4 mg at 02/01/24 0923     Current Outpatient Medications Ordered in Epic   Medication    cephALEXin (KEFLEX) 500 MG capsule    chlorhexidine (HIBICLENS) 4 % liquid    doxycycline hyclate (VIBRAMYCIN) 100 MG capsule    Elastic Bandages & Supports (CURITY STRETCH BANDAGE) MISC    [START ON 3/1/2024] mupirocin (BACTROBAN) 2 % external ointment    oxyBUTYnin (DITROPAN) 5 MG tablet    oxyCODONE (ROXICODONE) 5 MG tablet    [START ON 2/2/2024] tamsulosin (FLOMAX) 0.4 MG capsule    Wound Cleansers (VASHE WOUND THERAPY) SOLN                  Physical Exam:   Vitals were reviewed  Patient Vitals for the past 8 hrs:   BP Temp Temp src Pulse Resp SpO2   02/01/24 0712 118/62 97.6  F (36.4  C) Oral 63 16 98 %     GEN: NAD   EYES: EOMI  MOUTH: MMM  NECK: Supple  NEURO: AAO           Data:   No results found for: \"NTBNPI\", \"NTBNP\"  Lab Results   Component Value Date    WBC 9.5 01/27/2024    WBC 13.5 (H) 01/26/2024    WBC 21.7 (H) 01/25/2024    HGB 13.7 01/27/2024    HGB 13.8 01/26/2024    HGB 15.8 01/25/2024    HCT 40.9 01/27/2024    HCT 41.1 01/26/2024    HCT 47.2 01/25/2024    MCV 92 01/27/2024    MCV 92 01/26/2024    MCV 90 01/25/2024     01/27/2024     01/26/2024     01/25/2024     Lab Results   Component Value Date    INR 1.10 01/26/2024      All cultures:  Recent Labs   Lab 01/27/24  0929 01/27/24  0927 01/25/24  2351 01/25/24  2158   CULTURE No anaerobic organisms isolated after 4 days  4+ Staphylococcus aureus* 1+ Staphylococcus aureus MRSA*  1+ Staphylococcus lugdunensis*  No anaerobic organisms isolated after 4 days No Growth No " Growth

## 2024-02-01 NOTE — PLAN OF CARE
Assessments:   A/Ox4. VSS. On RA. Independent., ambulated in hallways. No SoB, n/v. Premedicated with oxycodone prior to dressing change of L groin/perineum. Voiding without issues.     Major Shift Events: Patient was able to change his wound dressing by himself with a mirror.     Treatment Plan: Continue oral antibiotics. Pain control. Wound cares, Discharge TBD.    Bedside Nurse: Guanako Garcia RN

## 2024-02-01 NOTE — PROGRESS NOTES
"[facility]  Infectious Disease Progress Note          Assessment and Plan:   Date of Admission:  1/26/2024  Date of Consult (When I saw the patient): 01/30/24        Assessment & Plan  Tee Escudero is a 64 year old who was admitted on 1/26/2024.      Impression: 1 64-year-old male with acute inguinal abscess, now drained, growing community-acquired MRSA that is pansensitive except for oxacillin, improved and not particularly septic  2 kidney stone, urologic intervention  3 prior history of multiple skin abscesses including a major leg infection with MRSA in [place], prior to that [place] did several boils drained, has had an occasional boil drained in [place] in [place]  4 acute renal failure     REC 1 Continue doxycycline 100 twice daily for 10 days  2 note staph fluid analysis is resistant to the Doxy so we will add Keflex 500 4 times daily for about 5 days  3  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  Feels okay, cultures same the staph ligamentous is resistant to doxycycline, creatinine not repeated since January 29 repeating 1.44              Medications:      cephALEXin  500 mg Oral Q6H ASHUTOSH    doxycycline hyclate  100 mg Oral Q12H ASHUTOSH (08/20)    sodium chloride (PF)  3 mL Intracatheter Q8H    tamsulosin  0.4 mg Oral Daily                  Physical Exam:   Blood pressure 118/62, pulse 63, temperature 97.6  F (36.4  C), temperature source Oral, resp. rate 16, height 1.803 m (5' 11\"), weight 91.6 kg (202 lb), SpO2 98%.  Wt Readings from Last 2 Encounters: " "  01/26/24 91.6 kg (202 lb)   01/25/24 90.7 kg (200 lb)     Vital Signs with Ranges  Temp:  [97.6  F (36.4  C)-97.7  F (36.5  C)] 97.6  F (36.4  C)  Pulse:  [63-68] 63  Resp:  [16] 16  BP: (108-131)/(54-74) 118/62  SpO2:  [98 %] 98 %    Constitutional: Awake, alert, cooperative, no apparent distress     Lungs: Clear to auscultation bilaterally, no crackles or wheezing   Cardiovascular: Regular rate and rhythm, normal S1 and S2, and no murmur noted   Abdomen: Normal bowel sounds, soft, non-distended, non-tender   Skin: No rashes, no cyanosis, no edema   Other: Wound looks quite clean no major cellulitis no fluctuance          Data:   All microbiology laboratory data reviewed.  Recent Labs   Lab Test 01/27/24  1032 01/26/24  1935 01/25/24  2157   WBC 9.5 13.5* 21.7*   HGB 13.7 13.8 15.8   HCT 40.9 41.1 47.2   MCV 92 92 90    212 231     Recent Labs   Lab Test 01/31/24  1435 01/29/24  1516 01/27/24  1032   CR 1.44* 1.39* 1.48*     No lab results found.  No lab results found.    Invalid input(s): \"UC\"     "

## 2024-02-01 NOTE — PLAN OF CARE
"Assessments:   A/Ox4. VSS. On RA. Independent., ambulated in hallways. No sob,chest pain. Change the dressing x1 overnight. Voiding without issues. Saline lock.    Major Shift Events: Patient was able to change his wound dressing by himself.     Treatment Plan: Continue oral antibiotics. Pain control. Wound cares,   Bedside Nurse: Kiara Cisneros RN    Vital signs:  Temp: 97.7  F (36.5  C) Temp src: Oral BP: 131/74 Pulse: 67   Resp: 16 SpO2: 98 % O2 Device: None (Room air) Oxygen Delivery: 10 LPM Height: 180.3 cm (5' 11\") Weight: 91.6 kg (202 lb)  Estimated body mass index is 28.17 kg/m  as calculated from the following:    Height as of this encounter: 1.803 m (5' 11\").    Weight as of this encounter: 91.6 kg (202 lb).      "

## 2024-02-01 NOTE — PROGRESS NOTES
Care Management Discharge Note    Discharge Date: 02/01/2024       Discharge Disposition:  home    Discharge Services:  WOC RN     Discharge DME:      Discharge Transportation:  friend    Private pay costs discussed:      Does the patient's insurance plan have a 3 day qualifying hospital stay waiver?  No    Education Provided on the Discharge Plan:  no  Persons Notified of Discharge Plans: aware  Patient/Family in Agreement with the Plan:  yes    Handoff Referral Completed: No    Additional Information:  Patient discharging to home today via friend transportation.  He has been able to do his dressing changes himself x 2. CM set up 2 appointment s with WOC RN at the Wound and Ostomy clinic in the [facility] in [facility].  First is Monday, 2/5 at 11 Am and second is Thursday, 2/8, at 10 AM.  If patient needs to be seen by an MD, Dr Zach Edwards. Patient informed and to go to upper level of [facility]. Patient verbalized understanding.    Carrie Mata RN, BSN, CM  Inpatient Care Coordination  [facility]  [phone]    Madelyn Mata RN

## 2024-02-01 NOTE — PLAN OF CARE
Discharge Note    Patient discharged to home via private vehicle  accompanied by friend.  IV: Discontinued  Prescriptions filled and given to patient/family.   Belongings reviewed and sent with patient.   Home medications returned to patient: NA  Equipment sent with: patient.   patient verbalizes understanding of discharge instructions. AVS given to patient.  Additional education completed? Wound Care

## 2024-02-02 LAB
BACTERIA ABSC ANAEROBE+AEROBE CULT: ABNORMAL
BACTERIA ABSC ANAEROBE+AEROBE CULT: ABNORMAL

## 2024-02-03 LAB
BACTERIA ABSC ANAEROBE+AEROBE CULT: NORMAL
BACTERIA ABSC ANAEROBE+AEROBE CULT: NORMAL

## 2024-02-04 ENCOUNTER — PATIENT OUTREACH (OUTPATIENT)
Dept: CARE COORDINATION | Facility: CLINIC | Age: 64
End: 2024-02-04

## 2024-02-04 NOTE — PROGRESS NOTES
Connected Care Resource Center:   [facility] Resource Center Contact  [facility]/Voicemail     Clinical Data: Post-Discharge Outreach     Outreach attempted x 2.  Left message on patient's voicemail, providing [facility]'s central phone number of [phone] ([phone]) for questions/concerns and/or to schedule an appt with an [facility] provider, if they do not have a PCP.      Plan:  [facility] will do no further outreaches at this time.       Belle Hanna MA  Connected Care Resource Center, [facility]    *Connected Care Resource Team does NOT follow patient ongoing. Referrals are identified based on internal discharge reports and the outreach is to ensure patient has an understanding of their discharge instructions.

## 2024-02-05 ENCOUNTER — HOSPITAL ENCOUNTER (OUTPATIENT)
Dept: WOUND CARE | Facility: CLINIC | Age: 64
Discharge: HOME | End: 2024-02-05
Attending: UROLOGY | Admitting: UROLOGY

## 2024-02-05 DIAGNOSIS — N20.1 URETERAL STONE: ICD-10-CM

## 2024-02-05 DIAGNOSIS — S31.109A WOUND OF LEFT GROIN, INITIAL ENCOUNTER: ICD-10-CM

## 2024-02-05 DIAGNOSIS — L03.315 CELLULITIS OF PERINEUM: ICD-10-CM

## 2024-02-05 PROCEDURE — G0463 HOSPITAL OUTPT CLINIC VISIT: HCPCS

## 2024-02-05 NOTE — PROGRESS NOTES
[facility]    Start of Care in [facility] Wound Clinic: 2/5/2024   Referring Provider: Dr Dharmesh Guzman MD  Primary Care Provider: No Ref-Primary, Physician   Wound Location: Left groin fold.      Wound Clinic Visit: Initial visit today 2/5/24.    Reason for Visit: Assess and treat left groin fold wound.     Subjective:  On arrival to day 2/5/24 alone, for his initial visit to the Wound and Ostomy clinic here at [facility], he adds to the wound history below.      Wound History:   Pt who was hospitalized at [facility] after being transferred from the [facility] [place] [facility] ED on 1/25/24, discharged home 2/1/24.  Care coordinator at [facility] contacted the Wound and Ostomy clinic here at [facility] to set up follow up wound clinic appointments closer to the pt's home in [place].  Pt has a significant past medical history of multiple skin abscesses requiring I&D's, previous left knee MRSA infection requiring surgery, history of renal stones.  He presented to the [facility] ED 1/25/2024, ED report - left groin cellulitis with increased tenderness, redness, heat, and swelling, he had been on Bactrim in the past and he actually tried taking some of his leftover home antibiotics for this.  In ED elevated white blood cell count of 21.7, CRP elevated, lactic acid was normal.  Was in acute renal failure, creatinine up to 2.17.  CT showed no abscess or gas formation and no evidence of Dipesh's gangrene, was positive for left-sided renal stones with mild to moderate hydronephrosis along with scrotal skin thickening and edema left greater than right with out an enhancing drainable abscess. Edema in the subcutaneous fat surrounding the left inguinal canal with lymphadenopathy but no evidence of gas within the soft tissues.  Started on vancomycin, Zosyn, clindamycin, transferred to [facility] as a direct admission.    CT scan incidentally discovered a large distal left  "ureteral stone, asymptomatic.  Per urology - Atrophic parenchyma of the left kidney.  Hydronephrosis down to the distal ureter where there is a large stone in the distal ureter.  There is soft tissue swelling of the left hemiscrotum, the left groin, and left perineum.  Urology 1/27/24 performed - Cystoscopy, left retrograde pyelogram, interpretation of fluoroscopic images, left ureteroscopy with thulium laser lithotripsy and stone basketing, left ureteral stent placement. Incision and drainage of abscess of the left groin and perineum.  And Incision and drainage perineal, combined.  Cystoscopy, retrogrades, combined.  Pt was discharged home with plans for daily self cares to the left groin wound as listed in Current Treatment Plan below.  He is very active with work but is trying to limit lifting and overexertion.      Past Medical History:  Patient Active Problem List   Diagnosis    Cellulitis of perineum               Tobacco Use:     Tobacco Use      Smoking status: Not on file      Smokeless tobacco: Not on file     Diabetic: No  HgbA1C: No results found for: \"A1C\"  Checks Blood Glucose?:  NA Average Readings: NA      Personal/social history:  Pt lives independently in the [facility], no current home care services in place.    Objective:   Current treatment plan: Left groin fold surgical wound: Washing with saline or a no rinse dermal spray cleanser, drying well.  Filling wound bed with Vashe dampened gauze, covering with two ABD pads secured with adult diaper and stretch undergarments.  Last changed: yesterday    Wound #1 Left groin, I&D site, DOS 1/27/24.  Stage/tissue depth: full thickness  15 cm L x 2 cm W x 2 cm D  Tunneling: none noted  Undermining: none noted  Wound bed type/amount: approximately 50 % granular tissue and 50 % red nongranular tissue; Not fluctuant  Wound Edges: open  Periwound: wnl, scattered mild areas of induration  Drainage: small to moderate amounts serosanguinous with scant " amounts sanguinous weeping with dressing changes,  Odor: no  Pain: yes 6/10 at some sites during cares, not pt did not premedicate today for pain as is driving.    Photo from today's initial visit to the Wound and Ostomy clinic [facility] 2/5/24.     Dorsalis Pedal Pulse: palpable: Not assessed this visit.  Posterior Tibial Pulse: palpable: Not assessed this visit.  Hair growth: Not assessed this visit.  Capillary Refill: Not assessed this visit.  Feet/toes color: Not assessed this visit.  Nails: Not assessed this visit.  R Leg: Edema Not assessed this visit. Ankle circumference NA cm. Calf circumference NA cm.  L Leg: Edema Not assessed this visit. Ankle circumference NA cm. Calf circumference NA cm.    Mobility: slightly limited due to left groin fold  Current offloading/footwear: regular sneakers  Sensation: Not assessed this visit.    Other callusing/areas of concern: none noted    Diet: Regular    Assessment:  Today the wound dressing to the left groin is in place.  Packing well applied, ABD pads with small amounts serosanguinous drainage to the inner pad only, none to the outer of two in place.  The packing gauze was damp with scant areas dry needing some moistening to remove without pain.  The wound was cleansed with saline and dried well with gauze.  The superior aspect of the incision is painful with any touch but no signs of infection noted anywhere.  The wound bed is clean with equal mix of granular and red nongranular tissue present.  Greatest depth is near the upper 1/4 of the incision, potential for some pocketing to the 12 o'clock but none currently.  No signs of periwound or wound infection currently.    Pt reported today he is still very active and reports doing some boweling since discharge, did initially use an assistive device for boweling but then switched to gently rolling by hand.  WE discussed today need to not lift more than a gallon of mild in weight while his has the open  inguinal groin wound.    Pt also reports his appetite is fair but does not consume a lot of protein other than in hamburger when he cooks, eats more fruits and vegetables, no significant dairy intake, does not like yogurt, no significant legume intake, no significant peanut butter intake.  Instructed him to check the labels on the foods he eats regularly to see what the protein grams are listed and to aim for high protein diet for healing and remodeling.    Factors impacting wound healing:   Poor nutrition: inadequate supply of protein, carbohydrates, fatty acids, and trace elements essential for all phases of wound healing, teaching started at initial visit on need for increased protein in diet for healing, pt has fair appetite and does not normally eat large amounts of protein other than in hamburger  Delayed healing as part of normal aging process  Reduced Blood Supply: inadequate perfusion to heal wound, appears adequate  Medication: NA  Chemotherapy: suppresses the immune system and inflammatory response, NA  Radiotherapy: increases production of free radical which damage cells, NA  Psychological stress: none noted  Obesity: decreases tissue perfusion  Infection: prolongs inflammatory phase, uses vital nutrients, impairs epithelialization and releases toxins, pt is on oral antibiotics and is using an antimicrobial primary dressing for wound cares.  Underlying Disease: history of multiple MRSA skin and wound sites in the past  Maceration: reduces wound tensile strength and inhibits epithelial migration, none noted  Patient compliance, appears motivated to heal  Unrelieved pressure, NA  Immobility, NA  Substance abuse: NA    Plan:  Pt will continue with the daily wound cares as listed above in plan.  Today since we do not stock Vashe and pt did not bring his bottle with, we used saline damp gauze to fill wound bed.  Pt is willing to purchases more Vashe on line as needed.  Pt will return on Thursday this week to  clinic for reevaluation and we will determine frequency of ongoing follow up visits at that time    Discussed/Education provided: plan of care with rationale;     Topical care: Wound/surrounding skin cleansed with saline and gauze. Patted dry. Wound bed filled with ns damp sterile woven gauze, covered with two ABD pads secured with pt's adult diaper and medical stretch underwear.    Pt is able to perform cares/has been caring for wound.  Has mirrors and lighting at home to do himself.     Additional recommendations: None at this time    The following discharge instructions were reviewed with and sent home with the patient:  Declined AVS, took clinic business card with upcoming appointment written    The following supplies were sent home with the patient:  Few extra ABD pads    Return visit: 2/8/24    Verbal, written, & demonstrative education provided.  Face to face time: approximately 30 minutes  Procedure: MOHINDER Dobbins RN, CWOCN  [phone]

## 2024-02-08 ENCOUNTER — HOSPITAL ENCOUNTER (OUTPATIENT)
Dept: WOUND CARE | Facility: CLINIC | Age: 64
Discharge: HOME | End: 2024-02-08
Attending: HOSPITALIST | Admitting: HOSPITALIST

## 2024-02-08 PROCEDURE — G0463 HOSPITAL OUTPT CLINIC VISIT: HCPCS

## 2024-02-08 NOTE — PROGRESS NOTES
[facility]    Start of Care in [facility]ound Clinic: 2/5/2024   Referring Provider: Dr Dharmesh Guzman MD  Primary Care Provider: No Ref-Primary, Physician   Wound Location: Left groin fold.      Wound Clinic Visit:  Scheduled follow up.     Reason for Visit: Assess and treat left groin fold wound.     Subjective:  On arrival today 2/8/24 alone for his second scheduled visit to the Wound and Ostomy clinic, reports the following: No concerns with the left groin wound itself, some wound edge itching, pain well controlled without use of narcotics.  Had large amounts of drainage periodically over the past few days but no other concerns with dressings, packing is staying in place most of the time, is remaining damp between changes.        Wound History:   Pt who was hospitalized at [facility] after being transferred from the [facility] ED on 1/25/24, discharged home 2/1/24.  Care coordinator at [facility] contacted the Wound and Ostomy clinic here at [facility] to set up follow up wound clinic appointments closer to the pt's home in [place].  Pt has a significant past medical history of multiple skin abscesses requiring I&D's, previous left knee MRSA infection requiring surgery, history of renal stones.  He presented to the [facility] ED 1/25/2024, ED report - left groin cellulitis with increased tenderness, redness, heat, and swelling, he had been on Bactrim in the past and he actually tried taking some of his leftover home antibiotics for this.  In ED elevated white blood cell count of 21.7, CRP elevated, lactic acid was normal.  Was in acute renal failure, creatinine up to 2.17.  CT showed no abscess or gas formation and no evidence of Dipesh's gangrene, was positive for left-sided renal stones with mild to moderate hydronephrosis along with scrotal skin thickening and edema left greater than right with out an enhancing drainable abscess. Edema in the  "subcutaneous fat surrounding the left inguinal canal with lymphadenopathy but no evidence of gas within the soft tissues.  Started on vancomycin, Zosyn, clindamycin, transferred to [facility] as a direct admission.    CT scan incidentally discovered a large distal left ureteral stone, asymptomatic.  Per urology - Atrophic parenchyma of the left kidney.  Hydronephrosis down to the distal ureter where there is a large stone in the distal ureter.  There is soft tissue swelling of the left hemiscrotum, the left groin, and left perineum.  Urology 1/27/24 performed - Cystoscopy, left retrograde pyelogram, interpretation of fluoroscopic images, left ureteroscopy with thulium laser lithotripsy and stone basketing, left ureteral stent placement (scheduled for removal o/p 2/14/24). Incision and drainage of abscess of the left groin and perineum.  And Incision and drainage perineal, combined.  Cystoscopy, retrogrades, combined.  Pt was discharged home with plans for daily self cares to the left groin wound as listed in Current Treatment Plan below.  He is very active with work but is trying to limit lifting and overexertion.  Ongoing visits scheduled for evaluation in the wound and Ostomy clinic.     Past Medical History:  Patient Active Problem List   Diagnosis    Cellulitis of perineum               Tobacco Use:     Tobacco Use      Smoking status: Not on file      Smokeless tobacco: Not on file     Diabetic: No  HgbA1C: No results found for: \"A1C\"  Checks Blood Glucose?:  NA Average Readings: NA      Personal/social history:  Pt lives independently in the [facility], no current home care services in place.    Objective:   Current treatment plan: Left groin fold surgical wound: Washing with saline or a no rinse dermal spray cleanser, drying well.  Filling wound bed with Vashe dampened gauze, covering with two ABD pads secured with adult diaper and stretch undergarments.  Last changed: yesterday morning.    Wound #1 Left " groin, I&D site, DOS 1/27/24.  Stage/tissue depth: full thickness  15 cm L x 1.5 cm W x 1.8 cm D  Tunneling: none noted  Undermining: none noted  Wound bed type/amount: 100 % granular tissue; Not fluctuant  Wound Edges: open  Periwound: wnl  Drainage: small to moderate amounts serosanguinous.  Odor: no  Pain: yes 2/10 with cares today, max 2/10 during the day when up walking.   Photo from today's visit 2/8/24.    Photo from initial visit to the Wound and Ostomy clinic [facility] 2/5/24.     Dorsalis Pedal Pulse: palpable: Not assessed this visit.  Posterior Tibial Pulse: palpable: Not assessed this visit.  Hair growth: Not assessed this visit.  Capillary Refill: Not assessed this visit.  Feet/toes color: Not assessed this visit.  Nails: Not assessed this visit.  R Leg: Edema Not assessed this visit. Ankle circumference NA cm. Calf circumference NA cm.  L Leg: Edema Not assessed this visit. Ankle circumference NA cm. Calf circumference NA cm.    Mobility: slightly limited due to left groin fold  Current offloading/footwear: regular sneakers  Sensation: Not assessed this visit.    Other callusing/areas of concern: none noted    Diet: Regular    Assessment:  Today the wound dressing to the left groin is in place.  Packing well applied, ABD pads with small amounts serosanguinous drainage to the inner pad only, none to the outer of two in place.    The packing gauze was fully damp, was making contact with all the aspect of the inner wound bed, removed easily with no significant pain and no sticking.  The wound was cleansed with saline and dried well with gauze.      The superior aspect of the incision remains the most painful with any touch and with extended time on his feet.  The wound bed is now 100 % granular and is filling in the depth well including the small pocketing at 12 o'clock.  Slight decrease in width appears mostly related to contraction and not scar tissue growth.    Greatest depth remains is  near the upper 1/4 of the incision but has reduced slightly, all the rest of the incisions depth has decreased with most being between 0.5 to 1 cm D, superior and distal ends of the incision are flush with the wound edges now.    No signs of periwound or wound infection currently.    Factors impacting wound healing:   Poor nutrition: inadequate supply of protein, carbohydrates, fatty acids, and trace elements essential for all phases of wound healing, teaching reviewed from initial visit on need for increased protein in diet for healing, pt has fair appetite and does not normally eat large amounts of protein other than in hamburger.  Delayed healing as part of normal aging process  Reduced Blood Supply: inadequate perfusion to heal wound, appears adequate  Medication: NA  Chemotherapy: suppresses the immune system and inflammatory response, NA  Radiotherapy: increases production of free radical which damage cells, NA  Psychological stress: none noted  Obesity: decreases tissue perfusion  Infection: prolongs inflammatory phase, uses vital nutrients, impairs epithelialization and releases toxins, pt is on oral antibiotics and is using an antimicrobial primary dressing for wound cares - Antibiotic status not reassessed this visit 2/8/24.  Underlying Disease: history of multiple MRSA skin and wound sites in the past  Maceration: reduces wound tensile strength and inhibits epithelial migration, none noted  Patient compliance, appears motivated to heal  Unrelieved pressure, NA  Immobility, NA  Substance abuse: NA    Plan:  Pt will continue with the daily wound cares with Vashe dampened woven gauze to fill wound bed and two ABD pads to cover, secured with stretch undergarments and adult diaper.  While in clinic we used saline to dampen gauze as we do not stock Vashe in clinic and pt did not bring his bottle with.    Pt will follow up in clinic next week 2/13/24 and we will determine at that visit if he needs second follow  up the same week.    Discussed/Education provided: plan of care with rationale;     Topical care: Wound/surrounding skin cleansed with saline and gauze. Patted dry. Wound bed filled with ns damp sterile woven gauze, covered with two ABD pads secured with pt's adult diaper and medical stretch underwear.    Pt is able to perform cares/has been caring for wound.  Has mirrors and lighting at home to do himself.     Additional recommendations: None at this time    The following discharge instructions were reviewed with and sent home with the patient:  Declined AVS, took clinic business card with upcoming appointment written.    The following supplies were sent home with the patient:  Few extra ABD pads    Return visit: 2/13/24    Verbal, written, & demonstrative education provided.  Face to face time: approximately 20 minutes  Procedure: MOHINDER Dobbins RN, CWOCN  [phone]

## 2024-02-13 ENCOUNTER — HOSPITAL ENCOUNTER (OUTPATIENT)
Dept: WOUND CARE | Facility: CLINIC | Age: 64
Discharge: HOME | End: 2024-02-13
Attending: UROLOGY | Admitting: UROLOGY

## 2024-02-13 PROCEDURE — G0463 HOSPITAL OUTPT CLINIC VISIT: HCPCS

## 2024-02-13 NOTE — PROGRESS NOTES
[facility]    Start of Care in [facility]: 2/5/2024   Referring Provider: Dr Dharmesh Guzman MD  Primary Care Provider: No Ref-Primary, Physician   Wound Location: Left groin fold.      Wound Clinic Visit:  Scheduled follow up.     Reason for Visit: Assess and treat left groin fold wound.     Subjective:  On arrival today 2/13/24 alone for a scheduled follow up visit to the Wound and Ostomy clinic, reports the following:  Left groin wound appears to be improving well, very red wound bed, less drainage, no signs of infection.  Some periwound skin irritation, itching but all intact with no redness.  He has been only using one ABD pad and is no longer wearing the full adult diaper, as drainage is less, changing entire dressing normally daily but a few times the packing gauze came out while sitting on the toilet.  He is using a small feminine napkin in his stretch underwear for any lower leakage around the ABD pad.  He is scheduled to see urologist tomorrow, he has been having noted elena blood in his urine occasionally, some burning with urination, some hesitancy while urinating.  Blood noted when pt is more active.  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      Wound History:   Pt who was hospitalized at [facility] after being transferred from the [facility] ED on 1/25/24, discharged home 2/1/24.  Care coordinator at [facility] contacted the Wound and Ostomy clinic here at [facility] to set up follow up wound clinic appointments closer to the pt's home in [place].  Pt has a significant past medical history of multiple skin abscesses requiring I&D's, previous left knee MRSA infection requiring surgery, history of renal stones.  He presented to the [facility] ED 1/25/2024, ED report - left groin cellulitis with increased tenderness, redness, heat, and swelling, he had been on Bactrim  in the past and he actually tried taking some of his leftover home antibiotics for this.  In ED elevated white blood cell count of 21.7, CRP elevated, lactic acid was normal.  Was in acute renal failure, creatinine up to 2.17.  CT showed no abscess or gas formation and no evidence of Dipesh's gangrene, was positive for left-sided renal stones with mild to moderate hydronephrosis along with scrotal skin thickening and edema left greater than right with out an enhancing drainable abscess. Edema in the subcutaneous fat surrounding the left inguinal canal with lymphadenopathy but no evidence of gas within the soft tissues.  Started on vancomycin, Zosyn, clindamycin, transferred to [facility] as a direct admission.    CT scan incidentally discovered a large distal left ureteral stone, asymptomatic.  Per urology - Atrophic parenchyma of the left kidney.  Hydronephrosis down to the distal ureter where there is a large stone in the distal ureter.  There is soft tissue swelling of the left hemiscrotum, the left groin, and left perineum.  Urology 1/27/24 performed - Cystoscopy, left retrograde pyelogram, interpretation of fluoroscopic images, left ureteroscopy with thulium laser lithotripsy and stone basketing, left ureteral stent placement (scheduled for removal o/p 2/14/24). Incision and drainage of abscess of the left groin and perineum.  And Incision and drainage perineal, combined.  Cystoscopy, retrogrades, combined.  Pt was discharged home with plans for daily self cares to the left groin wound as listed in Current Treatment Plan below.  He is very active with work but is trying to limit lifting and overexertion.  Ongoing visits scheduled for evaluation in the wound and Ostomy clinic.     Past Medical History:  Patient Active Problem List   Diagnosis    Cellulitis of perineum               Tobacco Use:     Tobacco Use      Smoking status: Not on file      Smokeless tobacco: Not on file     Diabetic: No  HgbA1C: No results  "found for: \"A1C\"  Checks Blood Glucose?:  NA Average Readings: NA    Personal/social history:  Pt lives independently in the [facility], no current home care services in place.    Objective:   Current treatment plan: Left groin fold surgical wound: Washing with saline or a no rinse dermal spray cleanser, drying well.  Filling wound bed with Vashe dampened gauze, covering with one ABD pads secured with stretch undergarments.  Last changed: yesterday evening    Wound #1 Left groin, I&D site, DOS 1/27/24.  Stage/tissue depth: full thickness  15 cm L x 1 cm W x 0.8 cm D  Tunneling: none noted  Undermining: none noted  Wound bed type/amount: 100 % granular tissue; Not fluctuant  Wound Edges: open  Periwound: wnl  Drainage: small to moderate amounts serosanguinous.  Odor: no  Pain: yes 1/10 with cares today, max 2/10 during the day when up walking.   Photo's from today's visit 2/13/24.    Photo from 2/8/24.    Photo from initial visit to the Wound and Ostomy clinic [facility] 2/5/24.     Dorsalis Pedal Pulse: palpable: Not assessed this visit.  Posterior Tibial Pulse: palpable: Not assessed this visit.  Hair growth: Not assessed this visit.  Capillary Refill: Not assessed this visit.  Feet/toes color: Not assessed this visit.  Nails: Not assessed this visit.  R Leg: Edema Not assessed this visit. Ankle circumference NA cm. Calf circumference NA cm.  L Leg: Edema Not assessed this visit. Ankle circumference NA cm. Calf circumference NA cm.    Mobility: slightly limited due to left groin fold  Current offloading/footwear: regular sneakers  Sensation: Not assessed this visit.    Other callusing/areas of concern: none noted    Diet: Regular    Assessment:  Today the wound dressing to the left groin is in place.  Packing well applied, ABD pad with small amounts serosanguinous drainage to the inner pad.    The packing gauze was fully damp, was making contact with all the aspect of the inner wound bed, removed " easily with no significant pain and no sticking.  The wound was cleansed with saline and dried well with gauze.      The wound bed remains 100 % granular and is more beefy deep red today.  Continues to be filling in, works depth is 0.8 cm in middle, most is flush to 0.5 cm in depth.    Slight decrease in width again appears mostly related to contraction and not scar tissue growth.    Superior and distal ends of the incision are flush with the wound edges.    No signs of periwound or wound infection currently.    Factors impacting wound healing:   Poor nutrition: inadequate supply of protein, carbohydrates, fatty acids, and trace elements essential for all phases of wound healing, teaching reviewed again today on need for increased protein in diet for healing.  He is making efforts to eat more meat and other foods containing protein.   Delayed healing as part of normal aging process  Reduced Blood Supply: inadequate perfusion to heal wound, appears adequate  Medication: NA  Chemotherapy: suppresses the immune system and inflammatory response, NA  Radiotherapy: increases production of free radical which damage cells, NA  Psychological stress: none noted  Obesity: decreases tissue perfusion  Infection: prolongs inflammatory phase, uses vital nutrients, impairs epithelialization and releases toxins, pt is on oral antibiotics and is using an antimicrobial primary dressing for wound cares - Antibiotic status not reassessed this visit 2/13/24.  Underlying Disease: history of multiple MRSA skin and wound sites in the past  Maceration: reduces wound tensile strength and inhibits epithelial migration, none noted  Patient compliance, appears motivated to heal  Unrelieved pressure, NA  Immobility, NA  Substance abuse: NA    Plan:  Pt will continue with the daily wound cares with Vashe dampened woven gauze to fill wound bed, one ABD pad to cover, secured with stretch undergarments with small feminine napkin pad within for any  break through drainage.    While in clinic we used saline to dampen gauze as we do not stock Vashe in clinic and pt did not bring his bottle with.    Pt will follow up in clinic next week 2/23/24.  Pt expects if no contraindications or concerns noted by urology tomorrow, that he will leave out of town on Thursday this week and will be back some time next week, most likely on Thursday 2/22/24.    Revisit scheduled for Friday next week 2/23/24.     Discussed/Education provided: plan of care with rationale;     Topical care: Wound/surrounding skin cleansed with saline and gauze. Patted dry. Wound bed filled with ns damp sterile woven gauze, covered with oneABD pads secured with pt's and medical stretch underwear with feminine napkin within.    Pt is able to perform cares/has been caring for wound.  Has mirrors and lighting at home to do himself.     Additional recommendations: None at this time    The following discharge instructions were reviewed with and sent home with the patient:  Declined AVS.  Will get updates via [employer]    The following supplies were sent home with the patient:  Few extra ABD pads    Return visit: 2/23/24    Verbal, written, & demonstrative education provided.  Face to face time: approximately 20 minutes  Procedure: MOHINDER Dobbins RN, CWOCN  [phone]

## 2024-02-14 ENCOUNTER — OFFICE VISIT (OUTPATIENT)
Dept: UROLOGY | Facility: CLINIC | Age: 64
End: 2024-02-14

## 2024-02-14 VITALS
WEIGHT: 202 LBS | SYSTOLIC BLOOD PRESSURE: 132 MMHG | BODY MASS INDEX: 28.28 KG/M2 | DIASTOLIC BLOOD PRESSURE: 84 MMHG | HEIGHT: 71 IN

## 2024-02-14 DIAGNOSIS — N20.0 KIDNEY STONE: Primary | ICD-10-CM

## 2024-02-14 PROCEDURE — 99212 OFFICE O/P EST SF 10 MIN: CPT | Mod: 25 | Performed by: UROLOGY

## 2024-02-14 PROCEDURE — 52310 CYSTOSCOPY AND TREATMENT: CPT | Performed by: UROLOGY

## 2024-02-14 RX ORDER — LIDOCAINE HYDROCHLORIDE 20 MG/ML
JELLY TOPICAL ONCE
Status: COMPLETED | OUTPATIENT
Start: 2024-02-14 | End: 2024-02-14

## 2024-02-14 RX ADMIN — LIDOCAINE HYDROCHLORIDE 5 ML: 20 JELLY TOPICAL at 08:32

## 2024-02-14 ASSESSMENT — PAIN SCALES - GENERAL: PAINLEVEL: MILD PAIN (2)

## 2024-02-14 NOTE — LETTER
2/14/2024       RE: Tee Escudero  [id number] [facility] [id number]     Dear Colleague,    Thank you for referring your patient, Tee Escudero, to the [facility]IC [place] [facility]. Please see a copy of my visit note below.    Office Visit Note  [facility]  [phone]    UROLOGIC DIAGNOSES:   Left ureteral stone and abscess of the perineum    CURRENT INTERVENTIONS:   S/P left ureteroscopy and abscess drainage    HISTORY:   Tee returns to clinic today for stent removal.  He has been following with the wound clinic in [place] and his perineal wound has been healing well.  He has been feeling well.  The stent has not been bothersome for him.  His stone was composed of calcium oxalate mixed with calcium phosphate.  Serum calcium levels are slightly low.      PAST MEDICAL HISTORY: History reviewed. No pertinent past medical history.    PAST SURGICAL HISTORY:   Past Surgical History:   Procedure Laterality Date    CYSTOSCOPY, RETROGRADES, COMBINED Left 1/27/2024    Procedure: Cystoscopy, retrogrades, combined;  Surgeon: Buck Allen MD;  Location:  OR    INCISION AND DRAINAGE PERINEAL, COMBINED Left 1/27/2024    Procedure: Incision and drainage perineal, combined;  Surgeon: Buck Allen MD;  Location:  OR    LASER HOLMIUM LITHOTRIPSY URETER(S), INSERT STENT, COMBINED Left 1/27/2024    Procedure: Cystoscopy, left retrograde pyelogram, interpretation of fluoroscopic images, left ureteroscopy with thulium laser lithotripsy and stone basketing, left ureteral stent placement. Incision and drainage of abscess of the left groin and perineum 22 modifier for difficult and lengthy case;  Surgeon: Buck Allen MD;  Location:  OR    SOFT TISSUE SURGERY         FAMILY HISTORY: History reviewed. No pertinent family history.    SOCIAL HISTORY:   Social History     Socioeconomic History    Marital  "status: Single     Spouse name: None    Number of children: None    Years of education: None    Highest education level: None   Tobacco Use    Smoking status: Some Days     Types: Cigarettes    Smokeless tobacco: Former     Types: Chew       Review Of Systems:  Skin: No rash, pruritis, or skin pigmentation  Eyes: No changes in vision  Ears/Nose/Throat: No changes in hearing, no nosebleeds  Respiratory: No shortness of breath, dyspnea on exertion, cough, or hemoptysis  Cardiovascular: No chest pain or palpitations  Gastrointestinal: No diarrhea or constipation. No abdominal pain. No hematochezia  Genitourinary: see HPI  Musculoskeletal: No pain or swelling of joints, normal range of motion  Neurologic: No weakness or tremors  Psychiatric: No recent changes in memory or mood  Hematologic/Lymphatic/Immunologic: No easy bruising or enlarged lymph nodes  Endocrine: No weight gain or loss      PHYSICAL EXAM:    /84   Ht 1.803 m (5' 11\")   Wt 91.6 kg (202 lb)   BMI 28.17 kg/m      Constitutional: Well developed. Conversant and in no acute distress  Eyes: Anicteric sclera, conjunctiva clear, normal extraocular movements  ENT: Normocephalic and atraumatic,   Skin: Warm and dry. No rashes or lesions  Cardiac: No peripheral edema  Back/Flank: Not done  CNS/PNS: Normal musculature and movements, moves all extremities normally  Respiratory: Normal non-labored breathing  Abdomen: Soft nontender and nondistended  Peripheral Vascular: No peripheral edema  Mental Status/Psych: Alert and Oriented x 3. Normal mood and affect    Penis: Not done  Scrotal Skin: Not done  Testicles: Not done  Epididymis: Not done  Digital Rectal Exam:     Cystoscopy: I performed flexible cystoscopy and removed the stent without difficulty    Imaging: None    Urinalysis: UA RESULTS:  Recent Labs   Lab Test 01/25/24  2350   COLOR Yellow   APPEARANCE Clear   URINEGLC Negative   URINEBILI Negative   URINEKETONE 5*   SG 1.015   UBLD Negative   URINEPH " 5.0   PROTEIN Negative   NITRITE Negative   LEUKEST Negative   RBCU 1   WBCU 2       PSA:     Post Void Residual:     Other labs: None today    IMPRESSION:  Doing well, stent removed    PLAN:  His stone was quite encrusted in the distal ureter, which does put him at some risk to develop a ureteral stricture.  I recommended he have a renal ultrasound in 3 months to rule out the formation of hydronephrosis.  He will follow-up with our physician assistant for the results.  At that time if renal ultrasound is negative he can be discharged from the urology clinic.      Buck Allen M.D.

## 2024-02-14 NOTE — PROGRESS NOTES
Office Visit Note  [facility]  [phone]    UROLOGIC DIAGNOSES:   Left ureteral stone and abscess of the perineum    CURRENT INTERVENTIONS:   S/P left ureteroscopy and abscess drainage    HISTORY:   Tee returns to clinic today for stent removal.  He has been following with the wound clinic in [place] and his perineal wound has been healing well.  He has been feeling well.  The stent has not been bothersome for him.  His stone was composed of calcium oxalate mixed with calcium phosphate.  Serum calcium levels are slightly low.      PAST MEDICAL HISTORY: History reviewed. No pertinent past medical history.    PAST SURGICAL HISTORY:   Past Surgical History:   Procedure Laterality Date    CYSTOSCOPY, RETROGRADES, COMBINED Left 1/27/2024    Procedure: Cystoscopy, retrogrades, combined;  Surgeon: Buck Allen MD;  Location:  OR    INCISION AND DRAINAGE PERINEAL, COMBINED Left 1/27/2024    Procedure: Incision and drainage perineal, combined;  Surgeon: Buck Allen MD;  Location:  OR    LASER HOLMIUM LITHOTRIPSY URETER(S), INSERT STENT, COMBINED Left 1/27/2024    Procedure: Cystoscopy, left retrograde pyelogram, interpretation of fluoroscopic images, left ureteroscopy with thulium laser lithotripsy and stone basketing, left ureteral stent placement. Incision and drainage of abscess of the left groin and perineum 22 modifier for difficult and lengthy case;  Surgeon: Buck Allen MD;  Location: [facility] OR    SOFT TISSUE SURGERY         FAMILY HISTORY: History reviewed. No pertinent family history.    SOCIAL HISTORY:   Social History     Socioeconomic History    Marital status: Single     Spouse name: None    Number of children: None    Years of education: None    Highest education level: None   Tobacco Use    Smoking status: Some Days     Types: Cigarettes    Smokeless tobacco: Former     Types: Chew       Review Of Systems:  Skin: No rash, pruritis, or skin  "pigmentation  Eyes: No changes in vision  Ears/Nose/Throat: No changes in hearing, no nosebleeds  Respiratory: No shortness of breath, dyspnea on exertion, cough, or hemoptysis  Cardiovascular: No chest pain or palpitations  Gastrointestinal: No diarrhea or constipation. No abdominal pain. No hematochezia  Genitourinary: see HPI  Musculoskeletal: No pain or swelling of joints, normal range of motion  Neurologic: No weakness or tremors  Psychiatric: No recent changes in memory or mood  Hematologic/Lymphatic/Immunologic: No easy bruising or enlarged lymph nodes  Endocrine: No weight gain or loss      PHYSICAL EXAM:    /84   Ht 1.803 m (5' 11\")   Wt 91.6 kg (202 lb)   BMI 28.17 kg/m      Constitutional: Well developed. Conversant and in no acute distress  Eyes: Anicteric sclera, conjunctiva clear, normal extraocular movements  ENT: Normocephalic and atraumatic,   Skin: Warm and dry. No rashes or lesions  Cardiac: No peripheral edema  Back/Flank: Not done  CNS/PNS: Normal musculature and movements, moves all extremities normally  Respiratory: Normal non-labored breathing  Abdomen: Soft nontender and nondistended  Peripheral Vascular: No peripheral edema  Mental Status/Psych: Alert and Oriented x 3. Normal mood and affect    Penis: Not done  Scrotal Skin: Not done  Testicles: Not done  Epididymis: Not done  Digital Rectal Exam:     Cystoscopy: I performed flexible cystoscopy and removed the stent without difficulty    Imaging: None    Urinalysis: UA RESULTS:  Recent Labs   Lab Test 01/25/24  2350   COLOR Yellow   APPEARANCE Clear   URINEGLC Negative   URINEBILI Negative   URINEKETONE 5*   SG 1.015   UBLD Negative   URINEPH 5.0   PROTEIN Negative   NITRITE Negative   LEUKEST Negative   RBCU 1   WBCU 2       PSA:     Post Void Residual:     Other labs: None today      IMPRESSION:  Doing well, stent removed    PLAN:  His stone was quite encrusted in the distal ureter, which does put him at some risk to develop a " ureteral stricture.  I recommended he have a renal ultrasound in 3 months to rule out the formation of hydronephrosis.  He will follow-up with our physician assistant for the results.  At that time if renal ultrasound is negative he can be discharged from the urology clinic.        Buck Allen M.D.

## 2024-02-14 NOTE — PATIENT INSTRUCTIONS

## 2024-02-14 NOTE — NURSING NOTE
Chief Complaint   Patient presents with    Kidney Stone Related     Pt here for in office cystoscopy for stent removal      Prior to the start of the procedure and with procedural staff participation, I verbally confirmed the patient s identity using two indicators, relevant allergies, that the procedure was appropriate and matched the consent or emergent situation, and that the correct equipment/implants were available. Immediately prior to starting the procedure I conducted the Time Out with the procedural staff and re-confirmed the patient s name, procedure, and site/side. I have wiped the patient off with the povidone-Iodine solution, draped them,  used Lidocaine hydrochloride jelly, and instilled sterile water into the bladder. (The Joint Commission universal protocol was followed.)  Yes    Sedation (Moderate or Deep): None     5mL 2% lidocaine hydrochloride Urojet instilled into urethra.    NDC# [address]-5  Lot #: [id number]  Expiration Date:  03/25    Jess Douglas CMA

## 2024-03-07 NOTE — DISCHARGE SUMMARY
[facility]  Hospitalist Discharge Summary      Date of Admission:  1/26/2024  Date of Discharge:  2/1/2024  1:46 PM  Discharging Provider: Dharmesh Guzman MD  Discharge Service: Hospitalist Service    Discharge Diagnoses   Tee Escudero is a 64 year old male who has a previous history of multiple skin abscesses requiring I&D's, previous left knee MRSA infection requiring surgery, who has a history of renal stones presented to [facility] ER on 1/25/2024 with a groin cellulitis with increased tenderness, redness, heat, and swelling.  He has a history of MRSA in the past.  Had been on Bactrim in the past and he actually tried taking some of his leftover home antibiotics for this.  In the emergency room he was found to have an elevated white blood cell count of 21.7, his CRP was elevated, and his lactic acid was normal.  He also presented with acute renal failure with a creatinine up to 2.17.  He had a CT done showing no abscess or gas formation but does have left-sided renal stones with mild to moderate hydronephrosis along with scrotal skin thickening and edema left greater than right with out an enhancing drainable abscess. There is edema in the subcutaneous fat surrounding the left inguinal canal with lymphadenopathy but no evidence of gas within the soft tissues.  Patient was started on vancomycin, Zosyn, clindamycin at the outside facility and they requested transfer so the patient could be seen by urology and general surgery.              Patient is seen as a direct admission.  Currently denies any fever, chills, sweats.  States that his groin is about the same if not a little bit worse but his pain is stable.  Patient had some drainage out of the area yesterday and he thinks that there is an abscess.  Patient notes thickening of the skin of his left scrotum along with his gluteal cleft on that side.  Patient is making urine.  He has no other new complaints.     Staph aureus  perineal cellulitis/abscess, left inguinal lymphadenopathy  Patient presented to [facility] ER with 3 days of increased perineal redness, tenderness, and possible abscess.  Most certainly has a cellulitis.  Concern is that the patient could progress to Dipesh's gangrene.  Patient had a CT scan at the outside facility and did not show any drainable abscess.  He was sent to [facility] to have urology and general surgery involved just in case the patient should not get worse.  I have consulted both services.  Patient's cellulitis currently looks stable.  Will be started on IV fluids.  I will continue him on vancomycin, Zosyn, clindamycin that was started at the outside facility have pharmacy dose his antibiotics.  For pain he will be given Tylenol, oxycodone, and IV Dilaudid for breakthrough.  He has blood cultures pending from the outside facility which are still pending.   1/27/2024: Urology consulted for ureteral stone and while patient was intraoperative performed incision and drainage of cellulitis site, revealing drainage of pustular fluid collection. Wound packed. Cultures obtained and pending.  1/28/2024: Cultures returning with GPC and preliminarily Staph aureus. Zosyn + clindamycin discontinued. Continue vancomycin pending final culture and sensitivities.  1/29/2024: Sensitivities not yet returned. Cultures also with staph. lugdenesis. Will likely require ID consult for antibiotic duration, especially if MRSA. For now, continue vancomycin. Focusing on wound cares, as well.  1/30/2024: Cultures returned MRSA. Continue vancomycin. Infectious disease consulted for recommendations regarding ongoing treatment. WOCN following. Will need abx and wound care plans prior to discharge.  1/31/2024: Last day of IV vancomycin and can transition to doxycycline tomorrow per ID. Going to attempt dressing changes himself with nursing tonight and tomorrow morning to see if can manage at home. Will also need wound clinic on  "discharge.     Acute renal failure, prerenal versus infection versus obstructive uropathy versus ibuprofen induced, improving  Patient presented at the outside facility with a creatinine at 2.17.  This is in the setting of being a little dehydrated, having an active infection but without sepsis, use of ibuprofen, and also was found to have mild to moderate left-sided hydronephrosis on CT scan.  Patient was started on IV fluids and his creatinine has come down to 1.87.  I will continue him on fluids and follow daily chemistries.  Patient did have a urinalysis which was not impressive but did show some ketones.  On admission the patient's BUN was also elevated consistent with being prerenal.  1/27/2024: Urology performed removal of stone and stent placement. Continue post-op velasquez per urology.  1/28/2024-1/31/2024: Creatinine continuing to downtrend. Velasquez now removed by urology. Will need to follow up with urology outpatient for definitive stone management after discharge.     History of MRSA  Patient needs to be in contact isolation. Given this is second MRSA abscess. Will likely need intermittent MRSA decolonization of nares and rectum. Will need large perineal wound to heal first.     Left-sided renal stones with mild to moderate hydronephrosis  Seen on CT scans.  Patient does have a history of kidney stones in the past.  Patient does have acute renal failure as well.  Urology has been consulted.  I will place the patient on Flomax and IV fluids.  Patient's urinalysis did not show any blood.    Clinically Significant Risk Factors     # Overweight: Estimated body mass index is 28.17 kg/m  as calculated from the following:    Height as of this encounter: 1.803 m (5' 11\").    Weight as of this encounter: 91.6 kg (202 lb).       Follow-ups Needed After Discharge   Follow-up Appointments     Follow-up and recommended labs and tests       1) Follow up with urologist. They will call you to schedule appointment.    2) " Follow up with wound care clinic. Social work will be providing   information to schedule.    3) Follow up with primary doctor within 1 week.          Discharge Disposition   Discharged to home  Condition at discharge: Stable    Consultations This Hospital Stay   UROLOGY IP CONSULT  SURGERY GENERAL IP CONSULT  PHARMACY TO DOSE [facility]  SPIRITUAL HEALTH SERVICES IP CONSULT  WOUND OSTOMY CONTINENCE NURSE  IP CONSULT  PHYSICAL THERAPY ADULT IP CONSULT  INFECTIOUS DISEASES IP CONSULT  SPIRITUAL HEALTH SERVICES IP CONSULT    Code Status   Prior    Time Spent on this Encounter   IDharmesh MD, personally saw the patient today and spent greater than 30 minutes discharging this patient.       Dharmesh Guzman MD  [place] [address] MEDICAL SURGICAL  [address] [place] [address]  Phone: [phone]  Fax: [phone]  ______________________________________________________________________    Physical Exam   General: Very pleasant male resting comfortably in bedside chair. Dressed in street clothes.  Awake, alert, interactive. Appears slightly uncomfortable.  HEENT: Normocephalic, atraumatic.  PERRL, EOMI.  Conjunctiva clear, sclerae anicteric.  Mucous membranes moist.  Cardiac: Regular rate and rhythm without murmur, gallop, or rub.  No peripheral edema.  Respiratory: Normal work of breathing.  Clear to auscultation bilaterally without wheezing, rales, or rhonchi.  Musculoskeletal: Moving all extremities appropriately.  Skin: No rashes or abrasions on exposed skin.  Neurologic: Alert and oriented x4.  Cranial nerves II through XII grossly intact.  Psychologic: Appropriate mood and affect.    Primary Care Physician   Physician No Ref-Primary    Discharge Orders      STERILE WATER/SALINE, 500 ML    . Use to moisten/soften packing prior to removing from wound.     Wound Care Referral      Reason for your hospital stay    You were admitted to the hospital for MRSA abscess of your groin, as well as kidney stone.  You required ureteral stenting for the kidney stone, and incision/drainage of the abscess. You will need to take ongoing antibiotics for an additional 10 days.     Follow-up and recommended labs and tests     1) Follow up with urologist. They will call you to schedule appointment.    2) Follow up with wound care clinic. Social work will be providing information to schedule.    3) Follow up with primary doctor within 1 week.     Activity    Your activity upon discharge: activity as tolerated     Diet    Follow this diet upon discharge: Orders Placed This Encounter      Regular Diet Adult       Significant Results and Procedures   Most Recent 3 CBC's:  Recent Labs   Lab Test 01/27/24  1032 01/26/24 1935 01/25/24 2157   WBC 9.5 13.5* 21.7*   HGB 13.7 13.8 15.8   MCV 92 92 90    212 231     Most Recent 3 BMP's:  Recent Labs   Lab Test 01/31/24  1435 01/29/24  1516 01/27/24  1032 01/26/24  1935 01/26/24  0825 01/25/24 2157   NA  --   --  138 136  --  136   POTASSIUM  --   --  4.4 4.2  --  4.1   CHLORIDE  --   --  106 103  --  100   CO2  --   --  22 23  --  20*   BUN  --   --  16.5 22.7  --  27.0*   CR 1.44* 1.39* 1.48* 1.62*   < > 2.17*   ANIONGAP  --   --  10 10  --  16*   TEMITOPE  --   --  8.6* 8.7*  --  9.4   GLC  --   --  103* 87  --  91    < > = values in this interval not displayed.     Most Recent 2 LFT's:  Recent Labs   Lab Test 01/25/24 2157   AST 20   ALT 24   ALKPHOS 90   BILITOTAL 0.9   ,   Results for orders placed or performed during the hospital encounter of 01/26/24   XR Surgery JUNE L/T 5 Min Fluoro w Stills    Narrative    This exam was marked as non-reportable because it will not be read by a   radiologist or a [place] non-radiologist provider.             Discharge Medications   Discharge Medication List as of 2/1/2024 12:02 PM        START taking these medications    Details   cephALEXin (KEFLEX) 500 MG capsule Take 1 capsule (500 mg) by mouth every 6 hours for 5 days, Disp-20 capsule, R-0,  E-Prescribe      chlorhexidine (HIBICLENS) 4 % liquid Apply topically daily as needed for other . Use as soap while showering; lather on entire body (except face) leave for 5 minutes and then rinse off. Do this daily for 1 week at the same time as using the ointment on your nose.Disp-118 mL, [id number]      doxycycline hyclate (VIBRAMYCIN) 100 MG capsule Take 1 capsule (100 mg) by mouth every 12 hours for 19 doses, Disp-19 capsule, R-0, E-Prescribe      Elastic Bandages & Supports (CURITY STRETCH BANDAGE) MISC 1 each 2 times daily, Disp-24 each, R-1, E-Prescribe      mupirocin (BACTROBAN) 2 % external ointment Apply topically 2 times daily for 7 days to both nostrils to help kills any potential remaining MRSA colonization.Disp-15 g, [id number]      oxyBUTYnin (DITROPAN) 5 MG tablet Take 1 tablet (5 mg) by mouth 3 times daily as needed for bladder spasms, Disp-30 tablet, R-0, E-Prescribe      oxyCODONE (ROXICODONE) 5 MG tablet Take 1-2 tablets (5-10 mg) by mouth every 6 hours as needed for severe pain (IF pain not managed with non-pharmacological and non-opioid interventions), Disp-15 tablet, R-0, Local Print      tamsulosin (FLOMAX) 0.4 MG capsule Take 1 capsule (0.4 mg) by mouth daily, Disp-30 capsule, R-0, E-Prescribe      Wound Cleansers (VASHE WOUND THERAPY) SOLN Externally apply 50 mLs topically 2 times daily . Use to cleanse wound after removing bandages while changing packing, and then soak new bandages in vashe prior to packing them into wound., Disp-475 mL, R-3, E-Prescribe           Allergies   No Known Allergies

## 2024-05-21 ENCOUNTER — HOSPITAL ENCOUNTER (EMERGENCY)
Facility: CLINIC | Age: 64
Discharge: HOME | End: 2024-05-21
Attending: EMERGENCY MEDICINE | Admitting: EMERGENCY MEDICINE

## 2024-05-21 ENCOUNTER — APPOINTMENT (OUTPATIENT)
Dept: GENERAL RADIOLOGY | Facility: CLINIC | Age: 64
End: 2024-05-21
Attending: EMERGENCY MEDICINE

## 2024-05-21 VITALS
BODY MASS INDEX: 29.82 KG/M2 | RESPIRATION RATE: 18 BRPM | OXYGEN SATURATION: 96 % | SYSTOLIC BLOOD PRESSURE: 108 MMHG | HEIGHT: 71 IN | DIASTOLIC BLOOD PRESSURE: 92 MMHG | TEMPERATURE: 97.4 F | HEART RATE: 71 BPM | WEIGHT: 213 LBS

## 2024-05-21 DIAGNOSIS — S92.421B DISPLACED FRACTURE OF DISTAL PHALANX OF RIGHT GREAT TOE, INITIAL ENCOUNTER FOR OPEN FRACTURE: ICD-10-CM

## 2024-05-21 PROCEDURE — 29515 APPLICATION SHORT LEG SPLINT: CPT | Mod: RT | Performed by: EMERGENCY MEDICINE

## 2024-05-21 PROCEDURE — 73630 X-RAY EXAM OF FOOT: CPT | Mod: RT

## 2024-05-21 PROCEDURE — 99284 EMERGENCY DEPT VISIT MOD MDM: CPT | Mod: 25 | Performed by: EMERGENCY MEDICINE

## 2024-05-21 PROCEDURE — 99283 EMERGENCY DEPT VISIT LOW MDM: CPT | Performed by: EMERGENCY MEDICINE

## 2024-05-21 RX ORDER — HYDROCODONE BITARTRATE AND ACETAMINOPHEN 5; 325 MG/1; MG/1
1 TABLET ORAL EVERY 6 HOURS PRN
Qty: 12 TABLET | Refills: 0 | Status: SHIPPED | OUTPATIENT
Start: 2024-05-21 | End: 2024-05-24

## 2024-05-21 RX ORDER — CEPHALEXIN 500 MG/1
500 CAPSULE ORAL 3 TIMES DAILY
Qty: 15 CAPSULE | Refills: 0 | Status: SHIPPED | OUTPATIENT
Start: 2024-05-21 | End: 2024-05-26

## 2024-05-21 ASSESSMENT — COLUMBIA-SUICIDE SEVERITY RATING SCALE - C-SSRS
1. IN THE PAST MONTH, HAVE YOU WISHED YOU WERE DEAD OR WISHED YOU COULD GO TO SLEEP AND NOT WAKE UP?: NO
6. HAVE YOU EVER DONE ANYTHING, STARTED TO DO ANYTHING, OR PREPARED TO DO ANYTHING TO END YOUR LIFE?: NO
2. HAVE YOU ACTUALLY HAD ANY THOUGHTS OF KILLING YOURSELF IN THE PAST MONTH?: NO

## 2024-05-21 ASSESSMENT — ACTIVITIES OF DAILY LIVING (ADL)
ADLS_ACUITY_SCORE: 35
ADLS_ACUITY_SCORE: 35

## 2024-05-21 NOTE — ED PROVIDER NOTES
History     Chief Complaint   Patient presents with    Toe Injury     HPI  Tee Escudero is a 64 year old male who presents for evaluation of trauma to the right great toe.  This occurred last night when he had a washing machine flip on it and turned back his great toenail.  He had considerable bleeding and pain.  He wrapped this.  Presents here for evaluation today.  Pain is still significant.    Allergies:  No Known Allergies    Problem List:    Patient Active Problem List    Diagnosis Date Noted    Cellulitis of perineum 01/26/2024     Priority: Medium        Past Medical History:    History reviewed. No pertinent past medical history.    Past Surgical History:    Past Surgical History:   Procedure Laterality Date    CYSTOSCOPY, RETROGRADES, COMBINED Left 1/27/2024    Procedure: Cystoscopy, retrogrades, combined;  Surgeon: Buck Allen MD;  Location: [facility] OR    INCISION AND DRAINAGE PERINEAL, COMBINED Left 1/27/2024    Procedure: Incision and drainage perineal, combined;  Surgeon: Buck Allen MD;  Location:  OR    LASER HOLMIUM LITHOTRIPSY URETER(S), INSERT STENT, COMBINED Left 1/27/2024    Procedure: Cystoscopy, left retrograde pyelogram, interpretation of fluoroscopic images, left ureteroscopy with thulium laser lithotripsy and stone basketing, left ureteral stent placement. Incision and drainage of abscess of the left groin and perineum 22 modifier for difficult and lengthy case;  Surgeon: Buck Allen MD;  Location: [facility] OR    SOFT TISSUE SURGERY         Family History:    History reviewed. No pertinent family history.    Social History:  Marital Status:  Single [1]  Social History     Tobacco Use    Smoking status: Some Days     Types: Cigarettes    Smokeless tobacco: Former     Types: Chew        Medications:    cephALEXin (KEFLEX) 500 MG capsule  chlorhexidine (HIBICLENS) 4 % liquid  Elastic Bandages & Supports (CURITY STRETCH BANDAGE) MISC  HYDROcodone-acetaminophen  "(NORCO) 5-325 MG tablet  oxyBUTYnin (DITROPAN) 5 MG tablet  oxyCODONE (ROXICODONE) 5 MG tablet  tamsulosin (FLOMAX) 0.4 MG capsule  Wound Cleansers (VASHE WOUND THERAPY) SOLN          Review of Systems  All other systems are reviewed and are negative    Physical Exam   BP: (!) 108/92  Pulse: 71  Temp: 97.4  F (36.3  C)  Resp: 18  Height: 180.3 cm (5' 11\")  Weight: 96.6 kg (213 lb)  SpO2: 96 %      Physical Exam  Constitutional:       General: He is not in acute distress.     Appearance: He is well-developed. He is not diaphoretic.   HENT:      Head: Normocephalic and atraumatic.   Eyes:      General: No scleral icterus.     Conjunctiva/sclera: Conjunctivae normal.   Pulmonary:      Effort: Pulmonary effort is normal.   Musculoskeletal:      Cervical back: Normal range of motion and neck supple.      Comments: Right great toenail with some distal superior dislodgement.  Small distal laceration visible with some clotted blood about the toe.  No other obvious injury.     Skin:     General: Skin is warm and dry.      Coloration: Skin is not pale.      Findings: No erythema or rash.   Neurological:      Mental Status: He is alert.         ED Course        Procedures                  Results for orders placed or performed during the hospital encounter of 05/21/24 (from the past 24 hour(s))   XR Foot Right 3 Views    Narrative    XR FOOT RIGHT G/E 3 VIEWS 5/21/2024 11:45 AM     HISTORY: Pain after injury    COMPARISON: None.         Impression    IMPRESSION: Fracture of the distal aspect of the distal phalanx of the  great toe seen best on the lateral view. This does not extend into the  IP joint. Slight soft tissue swelling over the forefoot. Mild plantar  calcaneal spurring.    HÉCTOR LOW MD         SYSTEM ID:  [id number]       Medications - No data to display    Assessments & Plan (with Medical Decision Making)  64-year-old male with right great toe distal phalangeal fracture, partial toenail avulsion.  Case reviewed " with Dr. Gray, with podiatry.  Recommendation was for irrigation and placed in a boot and he would see him in follow-up in 2 days.  Prescribed Norco for pain and also prescribed Keflex for technically open toe fracture.     I have reviewed the nursing notes.    I have reviewed the findings, diagnosis, plan and need for follow up with the patient.          New Prescriptions    CEPHALEXIN (KEFLEX) 500 MG CAPSULE    Take 1 capsule (500 mg) by mouth 3 times daily for 5 days    HYDROCODONE-ACETAMINOPHEN (NORCO) 5-325 MG TABLET    Take 1 tablet by mouth every 6 hours as needed for severe pain       Final diagnoses:   Displaced fracture of distal phalanx of right great toe, initial encounter for open fracture       5/21/2024   [facility] EMERGENCY DEPT       Freedom Almaguer MD  05/21/24 [id number]

## 2024-05-21 NOTE — ED TRIAGE NOTES
PT here with c/o right toe pain. PT reports he was moving a washing machine about 1200H yesterday when he injured his foot. PT reports it was bleeding all night, bandage applied by PT.

## 2024-05-21 NOTE — DISCHARGE INSTRUCTIONS
Keep dressing in place and wear boot until seen in follow-up.  May use ibuprofen up to 600 mg 4 times a day for medium pain.  May also use Tylenol up to 650 mg 4 times a day

## 2024-05-23 ENCOUNTER — OFFICE VISIT (OUTPATIENT)
Dept: PODIATRY | Facility: CLINIC | Age: 64
End: 2024-05-23

## 2024-05-23 VITALS
SYSTOLIC BLOOD PRESSURE: 132 MMHG | HEIGHT: 71 IN | BODY MASS INDEX: 29.82 KG/M2 | TEMPERATURE: 97.7 F | WEIGHT: 213 LBS | DIASTOLIC BLOOD PRESSURE: 78 MMHG

## 2024-05-23 DIAGNOSIS — S91.209A AVULSION OF TOENAIL, INITIAL ENCOUNTER: ICD-10-CM

## 2024-05-23 DIAGNOSIS — S92.424A CLOSED NONDISPLACED FRACTURE OF DISTAL PHALANX OF RIGHT GREAT TOE, INITIAL ENCOUNTER: Primary | ICD-10-CM

## 2024-05-23 DIAGNOSIS — Z86.14 HISTORY OF MRSA INFECTION: ICD-10-CM

## 2024-05-23 PROCEDURE — 11730 AVULSION NAIL PLATE SIMPLE 1: CPT | Mod: T5 | Performed by: PODIATRIST

## 2024-05-23 PROCEDURE — 99203 OFFICE O/P NEW LOW 30 MIN: CPT | Mod: 25 | Performed by: PODIATRIST

## 2024-05-23 ASSESSMENT — PAIN SCALES - GENERAL: PAINLEVEL: SEVERE PAIN (7)

## 2024-05-23 NOTE — LETTER
5/23/2024         RE: Tee Escudero  [address] [facility] [address]        Dear Colleague,    Thank you for referring your patient, Tee Escudero, to the [facility]. Please see a copy of my visit note below.    HPI:  Tee Escudero is a 64 year old male who is seen in consultation at the request of ED DEPT - Freedom Almaguer MD    Pt presents for eval of:   (Onset, Location, L/R, Character, Treatments, Injury if yes)    XR Right foot 5/21/2024     Onset 5/21/2024, while moving a washing machine, lost control and rolled it onto Right great toe, with injury to Right great toenail.   Presents alone with Right great toe pain, WB w/tall gray fx boot.  Constant swelling, bruising, throbbing, tingling, pain 7-10/10    Works with [employer].    ROS:  10 point ROS neg other than the symptoms noted above in the HPI.    Patient Active Problem List   Diagnosis     Cellulitis of perineum       PAST MEDICAL HISTORY: History reviewed. No pertinent past medical history.     PAST SURGICAL HISTORY:   Past Surgical History:   Procedure Laterality Date     CYSTOSCOPY, RETROGRADES, COMBINED Left 1/27/2024    Procedure: Cystoscopy, retrogrades, combined;  Surgeon: Buck Allen MD;  Location: [facility] OR     INCISION AND DRAINAGE PERINEAL, COMBINED Left 1/27/2024    Procedure: Incision and drainage perineal, combined;  Surgeon: Buck Allen MD;  Location:  OR     LASER HOLMIUM LITHOTRIPSY URETER(S), INSERT STENT, COMBINED Left 1/27/2024    Procedure: Cystoscopy, left retrograde pyelogram, interpretation of fluoroscopic images, left ureteroscopy with thulium laser lithotripsy and stone basketing, left ureteral stent placement. Incision and drainage of abscess of the left groin and perineum 22 modifier for difficult and lengthy case;  Surgeon: Buck Allen MD;  Location: [facility] OR     SOFT TISSUE SURGERY          MEDICATIONS:   Current Outpatient Medications:       "cephALEXin (KEFLEX) 500 MG capsule, Take 1 capsule (500 mg) by mouth 3 times daily for 5 days, Disp: 15 capsule, Rfl: 0     chlorhexidine (HIBICLENS) 4 % liquid, Apply topically daily as needed for other . Use as soap while showering; lather on entire body (except face) leave for 5 minutes and then rinse off. Do this daily for 1 week at the same time as using the ointment on your nose., Disp: 118 mL, Rfl: 0     Elastic Bandages & Supports (CURITY STRETCH BANDAGE) MISC, 1 each 2 times daily, Disp: 24 each, Rfl: 1     HYDROcodone-acetaminophen (NORCO) 5-325 MG tablet, Take 1 tablet by mouth every 6 hours as needed for severe pain, Disp: 12 tablet, Rfl: 0     ALLERGIES:  No Known Allergies     SOCIAL HISTORY:   Social History     Socioeconomic History     Marital status: Single     Spouse name: Not on file     Number of children: Not on file     Years of education: Not on file     Highest education level: Not on file   Occupational History     Not on file   Tobacco Use     Smoking status: Some Days     Types: Cigarettes     Smokeless tobacco: Former     Types: Chew   Substance and Sexual Activity     Alcohol use: Not on file     Drug use: Not on file     Sexual activity: Not on file   Other Topics Concern     Not on file   Social History Narrative     Not on file     Social Determinants of Health     Financial Resource Strain: Not on file   Food Insecurity: Not on file   Transportation Needs: Not on file   Physical Activity: Not on file   Stress: Not on file   Social Connections: Not on file   Interpersonal Safety: Not on file   Housing Stability: Not on file        FAMILY HISTORY: History reviewed. No pertinent family history.     EXAM:Vitals: /78 (BP Location: Left arm, Patient Position: Sitting, Cuff Size: Adult Large)   Temp 97.7  F (36.5  C) (Temporal)   Ht 1.803 m (5' 11\")   Wt 96.6 kg (213 lb)   BMI 29.71 kg/m    BMI= Body mass index is 29.71 kg/m .    General appearance: Patient is alert and fully " cooperative with history & exam.  No sign of distress is noted during the visit.     Psychiatric: Affect is pleasant & appropriate.  Patient appears motivated to improve health.     Respiratory: Breathing is regular & unlabored while sitting.     HEENT: Hearing is intact to spoken word.  Speech is clear.  No gross evidence of visual impairment that would impact ambulation.     Vascular: DP & PT pulses are intact & regular bilaterally.  No significant edema or varicosities noted.  CFT and skin temperature is normal to both lower extremities.     Neurologic: Lower extremity sensation is intact to light touch.  No evidence of weakness or contracture in the lower extremities.  No evidence of neuropathy.    Dermatologic: Skin is intact to both lower extremities with adequate texture, turgor and tone about the integument.  Nail plate is avulsed from the nail bed.    Musculoskeletal: Patient is ambulatory with fracture boot.  He states since he has been in the fracture boot pain is much reduced.  He has continued bleeding and drainage from it however.  The nail is no longer attached with complete avulsion only held on by the eponychium.    Radiographs demonstrate nondisplaced fracture of the distal phalanx.     ASSESSMENT:       ICD-10-CM    1. Closed nondisplaced fracture of distal phalanx of right great toe, initial encounter  S92.424A       2. Avulsion of toenail, initial encounter  S91.209A       3. History of MRSA infection  Z86.14            PLAN:  Reviewed patient's chart in [facility].      5/23/2024   Interpreted radiographs  Recommend avulsion of the nail as it is detached through most of the nailbed and continues to cause the nailbed to bleed with a fracture beneath it.  Therefore I obtained informed consent prepped with Betadine and utilized 3 cc of lidocaine plain to achieve local anesthesia of the right hallux.  I then avulsed the remaining attachment of the entire nail plate.  Betadine was then applied to the  nailbed.  Nonadherent Adaptic and a bolster was applied with gentle compression of the hallux.  Remain in the fracture boot.  Continue oral antibiotics until gone.  Follow-up in 10 days.    Patient does have a history of MRSA from a previous contralateral leg infection.  That was years ago.  No signs of active infection therefore we will continue this antibiotic but if any signs of infection would then change to Bactrim due to his history of MRSA.      Alexis Gray DPM        Again, thank you for allowing me to participate in the care of your patient.        Sincerely,        Alexis Gray DPM

## 2024-05-23 NOTE — PROGRESS NOTES
HPI:  Tee Escudero is a 64 year old male who is seen in consultation at the request of ED DEPT - Freedom Almaguer MD    Pt presents for eval of:   (Onset, Location, L/R, Character, Treatments, Injury if yes)    XR Right foot 5/21/2024     Onset 5/21/2024, while moving a washing machine, lost control and rolled it onto Right great toe, with injury to Right great toenail.   Presents alone with Right great toe pain, WB w/tall gray fx boot.  Constant swelling, bruising, throbbing, tingling, pain 7-10/10    Works with [employer].    ROS:  10 point ROS neg other than the symptoms noted above in the HPI.    Patient Active Problem List   Diagnosis    Cellulitis of perineum       PAST MEDICAL HISTORY: History reviewed. No pertinent past medical history.     PAST SURGICAL HISTORY:   Past Surgical History:   Procedure Laterality Date    CYSTOSCOPY, RETROGRADES, COMBINED Left 1/27/2024    Procedure: Cystoscopy, retrogrades, combined;  Surgeon: Buck Allen MD;  Location: [facility] OR    INCISION AND DRAINAGE PERINEAL, COMBINED Left 1/27/2024    Procedure: Incision and drainage perineal, combined;  Surgeon: Buck Allen MD;  Location: [facility] OR    LASER HOLMIUM LITHOTRIPSY URETER(S), INSERT STENT, COMBINED Left 1/27/2024    Procedure: Cystoscopy, left retrograde pyelogram, interpretation of fluoroscopic images, left ureteroscopy with thulium laser lithotripsy and stone basketing, left ureteral stent placement. Incision and drainage of abscess of the left groin and perineum 22 modifier for difficult and lengthy case;  Surgeon: Buck Allen MD;  Location: [facility] OR    SOFT TISSUE SURGERY          MEDICATIONS:   Current Outpatient Medications:     cephALEXin (KEFLEX) 500 MG capsule, Take 1 capsule (500 mg) by mouth 3 times daily for 5 days, Disp: 15 capsule, Rfl: 0    chlorhexidine (HIBICLENS) 4 % liquid, Apply topically daily as needed for other . Use as soap while showering; lather on entire body  "(except face) leave for 5 minutes and then rinse off. Do this daily for 1 week at the same time as using the ointment on your nose., Disp: 118 mL, Rfl: 0    Elastic Bandages & Supports (CURITY STRETCH BANDAGE) MISC, 1 each 2 times daily, Disp: 24 each, Rfl: 1    HYDROcodone-acetaminophen (NORCO) 5-325 MG tablet, Take 1 tablet by mouth every 6 hours as needed for severe pain, Disp: 12 tablet, Rfl: 0     ALLERGIES:  No Known Allergies     SOCIAL HISTORY:   Social History     Socioeconomic History    Marital status: Single     Spouse name: Not on file    Number of children: Not on file    Years of education: Not on file    Highest education level: Not on file   Occupational History    Not on file   Tobacco Use    Smoking status: Some Days     Types: Cigarettes    Smokeless tobacco: Former     Types: Chew   Substance and Sexual Activity    Alcohol use: Not on file    Drug use: Not on file    Sexual activity: Not on file   Other Topics Concern    Not on file   Social History Narrative    Not on file     Social Determinants of Health     Financial Resource Strain: Not on file   Food Insecurity: Not on file   Transportation Needs: Not on file   Physical Activity: Not on file   Stress: Not on file   Social Connections: Not on file   Interpersonal Safety: Not on file   Housing Stability: Not on file        FAMILY HISTORY: History reviewed. No pertinent family history.     EXAM:Vitals: /78 (BP Location: Left arm, Patient Position: Sitting, Cuff Size: Adult Large)   Temp 97.7  F (36.5  C) (Temporal)   Ht 1.803 m (5' 11\")   Wt 96.6 kg (213 lb)   BMI 29.71 kg/m    BMI= Body mass index is 29.71 kg/m .    General appearance: Patient is alert and fully cooperative with history & exam.  No sign of distress is noted during the visit.     Psychiatric: Affect is pleasant & appropriate.  Patient appears motivated to improve health.     Respiratory: Breathing is regular & unlabored while sitting.     HEENT: Hearing is intact to " spoken word.  Speech is clear.  No gross evidence of visual impairment that would impact ambulation.     Vascular: DP & PT pulses are intact & regular bilaterally.  No significant edema or varicosities noted.  CFT and skin temperature is normal to both lower extremities.     Neurologic: Lower extremity sensation is intact to light touch.  No evidence of weakness or contracture in the lower extremities.  No evidence of neuropathy.    Dermatologic: Skin is intact to both lower extremities with adequate texture, turgor and tone about the integument.  Nail plate is avulsed from the nail bed.    Musculoskeletal: Patient is ambulatory with fracture boot.  He states since he has been in the fracture boot pain is much reduced.  He has continued bleeding and drainage from it however.  The nail is no longer attached with complete avulsion only held on by the eponychium.    Radiographs demonstrate nondisplaced fracture of the distal phalanx.     ASSESSMENT:       ICD-10-CM    1. Closed nondisplaced fracture of distal phalanx of right great toe, initial encounter  S92.424A       2. Avulsion of toenail, initial encounter  S91.209A       3. History of MRSA infection  Z86.14            PLAN:  Reviewed patient's chart in [facility].      5/23/2024   Interpreted radiographs  Recommend avulsion of the nail as it is detached through most of the nailbed and continues to cause the nailbed to bleed with a fracture beneath it.  Therefore I obtained informed consent prepped with Betadine and utilized 3 cc of lidocaine plain to achieve local anesthesia of the right hallux.  I then avulsed the remaining attachment of the entire nail plate.  Betadine was then applied to the nailbed.  Nonadherent Adaptic and a bolster was applied with gentle compression of the hallux.  Remain in the fracture boot.  Continue oral antibiotics until gone.  Follow-up in 10 days.    Patient does have a history of MRSA from a previous contralateral leg infection.  That  was years ago.  No signs of active infection therefore we will continue this antibiotic but if any signs of infection would then change to Bactrim due to his history of MRSA.      Alexis Gray DPM

## 2024-05-30 ENCOUNTER — HOSPITAL ENCOUNTER (EMERGENCY)
Facility: CLINIC | Age: 64
Discharge: HOME | End: 2024-05-31
Attending: STUDENT IN AN ORGANIZED HEALTH CARE EDUCATION/TRAINING PROGRAM | Admitting: STUDENT IN AN ORGANIZED HEALTH CARE EDUCATION/TRAINING PROGRAM

## 2024-05-30 DIAGNOSIS — R51.9 RIGHT FACIAL PAIN: ICD-10-CM

## 2024-05-30 DIAGNOSIS — B02.21 RAMSAY HUNT SYNDROME (GENICULATE HERPES ZOSTER): ICD-10-CM

## 2024-05-30 DIAGNOSIS — S05.01XA ABRASION OF RIGHT CORNEA, INITIAL ENCOUNTER: ICD-10-CM

## 2024-05-30 LAB
ALBUMIN SERPL BCG-MCNC: 4.2 G/DL (ref 3.5–5.2)
ALP SERPL-CCNC: 107 U/L (ref 40–150)
ALT SERPL W P-5'-P-CCNC: 40 U/L (ref 0–70)
ANION GAP SERPL CALCULATED.3IONS-SCNC: 10 MMOL/L (ref 7–15)
AST SERPL W P-5'-P-CCNC: 34 U/L (ref 0–45)
BASOPHILS # BLD AUTO: 0.1 10E3/UL (ref 0–0.2)
BASOPHILS NFR BLD AUTO: 1 %
BILIRUB SERPL-MCNC: 0.4 MG/DL
BUN SERPL-MCNC: 18.1 MG/DL (ref 8–23)
CALCIUM SERPL-MCNC: 9.3 MG/DL (ref 8.8–10.2)
CHLORIDE SERPL-SCNC: 103 MMOL/L (ref 98–107)
CREAT SERPL-MCNC: 1.61 MG/DL (ref 0.67–1.17)
CRP SERPL-MCNC: 8.27 MG/L
DEPRECATED HCO3 PLAS-SCNC: 25 MMOL/L (ref 22–29)
EGFRCR SERPLBLD CKD-EPI 2021: 48 ML/MIN/1.73M2
EOSINOPHIL # BLD AUTO: 0.2 10E3/UL (ref 0–0.7)
EOSINOPHIL NFR BLD AUTO: 3 %
ERYTHROCYTE [DISTWIDTH] IN BLOOD BY AUTOMATED COUNT: 12.7 % (ref 10–15)
ERYTHROCYTE [SEDIMENTATION RATE] IN BLOOD BY WESTERGREN METHOD: 2 MM/HR (ref 0–20)
GLUCOSE SERPL-MCNC: 102 MG/DL (ref 70–99)
HCT VFR BLD AUTO: 46.7 % (ref 40–53)
HGB BLD-MCNC: 16 G/DL (ref 13.3–17.7)
IMM GRANULOCYTES # BLD: 0 10E3/UL
IMM GRANULOCYTES NFR BLD: 0 %
LYMPHOCYTES # BLD AUTO: 2 10E3/UL (ref 0.8–5.3)
LYMPHOCYTES NFR BLD AUTO: 25 %
MCH RBC QN AUTO: 30.8 PG (ref 26.5–33)
MCHC RBC AUTO-ENTMCNC: 34.3 G/DL (ref 31.5–36.5)
MCV RBC AUTO: 90 FL (ref 78–100)
MONOCYTES # BLD AUTO: 1 10E3/UL (ref 0–1.3)
MONOCYTES NFR BLD AUTO: 13 %
NEUTROPHILS # BLD AUTO: 4.6 10E3/UL (ref 1.6–8.3)
NEUTROPHILS NFR BLD AUTO: 58 %
NRBC # BLD AUTO: 0 10E3/UL
NRBC BLD AUTO-RTO: 0 /100
PLATELET # BLD AUTO: 185 10E3/UL (ref 150–450)
POTASSIUM SERPL-SCNC: 4.1 MMOL/L (ref 3.4–5.3)
PROT SERPL-MCNC: 6.6 G/DL (ref 6.4–8.3)
RBC # BLD AUTO: 5.2 10E6/UL (ref 4.4–5.9)
SODIUM SERPL-SCNC: 138 MMOL/L (ref 135–145)
WBC # BLD AUTO: 8 10E3/UL (ref 4–11)

## 2024-05-30 PROCEDURE — 80053 COMPREHEN METABOLIC PANEL: CPT | Performed by: STUDENT IN AN ORGANIZED HEALTH CARE EDUCATION/TRAINING PROGRAM

## 2024-05-30 PROCEDURE — 86140 C-REACTIVE PROTEIN: CPT | Performed by: STUDENT IN AN ORGANIZED HEALTH CARE EDUCATION/TRAINING PROGRAM

## 2024-05-30 PROCEDURE — 99284 EMERGENCY DEPT VISIT MOD MDM: CPT | Performed by: STUDENT IN AN ORGANIZED HEALTH CARE EDUCATION/TRAINING PROGRAM

## 2024-05-30 PROCEDURE — 36415 COLL VENOUS BLD VENIPUNCTURE: CPT | Performed by: STUDENT IN AN ORGANIZED HEALTH CARE EDUCATION/TRAINING PROGRAM

## 2024-05-30 PROCEDURE — 85652 RBC SED RATE AUTOMATED: CPT | Performed by: STUDENT IN AN ORGANIZED HEALTH CARE EDUCATION/TRAINING PROGRAM

## 2024-05-30 PROCEDURE — 250N000013 HC RX MED GY IP 250 OP 250 PS 637: Performed by: STUDENT IN AN ORGANIZED HEALTH CARE EDUCATION/TRAINING PROGRAM

## 2024-05-30 PROCEDURE — 85025 COMPLETE CBC W/AUTO DIFF WBC: CPT | Performed by: STUDENT IN AN ORGANIZED HEALTH CARE EDUCATION/TRAINING PROGRAM

## 2024-05-30 RX ORDER — OXYCODONE HYDROCHLORIDE 5 MG/1
5 TABLET ORAL ONCE
Status: COMPLETED | OUTPATIENT
Start: 2024-05-30 | End: 2024-05-30

## 2024-05-30 RX ORDER — TETRACAINE HYDROCHLORIDE 5 MG/ML
1-2 SOLUTION OPHTHALMIC ONCE
Status: COMPLETED | OUTPATIENT
Start: 2024-05-30 | End: 2024-05-31

## 2024-05-30 RX ADMIN — OXYCODONE HYDROCHLORIDE 5 MG: 5 TABLET ORAL at 23:21

## 2024-05-30 ASSESSMENT — COLUMBIA-SUICIDE SEVERITY RATING SCALE - C-SSRS
6. HAVE YOU EVER DONE ANYTHING, STARTED TO DO ANYTHING, OR PREPARED TO DO ANYTHING TO END YOUR LIFE?: NO
2. HAVE YOU ACTUALLY HAD ANY THOUGHTS OF KILLING YOURSELF IN THE PAST MONTH?: NO
1. IN THE PAST MONTH, HAVE YOU WISHED YOU WERE DEAD OR WISHED YOU COULD GO TO SLEEP AND NOT WAKE UP?: NO

## 2024-05-30 ASSESSMENT — ACTIVITIES OF DAILY LIVING (ADL): ADLS_ACUITY_SCORE: 33

## 2024-05-30 NOTE — Clinical Note
Tee Escudero was seen and treated in our emergency department on 5/30/2024.  He may return to work on 06/03/2024.       If you have any questions or concerns, please don't hesitate to call.      Joaquin Andres MD

## 2024-05-31 VITALS
HEART RATE: 85 BPM | RESPIRATION RATE: 18 BRPM | BODY MASS INDEX: 29.53 KG/M2 | WEIGHT: 211.7 LBS | OXYGEN SATURATION: 92 % | SYSTOLIC BLOOD PRESSURE: 157 MMHG | TEMPERATURE: 98.5 F | DIASTOLIC BLOOD PRESSURE: 105 MMHG

## 2024-05-31 PROCEDURE — 250N000011 HC RX IP 250 OP 636: Performed by: STUDENT IN AN ORGANIZED HEALTH CARE EDUCATION/TRAINING PROGRAM

## 2024-05-31 PROCEDURE — 250N000013 HC RX MED GY IP 250 OP 250 PS 637: Performed by: STUDENT IN AN ORGANIZED HEALTH CARE EDUCATION/TRAINING PROGRAM

## 2024-05-31 PROCEDURE — 250N000009 HC RX 250: Performed by: STUDENT IN AN ORGANIZED HEALTH CARE EDUCATION/TRAINING PROGRAM

## 2024-05-31 PROCEDURE — 96372 THER/PROPH/DIAG INJ SC/IM: CPT | Performed by: STUDENT IN AN ORGANIZED HEALTH CARE EDUCATION/TRAINING PROGRAM

## 2024-05-31 RX ORDER — VALACYCLOVIR HYDROCHLORIDE 1 G/1
1000 TABLET, FILM COATED ORAL 3 TIMES DAILY
Qty: 21 TABLET | Refills: 0 | Status: SHIPPED | OUTPATIENT
Start: 2024-05-31 | End: 2024-06-14

## 2024-05-31 RX ORDER — VALACYCLOVIR HYDROCHLORIDE 500 MG/1
1000 TABLET, FILM COATED ORAL ONCE
Status: COMPLETED | OUTPATIENT
Start: 2024-05-31 | End: 2024-05-31

## 2024-05-31 RX ORDER — HYDROMORPHONE HYDROCHLORIDE 1 MG/ML
0.5 INJECTION, SOLUTION INTRAMUSCULAR; INTRAVENOUS; SUBCUTANEOUS ONCE
Status: COMPLETED | OUTPATIENT
Start: 2024-05-31 | End: 2024-05-31

## 2024-05-31 RX ORDER — ERYTHROMYCIN 5 MG/G
0.5 OINTMENT OPHTHALMIC 4 TIMES DAILY
Qty: 1 G | Refills: 0 | Status: SHIPPED | OUTPATIENT
Start: 2024-05-31 | End: 2024-06-07

## 2024-05-31 RX ORDER — ACETAMINOPHEN 325 MG/1
975 TABLET ORAL ONCE
Status: COMPLETED | OUTPATIENT
Start: 2024-05-31 | End: 2024-05-31

## 2024-05-31 RX ORDER — OXYCODONE HYDROCHLORIDE 5 MG/1
5 TABLET ORAL EVERY 6 HOURS PRN
Qty: 12 TABLET | Refills: 0 | Status: SHIPPED | OUTPATIENT
Start: 2024-05-31

## 2024-05-31 RX ORDER — AMOXICILLIN 875 MG
875 TABLET ORAL ONCE
Status: COMPLETED | OUTPATIENT
Start: 2024-05-31 | End: 2024-05-31

## 2024-05-31 RX ADMIN — ACETAMINOPHEN 975 MG: 325 TABLET, FILM COATED ORAL at 00:32

## 2024-05-31 RX ADMIN — TETRACAINE HYDROCHLORIDE 2 DROP: 5 SOLUTION OPHTHALMIC at 00:01

## 2024-05-31 RX ADMIN — HYDROMORPHONE HYDROCHLORIDE 0.5 MG: 1 INJECTION, SOLUTION INTRAMUSCULAR; INTRAVENOUS; SUBCUTANEOUS at 00:32

## 2024-05-31 RX ADMIN — VALACYCLOVIR HYDROCHLORIDE 1000 MG: 500 TABLET, FILM COATED ORAL at 00:32

## 2024-05-31 RX ADMIN — AMOXICILLIN 875 MG: 875 TABLET, FILM COATED ORAL at 00:32

## 2024-05-31 ASSESSMENT — ACTIVITIES OF DAILY LIVING (ADL): ADLS_ACUITY_SCORE: 35

## 2024-05-31 NOTE — DISCHARGE INSTRUCTIONS
I think your symptoms are very likely due to a viral infection that is affecting the nerves in your face.  This explains your ear pain, facial rash, and some of the swelling around your eye.    Prescription for an antiviral medication (Valtrex) was sent to the pharmacy.  Take this as instructed until entirely gone.  Prescription for a pain medication was sent to the pharmacy tonight as well, use this as needed.    You also have a small scratch to the lower part of your right eye.  The eye doctor recommended that you take an antibiotic ointment 4 times daily.  They will also schedule you for follow-up in clinic tomorrow.  Expect a call from them in the morning.    Return to the emergency department immediately for any severe pain, vision loss, fevers, issues with balance or coordination, other new or acutely worsening symptoms.

## 2024-05-31 NOTE — ED PROVIDER NOTES
"  History     Chief Complaint   Patient presents with    Facial Swelling     HPI  Tee Escudero is a 64 year old male with no relevant medical history who presents for evaluation of right facial pain and swelling.  Symptoms started 2 days ago and have progressively worsened since onset.  He first noticed some minor pain to the right ear and preauricular region, which he describes as a baseline discomfort and occasional flares of shock-like/shooting pain across the right side of his face.  This has been associated with some small scab appearing skin lesions to the right upper forehead/scalp and to the bridge of his nose.  There is also a small \"lump\" just in front of his right ear that is tender to touch.  Then since yesterday, patient notes some progressive swelling to the right side of his face and some pain to the eye/blurry vision on the right side.  This is also associated with a generalized headache on the right side that has not improved with over-the-counter medications.  Patient denies having any of the symptoms on the left side.  He has no history of shingles and denies any obvious vesicular appearing lesions.  Patient further denies having any fevers, neck stiffness, pain with movement of the eye, focal numbness/tingling/weakness, other complaints.    Allergies:  No Known Allergies    Problem List:    Patient Active Problem List    Diagnosis Date Noted    Cellulitis of perineum 01/26/2024     Priority: Medium      Past Medical History:    No past medical history on file.    Past Surgical History:    Past Surgical History:   Procedure Laterality Date    CYSTOSCOPY, RETROGRADES, COMBINED Left 1/27/2024    Procedure: Cystoscopy, retrogrades, combined;  Surgeon: Buck Allen MD;  Location:  OR    INCISION AND DRAINAGE PERINEAL, COMBINED Left 1/27/2024    Procedure: Incision and drainage perineal, combined;  Surgeon: Buck Allen MD;  Location:  OR    LASER HOLMIUM LITHOTRIPSY " URETER(S), INSERT STENT, COMBINED Left 1/27/2024    Procedure: Cystoscopy, left retrograde pyelogram, interpretation of fluoroscopic images, left ureteroscopy with thulium laser lithotripsy and stone basketing, left ureteral stent placement. Incision and drainage of abscess of the left groin and perineum 22 modifier for difficult and lengthy case;  Surgeon: Buck Allen MD;  Location: [facility] OR    SOFT TISSUE SURGERY       Family History:    No family history on file.    Social History:  Marital Status:  Single [1]  Social History     Tobacco Use    Smoking status: Some Days     Types: Cigarettes    Smokeless tobacco: Former     Types: Chew      Medications:    erythromycin (ROMYCIN) 5 MG/GM ophthalmic ointment  oxyCODONE (ROXICODONE) 5 MG tablet  valACYclovir (VALTREX) 1000 mg tablet  chlorhexidine (HIBICLENS) 4 % liquid  Elastic Bandages & Supports (CURITY STRETCH BANDAGE) MISC      Review of Systems   All other systems reviewed and are negative.  See HPI.    Physical Exam   BP: (!) 157/105  Pulse: 85  Temp: 98.5  F (36.9  C)  Resp: 18  Weight: 96 kg (211 lb 11.2 oz)  SpO2: 99 %    Physical Exam  Vitals and nursing note reviewed.   Constitutional:       General: He is in acute distress.      Appearance: Normal appearance. He is not toxic-appearing.      Comments: Patient appears quite uncomfortable.  Has difficulty opening the right eye due to pain and swelling.  Otherwise appears nontoxic and is fully alert/answering questions appropriately.   HENT:      Head:      Comments: Patient has an approximately 1 cm rubbery area of induration to the preauricular region.  This is mildly tender to palpation.  No direct erythema or warmth to the area.  Separately, patient also has a few scattered areas of papular appearing scabs to the hairline in the right upper forehead and also similar but fainter appearing lesions to the bridge/tip of his nose.       Ears:      Comments: The right TM appears full and is somewhat  bulging/erythematous.  There is a whitish appearance to the TM of uncertain etiology, no obvious effusion, no bullae or clear vesicular appearing lesions.  Canals unremarkable.  No mastoid tenderness or overlying skin changes.     Mouth/Throat:      Mouth: Mucous membranes are moist.      Pharynx: Oropharynx is clear. No oropharyngeal exudate or posterior oropharyngeal erythema.      Comments: No mucosal abnormalities/rash.  Dentition unremarkable.  Eyes:      General: No scleral icterus.     Extraocular Movements: Extraocular movements intact.      Conjunctiva/sclera: Conjunctivae normal.      Pupils: Pupils are equal, round, and reactive to light.      Comments: There is some generalized erythema surrounding the right eye including the lids, but he does not have proptosis and extraocular movements appear normal.  The skin is not erythematous or warm.  Woods lamp/fluorescein exam was performed to the right eye and this showed an approximately 2 mm corneal abrasion just below the iris around the 6 o'clock position, but no dendritic lesions or any evidence of globe injury.   Cardiovascular:      Rate and Rhythm: Normal rate and regular rhythm.      Pulses: Normal pulses.      Heart sounds: Normal heart sounds.   Pulmonary:      Effort: Pulmonary effort is normal. No respiratory distress.      Breath sounds: Normal breath sounds.   Abdominal:      Palpations: Abdomen is soft.      Tenderness: There is no abdominal tenderness.   Musculoskeletal:         General: No deformity. Normal range of motion.      Cervical back: Neck supple.   Skin:     General: Skin is warm.      Capillary Refill: Capillary refill takes less than 2 seconds.      Findings: Rash present.      Comments: See head exam.   Neurological:      General: No focal deficit present.      Mental Status: He is alert and oriented to person, place, and time.      Cranial Nerves: No cranial nerve deficit.      Sensory: No sensory deficit.      Motor: No  weakness.      Coordination: Coordination normal.      Gait: Gait normal.      Comments: Cranial nerves appear intact.  Facial movements a bit difficult to assess due to some right upper facial swelling, but no clear weakness present.  Moving all extremity spontaneously with equal strength throughout.  Normal gait and finger-nose-finger.   Psychiatric:      Comments: Very anxious.  Otherwise normal.         ED Course     ED Course as of 05/31/24 0438   Fri May 31, 2024   0013 Plan on treating the patient with Valtrex, reviewed with pharmacy, no need for renal adjustment based on current labs.     Procedures            Results for orders placed or performed during the hospital encounter of 05/30/24 (from the past 24 hour(s))   CBC with platelets differential    Narrative    The following orders were created for panel order CBC with platelets differential.  Procedure                               Abnormality         Status                     ---------                               -----------         ------                     CBC with platelets and d...[[id number]]                      Final result                 Please view results for these tests on the individual orders.   Comprehensive metabolic panel   Result Value Ref Range    Sodium 138 135 - 145 mmol/L    Potassium 4.1 3.4 - 5.3 mmol/L    Carbon Dioxide (CO2) 25 22 - 29 mmol/L    Anion Gap 10 7 - 15 mmol/L    Urea Nitrogen 18.1 8.0 - 23.0 mg/dL    Creatinine 1.61 (H) 0.67 - 1.17 mg/dL    GFR Estimate 48 (L) >60 mL/min/1.73m2    Calcium 9.3 8.8 - 10.2 mg/dL    Chloride 103 98 - 107 mmol/L    Glucose 102 (H) 70 - 99 mg/dL    Alkaline Phosphatase 107 40 - 150 U/L    AST 34 0 - 45 U/L    ALT 40 0 - 70 U/L    Protein Total 6.6 6.4 - 8.3 g/dL    Albumin 4.2 3.5 - 5.2 g/dL    Bilirubin Total 0.4 <=1.2 mg/dL   CRP inflammation   Result Value Ref Range    CRP Inflammation 8.27 (H) <5.00 mg/L   Erythrocyte sedimentation rate auto   Result Value Ref Range     Erythrocyte Sedimentation Rate 2 0 - 20 mm/hr   CBC with platelets and differential   Result Value Ref Range    WBC Count 8.0 4.0 - 11.0 10e3/uL    RBC Count 5.20 4.40 - 5.90 10e6/uL    Hemoglobin 16.0 13.3 - 17.7 g/dL    Hematocrit 46.7 40.0 - 53.0 %    MCV 90 78 - 100 fL    MCH 30.8 26.5 - 33.0 pg    MCHC 34.3 31.5 - 36.5 g/dL    RDW 12.7 10.0 - 15.0 %    Platelet Count 185 150 - 450 10e3/uL    % Neutrophils 58 %    % Lymphocytes 25 %    % Monocytes 13 %    % Eosinophils 3 %    % Basophils 1 %    % Immature Granulocytes 0 %    NRBCs per 100 WBC 0 <1 /100    Absolute Neutrophils 4.6 1.6 - 8.3 10e3/uL    Absolute Lymphocytes 2.0 0.8 - 5.3 10e3/uL    Absolute Monocytes 1.0 0.0 - 1.3 10e3/uL    Absolute Eosinophils 0.2 0.0 - 0.7 10e3/uL    Absolute Basophils 0.1 0.0 - 0.2 10e3/uL    Absolute Immature Granulocytes 0.0 <=0.4 10e3/uL    Absolute NRBCs 0.0 10e3/uL       Medications   oxyCODONE (ROXICODONE) tablet 5 mg (5 mg Oral $Given 5/30/24 2321)   tetracaine (PONTOCAINE) 0.5 % ophthalmic solution 1-2 drop (2 drops Both Eyes $Given 5/31/24 0001)   valACYclovir (VALTREX) tablet 1,000 mg (1,000 mg Oral $Given 5/31/24 0032)   amoxicillin (AMOXIL) tablet 875 mg (875 mg Oral $Given 5/31/24 0032)   acetaminophen (TYLENOL) tablet 975 mg (975 mg Oral $Given 5/31/24 0032)   HYDROmorphone (PF) (DILAUDID) injection 0.5 mg (0.5 mg Intramuscular $Given 5/31/24 0032)       Assessments & Plan (with Medical Decision Making)     I have reviewed the nursing notes.    I have reviewed the findings, diagnosis, plan and need for follow up with the patient.    Medical Decision Making  Tee Escudero is a 64 year old male with no relevant medical history who presents for evaluation of right facial pain and swelling.  Hypertensive on arrival, but also visibly uncomfortable/anxious.  Vitals otherwise normal.  Exam is most significant for some mild diffuse swelling throughout the right upper face, mostly around the eye itself.  The skin is not  erythematous or warm to touch.  He does have a few scattered papular appearing scabs to the forehead and to the bridge/tip of the nose.  The right TM is full and has a whitish appearance, but no clear bullae or vesicular lesions.  Patient also has what is likely a mildly tender preauricular lymph node.  Ocular exam is most significant for a small corneal abrasion with no dendritic lesions.  No proptosis or pain on extraocular movements.  No mucosal abnormalities.    Collectively I think his symptoms are most consistent with a herpes zoster infection, potentially [place] Alberts syndrome given concurrent ear pain/abnormal appearance and facial pain/rash.  Patient had some mild diffuse tenderness around the temporal/forehead region as well, so differential theoretically could include temporal arteritis.  I have very low suspicion for facial or orbital cellulitis based on this presentation.  We did obtain some baseline blood work to assess for global signs of infection/inflammation.  His CRP was minimally elevated, but ESR was within normal limits, and he had no leukocytosis.  Mild MUKUL (slightly worse than baseline) was noted as well.  I highly doubt significant bacterial infection or temporal arteritis based on these findings.  We did administer a dose of Valtrex in the emergency department and treated empirically with amoxicillin prior to return of lab results.  I do not see findings of herpes zoster ophthalmicus today, but the cause of his corneal abrasion is unclear since he denies any known injury.  I discussed his case with ophthalmology.  They recommended giving him some erythromycin ointment and will make arrangements to see him in clinic tomorrow.    Patient did not have significant improvement after a dose of oxycodone, but felt much better after single dose of IM Dilaudid.  He was able to sleep for over an hour while awaiting the above results.  We will move forward treating the patient with Valtrex.   Prescription was sent to to the pharmacy.  Will hold off on empiric antibiotics for an ear infection given likely viral etiology, though also discussed very strict return precautions with the patient.  He will follow-up with ophthalmology and his PCP as soon as possible, and agrees to return sooner for any new or acutely worsening symptoms.  We specifically discussed significant vision changes, fevers, worsening ear pain despite antivirals, or any onset of lateralizing neurological symptoms.    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

## 2024-06-01 ENCOUNTER — NURSE TRIAGE (OUTPATIENT)
Dept: NURSING | Facility: CLINIC | Age: 64
End: 2024-06-01

## 2024-06-01 ENCOUNTER — HOSPITAL ENCOUNTER (EMERGENCY)
Facility: CLINIC | Age: 64
Discharge: HOME | End: 2024-06-01
Attending: FAMILY MEDICINE | Admitting: FAMILY MEDICINE

## 2024-06-01 VITALS
DIASTOLIC BLOOD PRESSURE: 87 MMHG | RESPIRATION RATE: 16 BRPM | SYSTOLIC BLOOD PRESSURE: 142 MMHG | TEMPERATURE: 97.5 F | OXYGEN SATURATION: 97 % | HEART RATE: 83 BPM

## 2024-06-01 DIAGNOSIS — B02.30 HERPES ZOSTER OPHTHALMICUS OF RIGHT EYE: ICD-10-CM

## 2024-06-01 DIAGNOSIS — R51.9 FACIAL PAIN: ICD-10-CM

## 2024-06-01 LAB
ANION GAP SERPL CALCULATED.3IONS-SCNC: 9 MMOL/L (ref 7–15)
BASOPHILS # BLD AUTO: 0 10E3/UL (ref 0–0.2)
BASOPHILS NFR BLD AUTO: 0 %
BUN SERPL-MCNC: 13.7 MG/DL (ref 8–23)
CALCIUM SERPL-MCNC: 8.8 MG/DL (ref 8.8–10.2)
CHLORIDE SERPL-SCNC: 101 MMOL/L (ref 98–107)
CREAT SERPL-MCNC: 1.6 MG/DL (ref 0.67–1.17)
DEPRECATED HCO3 PLAS-SCNC: 26 MMOL/L (ref 22–29)
EGFRCR SERPLBLD CKD-EPI 2021: 48 ML/MIN/1.73M2
EOSINOPHIL # BLD AUTO: 0.2 10E3/UL (ref 0–0.7)
EOSINOPHIL NFR BLD AUTO: 3 %
ERYTHROCYTE [DISTWIDTH] IN BLOOD BY AUTOMATED COUNT: 12.4 % (ref 10–15)
GLUCOSE SERPL-MCNC: 104 MG/DL (ref 70–99)
HCT VFR BLD AUTO: 45.8 % (ref 40–53)
HGB BLD-MCNC: 15.8 G/DL (ref 13.3–17.7)
IMM GRANULOCYTES # BLD: 0 10E3/UL
IMM GRANULOCYTES NFR BLD: 0 %
LYMPHOCYTES # BLD AUTO: 1.8 10E3/UL (ref 0.8–5.3)
LYMPHOCYTES NFR BLD AUTO: 27 %
MCH RBC QN AUTO: 31.1 PG (ref 26.5–33)
MCHC RBC AUTO-ENTMCNC: 34.5 G/DL (ref 31.5–36.5)
MCV RBC AUTO: 90 FL (ref 78–100)
MONOCYTES # BLD AUTO: 0.9 10E3/UL (ref 0–1.3)
MONOCYTES NFR BLD AUTO: 14 %
NEUTROPHILS # BLD AUTO: 3.8 10E3/UL (ref 1.6–8.3)
NEUTROPHILS NFR BLD AUTO: 56 %
NRBC # BLD AUTO: 0 10E3/UL
NRBC BLD AUTO-RTO: 0 /100
PLATELET # BLD AUTO: 173 10E3/UL (ref 150–450)
POTASSIUM SERPL-SCNC: 4.1 MMOL/L (ref 3.4–5.3)
RBC # BLD AUTO: 5.08 10E6/UL (ref 4.4–5.9)
SODIUM SERPL-SCNC: 136 MMOL/L (ref 135–145)
WBC # BLD AUTO: 6.8 10E3/UL (ref 4–11)

## 2024-06-01 PROCEDURE — 96375 TX/PRO/DX INJ NEW DRUG ADDON: CPT

## 2024-06-01 PROCEDURE — 258N000003 HC RX IP 258 OP 636: Performed by: FAMILY MEDICINE

## 2024-06-01 PROCEDURE — 96374 THER/PROPH/DIAG INJ IV PUSH: CPT

## 2024-06-01 PROCEDURE — 85025 COMPLETE CBC W/AUTO DIFF WBC: CPT | Performed by: FAMILY MEDICINE

## 2024-06-01 PROCEDURE — 99284 EMERGENCY DEPT VISIT MOD MDM: CPT | Performed by: FAMILY MEDICINE

## 2024-06-01 PROCEDURE — 250N000011 HC RX IP 250 OP 636: Performed by: FAMILY MEDICINE

## 2024-06-01 PROCEDURE — 96361 HYDRATE IV INFUSION ADD-ON: CPT

## 2024-06-01 PROCEDURE — 36415 COLL VENOUS BLD VENIPUNCTURE: CPT | Performed by: FAMILY MEDICINE

## 2024-06-01 PROCEDURE — 80048 BASIC METABOLIC PNL TOTAL CA: CPT | Performed by: FAMILY MEDICINE

## 2024-06-01 PROCEDURE — 99284 EMERGENCY DEPT VISIT MOD MDM: CPT | Mod: 25

## 2024-06-01 RX ORDER — KETOROLAC TROMETHAMINE 30 MG/ML
30 INJECTION, SOLUTION INTRAMUSCULAR; INTRAVENOUS ONCE
Status: COMPLETED | OUTPATIENT
Start: 2024-06-01 | End: 2024-06-01

## 2024-06-01 RX ORDER — METHYLPREDNISOLONE 4 MG
TABLET, DOSE PACK ORAL
Qty: 21 TABLET | Refills: 0 | Status: SHIPPED | OUTPATIENT
Start: 2024-06-01 | End: 2024-06-14

## 2024-06-01 RX ORDER — DEXAMETHASONE SODIUM PHOSPHATE 10 MG/ML
10 INJECTION, SOLUTION INTRAMUSCULAR; INTRAVENOUS ONCE
Status: COMPLETED | OUTPATIENT
Start: 2024-06-01 | End: 2024-06-01

## 2024-06-01 RX ORDER — HYDROCODONE BITARTRATE AND ACETAMINOPHEN 5; 325 MG/1; MG/1
1 TABLET ORAL EVERY 4 HOURS PRN
Qty: 12 TABLET | Refills: 0 | Status: SHIPPED | OUTPATIENT
Start: 2024-06-01 | End: 2024-06-28

## 2024-06-01 RX ORDER — HYDROMORPHONE HYDROCHLORIDE 1 MG/ML
0.5 INJECTION, SOLUTION INTRAMUSCULAR; INTRAVENOUS; SUBCUTANEOUS ONCE
Status: COMPLETED | OUTPATIENT
Start: 2024-06-01 | End: 2024-06-01

## 2024-06-01 RX ADMIN — DEXAMETHASONE SODIUM PHOSPHATE 10 MG: 10 INJECTION, SOLUTION INTRAMUSCULAR; INTRAVENOUS at 03:30

## 2024-06-01 RX ADMIN — KETOROLAC TROMETHAMINE 30 MG: 30 INJECTION, SOLUTION INTRAMUSCULAR at 03:30

## 2024-06-01 RX ADMIN — SODIUM CHLORIDE 1000 ML: 9 INJECTION, SOLUTION INTRAVENOUS at 03:30

## 2024-06-01 ASSESSMENT — ACTIVITIES OF DAILY LIVING (ADL)
ADLS_ACUITY_SCORE: 35

## 2024-06-01 NOTE — TELEPHONE ENCOUNTER
Patient is calling to report worsening symptoms.  He was seen in the ED 5/30/2024 for facial swelling and was sent home with medications.  Pt saw an eye doctor this morning.  Tonight, right side of face is swollen, eye is very swollen and travelling to the left eye.  He has severe headaches and pain in his right eye.  Per ED AVS, pt to come back with worsening symptoms.  Advised pt to go back to the ED.  He verbalized understanding and agrees.    Mary Alexander, RN, BSN Nurse Triage Advisor 6/1/2024 1:20 AM       Reason for Disposition   Nursing judgment    Protocols used: No Guideline or Reference [id number]

## 2024-06-01 NOTE — ED TRIAGE NOTES
Pt was seen in ED yesterday. Edema and pain to R eye has increased. Medications prescribed yesterday are not helping.     Triage Assessment (Adult)       Row Name 06/01/24 0203          Triage Assessment    Airway WDL WDL        Respiratory WDL    Respiratory WDL WDL        Skin Circulation/Temperature WDL    Skin Circulation/Temperature WDL WDL        Cardiac WDL    Cardiac WDL WDL        Peripheral/Neurovascular WDL    Peripheral Neurovascular WDL WDL        Cognitive/Neuro/Behavioral WDL    Cognitive/Neuro/Behavioral WDL WDL

## 2024-06-01 NOTE — DISCHARGE INSTRUCTIONS
It appears that you have a form of shingles that is affecting the eye.  Continue your Valtrex 1 g 3 times a day, and add the Medrol Dosepak.  Take Norco 1 tablet every 4 hours as needed for moderate to severe pain.  Add ibuprofen up to 600 mg every 6 hours as needed for mild to moderate pain.  Please follow-up with ophthalmologist on Monday or Tuesday as planned.  Return to the emergency department if your symptoms worsen.

## 2024-06-01 NOTE — ED PROVIDER NOTES
[facility] ED Provider Note   Patient: Tee Escudero  MRN #:  [id number]  Date of Visit: June 1, 2024    CC:     Chief Complaint   Patient presents with    Eye Problem     HPI:  Tee Escudero is a 64 year old male who presented to the emergency department with increase pain and swelling around the right side of his face and eye.  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  He was referred to ophthalmology and apparently had a visit earlier in the day.  He was told to continue the Valtrex but was not prescribed any antibiotic medication except for erythromycin ointment.  Patient noticed that his swelling and pain increased to the point where he has not been able to sleep for the last 2 nights.  He does not have any significant discharge or mattering.  He did report some blurriness to the right eye.    Problem List:  Patient Active Problem List    Diagnosis Date Noted    Cellulitis of perineum 01/26/2024     Priority: Medium       No past medical history on file.    MEDS: HYDROcodone-acetaminophen (NORCO) 5-325 MG tablet  methylPREDNISolone (MEDROL DOSEPAK) 4 MG tablet therapy pack  chlorhexidine (HIBICLENS) 4 % liquid  Elastic Bandages & Supports (CURITY STRETCH BANDAGE) MISC  erythromycin (ROMYCIN) 5 MG/GM ophthalmic ointment  oxyCODONE (ROXICODONE) 5 MG tablet  valACYclovir (VALTREX) 1000 mg tablet        ALLERGIES:  No Known Allergies    Past Surgical History:   Procedure Laterality Date    CYSTOSCOPY, RETROGRADES, COMBINED Left 1/27/2024    Procedure: Cystoscopy, retrogrades, combined;  Surgeon: Buck Allen MD;  Location: [facility] OR    INCISION AND DRAINAGE PERINEAL, COMBINED Left 1/27/2024    Procedure: Incision and drainage perineal, combined;  Surgeon: Buck Allen MD;  Location:  OR    LASER HOLMIUM LITHOTRIPSY URETER(S), INSERT STENT, COMBINED Left 1/27/2024    Procedure: Cystoscopy, left retrograde  pyelogram, interpretation of fluoroscopic images, left ureteroscopy with thulium laser lithotripsy and stone basketing, left ureteral stent placement. Incision and drainage of abscess of the left groin and perineum 22 modifier for difficult and lengthy case;  Surgeon: Buck Allen MD;  Location: [facility] OR    SOFT TISSUE SURGERY         Social History     Tobacco Use    Smoking status: Some Days     Types: Cigarettes    Smokeless tobacco: Former     Types: Chew         Review of Systems   Except as noted in HPI, all other systems were reviewed and are negative    Physical Exam   Vitals were reviewed  Patient Vitals for the past 8 hrs:   BP Temp Temp src Pulse Resp SpO2   06/01/24 0437 (!) 142/87 97.5  F (36.4  C) Temporal 83 16 97 %   06/01/24 0206 -- -- Temporal -- 16 --     GENERAL APPEARANCE: Alert, moderate to severe distress due to pain.  FACE: Lesions noted in the supraorbital area and toward the nasal ciliary branch at the tip of the nose.  There is right periorbital swelling with difficulty opening the eyelids.  See photo below.  EYES: There is a degree of conjunctival inflammation and chemosis.  HENT: normal external exam  NECK: Significant right preauricular lymphadenopathy; no meningismus  RESP: normal respiratory effort; clear breath sounds bilaterally  CV: regular rate and rhythm; no significant murmurs, gallops or rubs  ABD: soft, no tenderness; no rebound or guarding; bowel sounds are normal  EXT: No calf tenderness or pitting edema  SKIN: See photo below  NEURO: no facial droop; no focal deficits, speech is normal            Available Lab/Imaging Results     Results for orders placed or performed during the hospital encounter of 06/01/24 (from the past 24 hour(s))   CBC with platelets differential    Narrative    The following orders were created for panel order CBC with platelets differential.  Procedure                               Abnormality         Status                     ---------                                -----------         ------                     CBC with platelets and d...[[id number]]                      Final result                 Please view results for these tests on the individual orders.   Basic metabolic panel   Result Value Ref Range    Sodium 136 135 - 145 mmol/L    Potassium 4.1 3.4 - 5.3 mmol/L    Chloride 101 98 - 107 mmol/L    Carbon Dioxide (CO2) 26 22 - 29 mmol/L    Anion Gap 9 7 - 15 mmol/L    Urea Nitrogen 13.7 8.0 - 23.0 mg/dL    Creatinine 1.60 (H) 0.67 - 1.17 mg/dL    GFR Estimate 48 (L) >60 mL/min/1.73m2    Calcium 8.8 8.8 - 10.2 mg/dL    Glucose 104 (H) 70 - 99 mg/dL   CBC with platelets and differential   Result Value Ref Range    WBC Count 6.8 4.0 - 11.0 10e3/uL    RBC Count 5.08 4.40 - 5.90 10e6/uL    Hemoglobin 15.8 13.3 - 17.7 g/dL    Hematocrit 45.8 40.0 - 53.0 %    MCV 90 78 - 100 fL    MCH 31.1 26.5 - 33.0 pg    MCHC 34.5 31.5 - 36.5 g/dL    RDW 12.4 10.0 - 15.0 %    Platelet Count 173 150 - 450 10e3/uL    % Neutrophils 56 %    % Lymphocytes 27 %    % Monocytes 14 %    % Eosinophils 3 %    % Basophils 0 %    % Immature Granulocytes 0 %    NRBCs per 100 WBC 0 <1 /100    Absolute Neutrophils 3.8 1.6 - 8.3 10e3/uL    Absolute Lymphocytes 1.8 0.8 - 5.3 10e3/uL    Absolute Monocytes 0.9 0.0 - 1.3 10e3/uL    Absolute Eosinophils 0.2 0.0 - 0.7 10e3/uL    Absolute Basophils 0.0 0.0 - 0.2 10e3/uL    Absolute Immature Granulocytes 0.0 <=0.4 10e3/uL    Absolute NRBCs 0.0 10e3/uL                Impression     Final diagnoses:   Facial pain   Herpes zoster ophthalmicus of right eye         ED Course & Medical Decision Making   Tee Ecsudero is a 64 year old male who presented to the emergency department with increasing facial pain with periorbital swelling.  Patient apparently was referred to ophthalmology after being diagnosed with possible [place] Hunt syndrome.  Symptoms started around the right ear earlier in the week, followed by a lymph node that is tender in  the preauricular area, followed by right periorbital and facial pain and a rash.  He feels that the rash may be starting to extend slightly into the left side.    Vital signs reveal a temp of 97.5, blood pressure 142/87, heart rate of 83, respirations 16, 97% oxygen saturation.  On exam, photograph reveals right periorbital swelling.  He has a few lesions over the right forehead and down toward the tip of the nose.  With the eye involvement, there is concern for herpes zoster ophthalmicus.  Patient was given Toradol and Decadron in the ED with improvement in the pain from 8 out of 10 down to 3 out of 10.  Pain was much more tolerable.  I would like to continue with prednisolone in the form of Medrol Dosepak.  Continue Valtrex.  Patient will need to go back to see the ophthalmologist in a couple of days.  Return to the emergency department at any time if symptoms worsen.          Written after-visit summary and instructions were given at the time of discharge.      Discharge Instructions:   It appears that you have a form of shingles that is affecting the eye.  Continue your Valtrex 1 g 3 times a day, and add the Medrol Dosepak.  Take Norco 1 tablet every 4 hours as needed for moderate to severe pain.  Add ibuprofen up to 600 mg every 6 hours as needed for mild to moderate pain.  Please follow-up with ophthalmologist on Monday or Tuesday as planned.  Return to the emergency department if your symptoms worsen.       Disclaimer: This note consists of words and symbols derived from keyboarding and dictation using voice recognition software.  As a result, there may be errors that have gone undetected.  Please consider this when interpreting information found in this note.       Sheeba Bradford MD  06/01/24 1810

## 2024-06-14 ENCOUNTER — OFFICE VISIT (OUTPATIENT)
Dept: FAMILY MEDICINE | Facility: OTHER | Age: 64
End: 2024-06-14

## 2024-06-14 ENCOUNTER — NURSE TRIAGE (OUTPATIENT)
Dept: NURSING | Facility: CLINIC | Age: 64
End: 2024-06-14

## 2024-06-14 VITALS
HEIGHT: 71 IN | SYSTOLIC BLOOD PRESSURE: 132 MMHG | HEART RATE: 83 BPM | WEIGHT: 212 LBS | OXYGEN SATURATION: 97 % | TEMPERATURE: 98 F | DIASTOLIC BLOOD PRESSURE: 86 MMHG | RESPIRATION RATE: 20 BRPM | BODY MASS INDEX: 29.68 KG/M2

## 2024-06-14 DIAGNOSIS — Z09 HOSPITAL DISCHARGE FOLLOW-UP: Primary | ICD-10-CM

## 2024-06-14 DIAGNOSIS — B02.21 RAMSAY HUNT SYNDROME (GENICULATE HERPES ZOSTER): ICD-10-CM

## 2024-06-14 DIAGNOSIS — H57.11 EYE PAIN, RIGHT: ICD-10-CM

## 2024-06-14 DIAGNOSIS — L03.213 PRESEPTAL CELLULITIS OF RIGHT EYE: ICD-10-CM

## 2024-06-14 PROCEDURE — 99203 OFFICE O/P NEW LOW 30 MIN: CPT | Performed by: STUDENT IN AN ORGANIZED HEALTH CARE EDUCATION/TRAINING PROGRAM

## 2024-06-14 RX ORDER — PREDNISOLONE ACETATE 10 MG/ML
1 SUSPENSION/ DROPS OPHTHALMIC
COMMUNITY
Start: 2024-06-07

## 2024-06-14 RX ORDER — LINEZOLID 600 MG/1
600 TABLET, FILM COATED ORAL EVERY 12 HOURS
COMMUNITY
Start: 2024-06-12 | End: 2024-06-17

## 2024-06-14 RX ORDER — GABAPENTIN 100 MG/1
300 CAPSULE ORAL 3 TIMES DAILY
COMMUNITY

## 2024-06-14 RX ORDER — DORZOLAMIDE HYDROCHLORIDE AND TIMOLOL MALEATE 20; 5 MG/ML; MG/ML
1 SOLUTION/ DROPS OPHTHALMIC
COMMUNITY
Start: 2024-06-07

## 2024-06-14 RX ORDER — VALACYCLOVIR HYDROCHLORIDE 500 MG/1
500 TABLET, FILM COATED ORAL 3 TIMES DAILY
COMMUNITY
Start: 2024-06-07 | End: 2024-06-28

## 2024-06-14 ASSESSMENT — PAIN SCALES - GENERAL: PAINLEVEL: EXTREME PAIN (8)

## 2024-06-14 NOTE — PROGRESS NOTES
Assessment & Plan     Hospital discharge follow-up  Preseptal cellulitis of right eye  Ngozi Hunt syndrome (geniculate herpes zoster)  Eye pain, right  Patient with recent hospitalization for right preseptal cellulitis along with herpes zoster/[place] syndrome over her is right face/eye.  During his hospitalization infectious disease was involved with his care, currently on linezolid 600 twice a day, Valtrex 500 - 3 times a day as well as is on ophthalmic prednisone and timolol.  He has had no visual impairment as of now, previous swelling did swell his eye shut to the point of not having any vision but since swelling has gone down vision has returned back to normal.  Still does have some pain, currently taking gabapentin and over-the-counter Tylenol/ibuprofen.  Continue with these.  He also does have a ophthalmology appointment later this morning.  Continue his current medications without change including antiviral and antibiotic.  Worrisome signs and symptoms discussed      MED REC REQUIRED  Post Medication Reconciliation Status: discharge medications reconciled, continue medications without change            Subjective   Tee is a 64 year old, presenting for the following health issues:  Hospital F/U        6/14/2024     7:25 AM   Additional Questions   Roomed by Carmen     History of Present Illness       Reason for visit:  Swelling in right eye,bad headace    He eats 0-1 servings of fruits and vegetables daily.He consumes 3 sweetened beverage(s) daily.He exercises with enough effort to increase his heart rate 9 or less minutes per day.  He exercises with enough effort to increase his heart rate 3 or less days per week.   He is taking medications regularly.           Hospital Follow-up Visit:    Hospital/Nursing Home/IP Rehab Facility:  [facility]   Date of Admission: 6/2/24  Date of Discharge: 6/5/24  Reason(s) for Admission: Preseptal cellulitis   Was the patient in the ICU or did  "the patient experience delirium during hospitalization?  No  Do you have any other stressors you would like to discuss with your provider? No    Problems taking medications regularly:  None  Medication changes since discharge: Yes  Problems adhering to non-medication therapy:  None    Summary of hospitalization:  CareEverywhere information obtained and reviewed  Diagnostic Tests/Treatments reviewed.  Follow up needed: Ophthalmology  Other Healthcare Providers Involved in Patient s Care:          Ophthalmology and infectious disease  Update since discharge: improved.         Plan of care communicated with patient               Review of Systems  Constitutional, HEENT, cardiovascular, pulmonary, gi and gu systems are negative, except as otherwise noted.      Objective    /86   Pulse 83   Temp 98  F (36.7  C) (Temporal)   Resp 20   Ht 1.803 m (5' 11\")   Wt 96.2 kg (212 lb)   SpO2 97%   BMI 29.57 kg/m    Body mass index is 29.57 kg/m .  Physical Exam  Vitals and nursing note reviewed.   Constitutional:       General: He is not in acute distress.     Appearance: Normal appearance. He is not ill-appearing, toxic-appearing or diaphoretic.   HENT:      Head: Normocephalic and atraumatic.      Right Ear: Tympanic membrane, ear canal and external ear normal. There is no impacted cerumen.      Left Ear: Tympanic membrane, ear canal and external ear normal. There is no impacted cerumen.      Nose: Nose normal. No congestion or rhinorrhea.      Mouth/Throat:      Mouth: Mucous membranes are moist.      Pharynx: Oropharynx is clear. No oropharyngeal exudate or posterior oropharyngeal erythema.   Eyes:      General:         Right eye: No discharge.         Left eye: No discharge.      Extraocular Movements: Extraocular movements intact.      Conjunctiva/sclera: Conjunctivae normal.      Pupils: Pupils are equal, round, and reactive to light.   Cardiovascular:      Rate and Rhythm: Normal rate and regular rhythm.      " Heart sounds: No murmur heard.  Pulmonary:      Effort: Pulmonary effort is normal. No respiratory distress.      Breath sounds: Normal breath sounds.   Musculoskeletal:         General: Normal range of motion.      Cervical back: Normal range of motion.   Lymphadenopathy:      Cervical: No cervical adenopathy.   Skin:     Comments: Diffuse swelling over the right side of face including upper and lower eyelid, right injected.  Slightly tender to palpation over the right cheek   Neurological:      Mental Status: He is alert.   Psychiatric:         Mood and Affect: Mood normal.         Behavior: Behavior normal.         Thought Content: Thought content normal.                Signed Electronically by: ADRIANO PACHECO MD

## 2024-06-14 NOTE — TELEPHONE ENCOUNTER
Nurse Triage SBAR    Is this a 2nd Level Triage? NO    Situation: facial swelling    Background: Patient complains of headaches, facial swelling and pain around his R eye for approximately 3 weeks. Patient denies redness or fever. Patient states that he did have a rash on one side of his head. Patient was seen in [facility] ED on 6/13/24. Previously diagnosed with preseptal cellulitis.     Assessment: rash, facial swelling around eye, headache, afebrile    Protocol Recommended Disposition:   See PCP Within 24 Hours, See More Appropriate Guideline    Recommendation: Patient does have an appointment today with an eye doctor and is encourage to keep this appointment. Patient is also encouraged to establish care with a primary provider for this issue. Patient verbalized understanding of care advice.       Annette Carmona RN on 6/14/2024 at 6:28 AM      Does the patient meet one of the following criteria for ADS visit consideration? No    Reason for Disposition   Swelling mainly around the eyes   Face swelling is painful to touch    Additional Information   Negative: [1] Life-threatening reaction (anaphylaxis) in the past to similar substance (e.g., food, insect bite/sting, chemical, etc.) AND [2] < 2 hours since exposure   Negative: Unresponsive, passed out or very weak   Negative: Swollen tongue   Negative: Difficulty breathing or wheezing   Negative: Sounds like a life-threatening emergency to the triager   Negative: [1] SEVERE swelling of entire face AND [2] < 2 hours since exposure to high-risk allergen (e.g., peanuts, tree nuts, fish, shellfish or 1st dose of drug) AND [3] no serious symptoms AND [4] no serious allergic reaction in the past   Negative: Fever   Negative: Unresponsive, passed out or very weak   Negative: Difficulty breathing or wheezing   Negative: [1] Difficulty swallowing or slurred speech AND [2] sudden onset   Negative: [1] SEVERE eyelid swelling (i.e., shut or almost) AND [2] fever    "Negative: [1] Eyelid (outer) is very red AND [2] fever   Negative: Patient sounds very sick or weak to the triager   Negative: [1] Pregnant 20 or more weeks AND [2] sudden weight gain (e.g., more than 3 lbs or 1.4 kg in one week)   Negative: [1] Postpartum (from 0 to 6 weeks after delivery) AND [2] sudden weight gain (e.g., more than 3 lbs or 1.4 kg in one week)   Negative: Taking an ACE Inhibitor medicine (e.g., benazepril / LOTENSIN, captopril / CAPOTEN, enalapril / VASOTEC, lisinopril / ZESTRIL)   Negative: Patient sounds very sick or weak to the triager   Negative: Pregnant 20 or more weeks   Negative: Postpartum (from 0 to 6 weeks after delivery)   Negative: SEVERE swelling of the entire face   Negative: [1] Swelling is red AND [2] very painful to touch   Negative: Face swelling began after taking a drug   Negative: [1] Looks infected AND [2] large red area (> 2 in. or 5 cm)   Negative: [1] Painful rash AND [2] multiple small blisters grouped together (i.e., dermatomal distribution or \"band\" or \"stripe\")   Negative: Toothache   Negative: Swelling of both lower legs (i.e., bilateral pedal edema)   Negative: Widespread rash on body    Protocols used: Face Swelling-A-AH, Eye - Swelling-A-AH    "

## 2024-06-28 ENCOUNTER — HOSPITAL ENCOUNTER (EMERGENCY)
Facility: CLINIC | Age: 64
Discharge: HOME | End: 2024-06-28
Attending: FAMILY MEDICINE | Admitting: FAMILY MEDICINE

## 2024-06-28 VITALS
OXYGEN SATURATION: 100 % | SYSTOLIC BLOOD PRESSURE: 140 MMHG | DIASTOLIC BLOOD PRESSURE: 98 MMHG | RESPIRATION RATE: 18 BRPM | TEMPERATURE: 97.2 F | WEIGHT: 209.1 LBS | BODY MASS INDEX: 29.16 KG/M2 | HEART RATE: 67 BPM

## 2024-06-28 DIAGNOSIS — B02.30 HERPES ZOSTER WITH OPHTHALMIC COMPLICATION, UNSPECIFIED HERPES ZOSTER EYE DISEASE: ICD-10-CM

## 2024-06-28 DIAGNOSIS — L03.315 CELLULITIS OF PERINEUM: ICD-10-CM

## 2024-06-28 PROCEDURE — 99284 EMERGENCY DEPT VISIT MOD MDM: CPT | Performed by: FAMILY MEDICINE

## 2024-06-28 RX ORDER — VALACYCLOVIR HYDROCHLORIDE 1 G/1
TABLET, FILM COATED ORAL
Qty: 35 TABLET | Refills: 0 | Status: SHIPPED | OUTPATIENT
Start: 2024-06-28

## 2024-06-28 RX ORDER — HYDROCODONE BITARTRATE AND ACETAMINOPHEN 5; 325 MG/1; MG/1
1 TABLET ORAL EVERY 6 HOURS PRN
Qty: 20 TABLET | Refills: 0 | Status: SHIPPED | OUTPATIENT
Start: 2024-06-28 | End: 2024-07-05

## 2024-06-28 ASSESSMENT — COLUMBIA-SUICIDE SEVERITY RATING SCALE - C-SSRS
2. HAVE YOU ACTUALLY HAD ANY THOUGHTS OF KILLING YOURSELF IN THE PAST MONTH?: NO
6. HAVE YOU EVER DONE ANYTHING, STARTED TO DO ANYTHING, OR PREPARED TO DO ANYTHING TO END YOUR LIFE?: NO
1. IN THE PAST MONTH, HAVE YOU WISHED YOU WERE DEAD OR WISHED YOU COULD GO TO SLEEP AND NOT WAKE UP?: NO

## 2024-06-28 NOTE — ED PROVIDER NOTES
ED Provider Note     Tee Escudero  [id number]  June 28, 2024      CC:     Chief Complaint   Patient presents with    Eye Problem          History is obtained from the patient.    HPI: Tee Escudero is a 64 year old male presenting with suspected recurrence of his herpes zoster involving the face and eye.  Patient has had a long course of having had facial cellulitis, preseptal cellulitis, and suspected [place] Hunt syndrome versus herpes zoster ophthalmicus.  Patient was hospitalized at [facility] about 3 weeks ago with preseptal cellulitis.  He was given IV antibiotics and discharged on p.o. linezolid in addition to his Valtrex.  Patient also has seen an ophthalmologist during this time.  He went to have a recheck today and states that the last 3 to 4 days he has had recurrence of headaches, pain around the eye and nose, and redness now of the eye.  His eye specialist sent him to the ED with concerns for possible persistent versus recurrence of the zoster infection.  Patient does not have any open sores except at the tip of the nose which has not completely healed.  He noticed that there appears to be some more swelling now.  He does not have any lymph node swelling over the preauricular region like he did before.  Vision is not affected.  Patient wants to be able to get back to work.      PMH/Problem List:   No past medical history on file.    PSH:   Past Surgical History:   Procedure Laterality Date    CYSTOSCOPY, RETROGRADES, COMBINED Left 1/27/2024    Procedure: Cystoscopy, retrogrades, combined;  Surgeon: Buck Allen MD;  Location:  OR    INCISION AND DRAINAGE PERINEAL, COMBINED Left 1/27/2024    Procedure: Incision and drainage perineal, combined;  Surgeon: Buck Allen MD;  Location:  OR    LASER HOLMIUM LITHOTRIPSY URETER(S), INSERT STENT, COMBINED Left 1/27/2024    Procedure: Cystoscopy, left retrograde pyelogram,  interpretation of fluoroscopic images, left ureteroscopy with thulium laser lithotripsy and stone basketing, left ureteral stent placement. Incision and drainage of abscess of the left groin and perineum 22 modifier for difficult and lengthy case;  Surgeon: Buck Allen MD;  Location: [facility] OR    SOFT TISSUE SURGERY         MEDS:   No current facility-administered medications for this encounter.     Current Outpatient Medications   Medication Sig Dispense Refill    HYDROcodone-acetaminophen (NORCO) 5-325 MG tablet Take 1 tablet by mouth every 6 hours as needed for severe pain 20 tablet 0    valACYclovir (VALTREX) 1000 mg tablet Take 1 tablet 3 times a day for 1 week, followed by 1 tablet daily for 3 more weeks. 35 tablet 0    dorzolamide-timolol (COSOPT) 2-0.5 % ophthalmic solution Apply 1 drop to eye      Elastic Bandages & Supports (CURITY STRETCH BANDAGE) MISC 1 each 2 times daily (Patient not taking: Reported on 6/14/2024) 24 each 1    gabapentin (NEURONTIN) 100 MG capsule Take 300 mg by mouth 3 times daily      oxyCODONE (ROXICODONE) 5 MG tablet Take 1 tablet (5 mg) by mouth every 6 hours as needed for severe pain (Patient not taking: Reported on 6/14/2024) 12 tablet 0    prednisoLONE acetate (PRED FORTE) 1 % ophthalmic suspension Apply 1 drop to eye         Allergies: Patient has no known allergies.    Triage and nursing notes were reviewed.    ROS: All other systems were reviewed and are negative    Physical Exam:  Vitals:    06/28/24 1111   BP: (!) 140/98   Pulse: 67   Resp: 18   Temp: 97.2  F (36.2  C)   TempSrc: Temporal   SpO2: 100%   Weight: 94.8 kg (209 lb 1.6 oz)     GENERAL APPEARANCE: Alert, pleasant and joking, no acute respiratory distress  HEAD: atraumatic; slight erythema over the right eyebrow and forehead.  There is some mild swelling in the periorbital region.  Patient has some swelling of the lids but he is able to open his eyes slightly.  EYES: Conjunctival injection, extraocular  muscles are intact.  HENT: See photo below  NECK: no adenopathy or masses,  RESP: lungs clear to auscultation - no rales, rhonchi or wheezes  CV: regular rate and rhythm, no significant murmurs or rubs  SKIN: See photo below  NEURO: Patient has sensitivity to light touch over the eyebrow area and forehead          Labs/Imaging Results:  No results found for this or any previous visit (from the past 24 hour(s)).        Impression:  Final diagnoses:   Herpes zoster with ophthalmic complication   Cellulitis of perineum         ED Course & Medical Decision Making (Plan):  Tee Escudero is a 64 year old male with suspected recurrent herpes zoster involving the face and right eye.  Patient was diagnosed with [place] Hunt syndrome versus facial herpes zoster.  Patient saw the eye specialist today, and started to have recurrence of symptoms 3 days ago.  There is concerned that he may be developing either persistent or recurrent shingles infection.  Patient had completed his Valtrex about 5 days ago.  He is noticing some increased swelling.  The lesion on the tip of the nose has been there the entire time.  Otherwise the other lesions have been improving.  He had been diagnosed with preseptal cellulitis and completed his oral antibiotics.  His eye specialist wanted him to to be seen and started back on the Valtrex.    Vital signs reveal a temp of 97.2, blood pressure 140/98, heart rate of 67, respiration 18, 100% oxygen saturation.  On exam, there is some conjunctival injection.  There is mild lid swelling.  Skin is intact without any open wounds.  Slight lesion at the tip of the nose that is not draining.    Patient may have recurrent versus persistent herpes zoster.  Restart Valtrex 3 times a day for 1 week, followed by 1 tablet daily for 3 more weeks.  Referral to primary care was made.  I like to have the patient rechecked within 1 to 2 weeks.  He has an appointment with ophthalmology next Tuesday.  He was started back  on prednisolone drops.  Patient was given a prescription for 20 tablets of Norco to take sparingly.  I like him to take his gabapentin 100 mg capsules, 2 to 3 tablets at nighttime to treat possible postherpetic neuralgia.        Written after-visit summary and instructions were given at the time of discharge.          Follow Up Plan:  Primary care provider    In 2 weeks      [facility] Emergency Dept  [address] [place] [id number] [phone]    If symptoms worsen        Discharge Instructions:  It is possible that you are having a recurrence of your herpes zoster.  Your symptoms could also be due to postherpetic neuralgia, nerve inflammation after shingles.  Go back on the Valtrex and take it 3 times a day for the first week, followed by 1 tablet daily for 3 more weeks.  Keep your appointment with the eye specialist next week.  Please follow-up with a primary care provider in the clinic in 2 weeks for recheck.  Please establish care with a primary provider.  Return to the emergency department if your symptoms worsen.        Disclaimer: This note consists of words and symbols derived from keyboarding and dictation using voice recognition software.  As a result, there may be errors that have gone undetected.  Please consider this when interpreting information found in this note.       Sheeba Bradford MD  06/28/24 [id number]

## 2024-06-28 NOTE — ED TRIAGE NOTES
Patient presents with R eye pain, redness, and difficulty opening eye. Patient states that he has been in and out of hospital for this issue the last 4 weeks. Referred by eye doctor. Thinks that shingles is reoccurring.

## 2024-06-28 NOTE — DISCHARGE INSTRUCTIONS
It is possible that you are having a recurrence of your herpes zoster.  Your symptoms could also be due to postherpetic neuralgia, nerve inflammation after shingles.  Go back on the Valtrex and take it 3 times a day for the first week, followed by 1 tablet daily for 3 more weeks.  Keep your appointment with the eye specialist next week.  Please follow-up with a primary care provider in the clinic in 2 weeks for recheck.  Please establish care with a primary provider.  Return to the emergency department if your symptoms worsen.

## 2024-07-28 ENCOUNTER — HEALTH MAINTENANCE LETTER (OUTPATIENT)
Age: 64
End: 2024-07-28

## 2024-09-06 ENCOUNTER — TRANSFERRED RECORDS (OUTPATIENT)
Dept: HEALTH INFORMATION MANAGEMENT | Facility: CLINIC | Age: 64
End: 2024-09-06

## 2024-09-10 ENCOUNTER — TRANSCRIBE ORDERS (OUTPATIENT)
Dept: OTHER | Age: 64
End: 2024-09-10

## 2024-09-10 DIAGNOSIS — H20.10 CHRONIC UVEITIS: Primary | ICD-10-CM

## 2024-10-14 ENCOUNTER — OFFICE VISIT (OUTPATIENT)
Dept: OPHTHALMOLOGY | Facility: CLINIC | Age: 64
End: 2024-10-14
Attending: OPTOMETRIST

## 2024-10-14 DIAGNOSIS — B02.32 HERPES ZOSTER ANTERIOR UVEITIS: Primary | ICD-10-CM

## 2024-10-14 DIAGNOSIS — H40.051 OCULAR HYPERTENSION, RIGHT EYE: ICD-10-CM

## 2024-10-14 PROCEDURE — 99203 OFFICE O/P NEW LOW 30 MIN: CPT | Performed by: OPHTHALMOLOGY

## 2024-10-14 PROCEDURE — 99213 OFFICE O/P EST LOW 20 MIN: CPT | Performed by: OPHTHALMOLOGY

## 2024-10-14 RX ORDER — PREDNISOLONE ACETATE 10 MG/ML
1 SUSPENSION/ DROPS OPHTHALMIC 3 TIMES DAILY
Qty: 10 ML | Refills: 5 | Status: SHIPPED | OUTPATIENT
Start: 2024-10-14

## 2024-10-14 RX ORDER — DORZOLAMIDE HYDROCHLORIDE AND TIMOLOL MALEATE 20; 5 MG/ML; MG/ML
1 SOLUTION/ DROPS OPHTHALMIC 3 TIMES DAILY
Qty: 10 ML | Refills: 5 | Status: SHIPPED | OUTPATIENT
Start: 2024-10-14

## 2024-10-14 RX ORDER — VALACYCLOVIR HYDROCHLORIDE 1 G/1
1000 TABLET, FILM COATED ORAL 3 TIMES DAILY
Qty: 90 TABLET | Refills: 1 | Status: SHIPPED | OUTPATIENT
Start: 2024-10-14

## 2024-10-14 ASSESSMENT — TONOMETRY
IOP_METHOD: TONOPEN
OD_IOP_MMHG: 29
OS_IOP_MMHG: 18
IOP_METHOD: APP BY JY
OD_IOP_MMHG: 19
IOP_METHOD: APPLANATION
OD_IOP_MMHG: 42

## 2024-10-14 ASSESSMENT — VISUAL ACUITY
OD_PH_SC: 20/30
METHOD: SNELLEN - LINEAR
OD_SC+: -1
OS_SC+: -2
OS_SC: 20/30
OD_SC: 20/40

## 2024-10-14 ASSESSMENT — CUP TO DISC RATIO
OS_RATIO: 0.5
OD_RATIO: 0.4

## 2024-10-14 ASSESSMENT — EXTERNAL EXAM - LEFT EYE: OS_EXAM: NORMAL

## 2024-10-14 ASSESSMENT — CONF VISUAL FIELD
OD_SUPERIOR_NASAL_RESTRICTION: 0
OS_NORMAL: 1
OD_INFERIOR_TEMPORAL_RESTRICTION: 0
OD_SUPERIOR_TEMPORAL_RESTRICTION: 0
OD_INFERIOR_NASAL_RESTRICTION: 0
OS_SUPERIOR_TEMPORAL_RESTRICTION: 0
OD_NORMAL: 1
OS_SUPERIOR_NASAL_RESTRICTION: 0
OS_INFERIOR_TEMPORAL_RESTRICTION: 0
OS_INFERIOR_NASAL_RESTRICTION: 0
METHOD: COUNTING FINGERS

## 2024-10-14 ASSESSMENT — SLIT LAMP EXAM - LIDS: COMMENTS: NORMAL

## 2024-10-14 ASSESSMENT — EXTERNAL EXAM - RIGHT EYE: OD_EXAM: NORMAL

## 2024-10-14 NOTE — NURSING NOTE
Chief Complaints and History of Present Illnesses   Patient presents with    Uveitis Evaluation     Chief Complaint(s) and History of Present Illness(es)       Uveitis Evaluation              Laterality: right eye    Onset: gradual    Onset: months ago    Quality: Va is blurry in the right eye       Severity: moderate    Frequency: intermittently    Associated symptoms: redness, headache (have decreased), photophobia and floaters (more at night).  Negative for flashes    Treatments tried: no treatments    Pain scale: 0/10              Comments    In May shingles and cellulitis on the right side of the face.  Still has pressure behind the right eye   Has discontinue the gtts last taken about 3 nights ago.   Valacyclovir discontinue last taken about 3 weeks ago  Yamilet Valdovinos COT 8:57 AM October 14, 2024

## 2024-10-14 NOTE — PROGRESS NOTES
Chief Complaint/Presenting Concern: Uveitis evaluation     History of Present Illness:   Tee Escudero is a 64 year old patient who presents for evaluation of zoster anterior uveitis of the right eye. This has been present since this Shingles in May 2024 occurred around the right eye     Mr. Escudero has been treated with oral antiviral tablets and drops. He has been dealing with uveitis of a few months with various frequencies of drops and antiviral pills  but this has not resolved. Now off drops for a few weeks and things are getting a little uncomfortable right eye. 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    Relevant Review of Systems:Headaches right eye, some nerve sensation around eye brow right side.    Labs: June 2024: CBC WNL and BMP WNL other than elevated creatinine 1.26     Current eye related medications: No pink or blue to drops or Valtrex for three weeks    Retina/Uveitis Imaging: None today    Assessment:    1. 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 Ocular hypertension, right eye  Elevated today likely related to Zoster and inflammation    Plan/Recommendations:  Discussed findings with patient. The right eye still has active inflammation related to the Shingles virus. We discussed frequent use of pill and drops now to help get things improved and then later down to maintenance doses which may be needed for months to years.  Eye pressure was up to 42 right eye in the  right eye and improved down to 19 after drops. Gonioscopy was normal so this is also likely related to inflammation from the Shingles Virus. As below, we will keep up regular pressure lowering drops  We deferred dilation today IOP was elevated but things were normal on undilated exam  No lab testing as time  For the antiviral pill (Valacyclovir), take 1000 mg three times a day with food  For the pink top (prednisolone), please use 3x/day right eye  For the blue top (Dorzolamide-timolol), please use 3x/day right eye   No drops needed left eye   You can try these over the counter eye drops if something gets in your eye    RTC Dr. De Souza in 2-3 weeks. IF things are doing better, recommend going down on antiviral pill and drops to 2x/day and keep things stable for 2-3 weeks. Then if still doing well, go down to once daily on all treatments and continue unchanged.     Lisa  here in early-mid December (or sooner PRN) Tonopen, AC Check, then plan to dilate right eye     Physician Attestation     Attending Physician Attestation:  Complete documentation of historical and exam elements from today's encounter can be found in the full encounter summary report (not reduplicated in this progress note). I personally obtained the chief complaint(s) and history of present illness. I confirmed and edited as necessary the review of systems, past medical/surgical history, family history, social history, and examination findings as documented by others; and I examined the patient myself. I personally reviewed the relevant tests, images, and reports as documented above. I formulated and edited as necessary the assessment and plan and discussed the findings and management plan with the patient and any family members present at the time of the visit.  Bobo Gonzalez M.D., Uveitis and Medical Retina, October 14, 2024

## 2024-10-14 NOTE — LETTER
10/14/2024       RE: Tee Escudero  [address] [facility] [address]     Dear Colleague,    Thank you for referring your patient, Tee Escudero, to the [facility] [place] at [facility]. Please see a copy of my visit note below.    Chief Complaint/Presenting Concern: Uveitis evaluation     History of Present Illness:   Tee Escudero is a 64 year old patient who presents for evaluation of zoster anterior uveitis of the right eye. This has been present since this Shingles in May 2024 occurred around the right eye     Mr. Escudero has been treated with oral antiviral tablets and drops. He has been dealing with uveitis of a few months with various frequencies of drops and antiviral pills  but this has not resolved. Now off drops for a few weeks and things are getting a little uncomfortable right eye. 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    Relevant Review of Systems:Headaches right eye, some nerve sensation around eye brow right side.    Labs: June 2024: CBC WNL and BMP WNL other than elevated creatinine 1.26     Current eye related medications: No pink or blue to drops or Valtrex for three weeks    Retina/Uveitis Imaging: None today    Assessment:    1. 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 Ocular hypertension, right eye  Elevated today likely related to Zoster and  inflammation    Plan/Recommendations:  Discussed findings with patient. The right eye still has active inflammation related to the Shingles virus. We discussed frequent use of pill and drops now to help get things improved and then later down to maintenance doses which may be needed for months to years.  Eye pressure was up to 42 right eye in the right eye and improved down to 19 after drops. Gonioscopy was normal so this is also likely related to inflammation from the Shingles virus. As below, we will keep up regular pressure lowering drops  We deferred dilation today IOP was elevated but things were normal on undilated exam  No lab testing as time  For the antiviral pill (Valacyclovir), take 1000 mg three times a day with food  For the pink top (prednisolone), please use 3x/day right eye  For the blue top (Dorzolamide-timolol), please use 3x/day right eye   No drops needed left eye   You can try these over the counter eye drops if something gets in your eye    RTC Dr. De Souza in 2-3 weeks. IF things are doing better, recommend going down on antiviral pill and drops to 2x/day and keep things stable for 2-3 weeks. Then if still doing well, go down to once daily on all treatments and continue unchanged.     Lisa  here in early-mid December (or sooner PRN) PAYAL [place] Check, then plan to dilate right eye     Physician Attestation    Attending Physician Attestation:  Complete documentation of historical and exam elements from today's encounter can be found in the full encounter summary report (not reduplicated in this progress note). I personally obtained the chief complaint(s) and history of present illness. I confirmed and edited as necessary the review of systems, past medical/surgical history, family history, social history, and examination findings as documented by others; and I examined the patient myself. I personally reviewed the relevant tests, images, and reports as documented above. I formulated and  edited as necessary the assessment and plan and discussed the findings and management plan with the patient and any family members present at the time of the visit.  Bobo Gonzalez M.D., Uveitis and Medical Retina, October 14, 2024     Again, thank you for allowing me to participate in the care of your patient.      Sincerely,    Bobo Gonzalez MD  Uveitis and Medical Retina    Department of Ophthalmology & Visual Neurosciences  [facility]

## 2024-10-14 NOTE — PATIENT INSTRUCTIONS
For the antiviral pill (Valacyclovir), take 1000 mg three times a day with food  For the pink top (prednisolone), please use 3x/day right eye  For the blue top (Dorzolamide-timolol), please use 3x/day right eye   No drops needed left eye   You can try these over the counter eye drops if something gets in your eye    RTC Dr. De Souza in 2-3 weeks. IF things are doing better, recommend going down on antiviral pill and drops to 2x/day and keep things stable for 2-3 weeks. Then if still doing well, go down to once daily on all treatments and continue unchanged.

## 2025-01-10 ENCOUNTER — OFFICE VISIT (OUTPATIENT)
Dept: FAMILY MEDICINE | Facility: CLINIC | Age: 65
End: 2025-01-10
Payer: MEDICAID

## 2025-01-10 VITALS
RESPIRATION RATE: 16 BRPM | WEIGHT: 220 LBS | OXYGEN SATURATION: 98 % | HEART RATE: 78 BPM | DIASTOLIC BLOOD PRESSURE: 88 MMHG | HEIGHT: 69 IN | BODY MASS INDEX: 32.58 KG/M2 | TEMPERATURE: 97.9 F | SYSTOLIC BLOOD PRESSURE: 130 MMHG

## 2025-01-10 DIAGNOSIS — Z12.11 SCREEN FOR COLON CANCER: ICD-10-CM

## 2025-01-10 DIAGNOSIS — B02.21 RAMSAY HUNT SYNDROME (GENICULATE HERPES ZOSTER): ICD-10-CM

## 2025-01-10 DIAGNOSIS — H66.016 RECURRENT ACUTE SUPPURATIVE OTITIS MEDIA WITH SPONTANEOUS RUPTURE OF BOTH TYMPANIC MEMBRANES: ICD-10-CM

## 2025-01-10 DIAGNOSIS — B02.29 HZV (HERPES ZOSTER VIRUS) POST HERPETIC NEURALGIA: Primary | ICD-10-CM

## 2025-01-10 PROCEDURE — 99214 OFFICE O/P EST MOD 30 MIN: CPT | Performed by: STUDENT IN AN ORGANIZED HEALTH CARE EDUCATION/TRAINING PROGRAM

## 2025-01-10 PROCEDURE — 3075F SYST BP GE 130 - 139MM HG: CPT | Performed by: STUDENT IN AN ORGANIZED HEALTH CARE EDUCATION/TRAINING PROGRAM

## 2025-01-10 PROCEDURE — 1125F AMNT PAIN NOTED PAIN PRSNT: CPT | Performed by: STUDENT IN AN ORGANIZED HEALTH CARE EDUCATION/TRAINING PROGRAM

## 2025-01-10 PROCEDURE — 3079F DIAST BP 80-89 MM HG: CPT | Performed by: STUDENT IN AN ORGANIZED HEALTH CARE EDUCATION/TRAINING PROGRAM

## 2025-01-10 RX ORDER — OFLOXACIN 3 MG/ML
5 SOLUTION AURICULAR (OTIC) DAILY
Qty: 10 ML | Refills: 0 | Status: SHIPPED | OUTPATIENT
Start: 2025-01-10 | End: 2025-01-17

## 2025-01-10 RX ORDER — GABAPENTIN 300 MG/1
300 CAPSULE ORAL 3 TIMES DAILY
Qty: 90 CAPSULE | Refills: 1 | Status: SHIPPED | OUTPATIENT
Start: 2025-01-10 | End: 2025-02-07

## 2025-01-10 ASSESSMENT — PATIENT HEALTH QUESTIONNAIRE - PHQ9
SUM OF ALL RESPONSES TO PHQ QUESTIONS 1-9: 10
10. IF YOU CHECKED OFF ANY PROBLEMS, HOW DIFFICULT HAVE THESE PROBLEMS MADE IT FOR YOU TO DO YOUR WORK, TAKE CARE OF THINGS AT HOME, OR GET ALONG WITH OTHER PEOPLE: NOT DIFFICULT AT ALL
SUM OF ALL RESPONSES TO PHQ QUESTIONS 1-9: 10

## 2025-01-10 ASSESSMENT — PAIN SCALES - GENERAL: PAINLEVEL_OUTOF10: SEVERE PAIN (7)

## 2025-01-10 NOTE — PROGRESS NOTES
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  Postherpetic neuralgia likely contributing to his discomfort and we did discuss options for this.  He is interested in doing trial of gabapentin now and see if this gives him some relief.  Will continue on antivirals and follow-up with ophthalmology as well as ENT.  Follow-up sooner if new or worsening issues arise.        Robby Oliveira is a 64 year old, presenting for the following health issues:  Shingles (Follow up from having shingles that started 5/31/24-headaches, eyes drooping, losing vision)        1/10/2025     9:49 AM   Additional Questions   Roomed by Velma     History of Present Illness       Headaches:   Since the patient's last clinic visit, headaches are: worsened  The patient is getting headaches:  Every day  He is not able to do normal daily activities when he has a migraine.  The patient is taking the following rescue/relief medications:  Ibuprofen (Advil, Motrin) and Tylenol   Patient states \"I get only a small amount of relief\" from the rescue/relief medications.   The patient is taking the following medications to prevent migraines:  No medications to prevent migraines  In the past 4 weeks, the patient has gone to an Urgent Care or Emergency Room 0 times times due to headaches.    Reason for visit:  My right eye and bad headaches    He eats 2-3 servings of fruits and vegetables daily.He consumes 3 sweetened beverage(s) daily.He exercises with enough effort to increase his heart rate 9 or less " "minutes per day.  He exercises with enough effort to increase his heart rate 3 or less days per week. He is missing 1 dose(s) of medications per week.       Patient with unfortunate shingles involving his eye and ear following with ophthalmology now.  Patient with significant ear pain and has had some drainage.  Hearing off with this.  Has been on antivirals she thinks are helpful but continued pain worse on the right side of his face with burning.  Rash has improved.  No jaw claudication.  Temple soreness.  No sore throat.      Review of Systems  Constitutional, HEENT, cardiovascular, pulmonary, GI, , musculoskeletal, neuro, skin, endocrine and psych systems are negative, except as otherwise noted.      Objective    /88   Pulse 78   Temp 97.9  F (36.6  C) (Temporal)   Resp 16   Ht 1.753 m (5' 9\")   Wt 99.8 kg (220 lb)   SpO2 98%   BMI 32.49 kg/m    Body mass index is 32.49 kg/m .  Physical Exam   GENERAL: alert and no distress  EYES: Eyes grossly normal to inspection, PERRL and conjunctivae and sclerae normal  HENT: Bilateral TM rupture with redness and drainage  NECK: no adenopathy, no asymmetry, masses, or scars  RESP: lungs clear to auscultation - no rales, rhonchi or wheezes  CV: regular rate and rhythm, normal S1 S2, no S3 or S4, no murmur, click or rub, no peripheral edema  ABDOMEN: soft, nontender, no hepatosplenomegaly, no masses and bowel sounds normal  MS: no gross musculoskeletal defects noted, no edema  SKIN: no suspicious lesions or rashes  NEURO: Normal strength and tone, mentation intact and speech normal  PSYCH: mentation appears normal, affect normal/bright          Signed Electronically by: Tyler Ang MD    "

## 2025-01-23 NOTE — PROGRESS NOTES
ENT Consultation    Tee Escudero who is a 65 year old male seen in consultation at the request of Dr. Tyler Ang.      History of Present Illness - Tee Escudero is a 65 year old male patient with apparent prior history of ear disease may have had surgery on his left ear in the past presents because of persistent discharge from his left ear for over a year.  Has apparently had problems with the right as well.  He recently had shingles on the right side of his face but the problem is the left ear.  He feels that the hearing has really dropped especially lately.  He had drains in the mid and purulent material on the left.  He has been on some oral antibiotics off-and-on that helps a little bit.  He experienced some pressure discomfort on the left but not the right.  He reported some dizziness in the past but not vertigo at this point.  He endorses bilateral tinnitus nonpulsatile constant.          BP Readings from Last 1 Encounters:   02/03/25 136/84       BP noted to be well controlled today in office.     Tee IS a smoker/uses chewing tobacco.  Tee is not ready to quit      Past Medical History -   Past Medical History:   Diagnosis Date    Uveitis        Current Medications -   Current Outpatient Medications:     dorzolamide-timolol (COSOPT) 2-0.5 % ophthalmic solution, Place 1 drop into the right eye 3 times daily., Disp: 10 mL, Rfl: 5    prednisoLONE acetate (PRED FORTE) 1 % ophthalmic suspension, Place 1 drop into the right eye 3 times daily., Disp: 10 mL, Rfl: 5    valACYclovir (VALTREX) 1000 mg tablet, Take 1 tablet (1,000 mg) by mouth 3 times daily., Disp: 90 tablet, Rfl: 1    Elastic Bandages & Supports (CURITY STRETCH BANDAGE) MISC, 1 each 2 times daily (Patient not taking: Reported on 6/14/2024), Disp: 24 each, Rfl: 1    gabapentin (NEURONTIN) 300 MG capsule, Take 1 capsule (300 mg) by mouth 3 times daily. (Patient not taking: Reported on 2/3/2025), Disp: 90 capsule, Rfl: 1    oxyCODONE  (ROXICODONE) 5 MG tablet, Take 1 tablet (5 mg) by mouth every 6 hours as needed for severe pain (Patient not taking: Reported on 6/14/2024), Disp: 12 tablet, Rfl: 0    Allergies - No Known Allergies    Social History -   Social History     Socioeconomic History    Marital status: Single   Tobacco Use    Smoking status: Some Days     Types: Cigarettes    Smokeless tobacco: Former     Types: Chew   Vaping Use    Vaping status: Never Used     Social Drivers of Health     Food Insecurity: Food Insecurity Present (6/3/2024)    [facility]    Hunger Vital Sign     Worried About Running Out of Food in the Last Year: Sometimes true     Ran Out of Food in the Last Year: Often true   Transportation Needs: No Transportation Needs (6/3/2024)    [facility] [employer] [facility]    Transportation Needs     In the past 12 months, has lack of transportation kept you from medical appointments, meetings, work, or from getting medicines or things needed for daily living?: No   Interpersonal Safety: Low Risk  (1/10/2025)    Interpersonal Safety     Do you feel physically and emotionally safe where you currently live?: Yes     Within the past 12 months, have you been hit, slapped, kicked or otherwise physically hurt by someone?: No     Within the past 12 months, have you been humiliated or emotionally abused in other ways by your partner or ex-partner?: No   Housing Stability: Low Risk  (6/3/2024)    Received from [employer] [facility] [employer] [facility]    Housing Stability Vital Sign     Unable to Pay for Housing in the Last Year: No     Number of Times Moved in the Last Year: 1     Homeless in the Last Year: No       Family History - History reviewed. No pertinent family history.    Review of Systems - As per HPI and PMHx, otherwise review of system review of the head and neck negative. Otherwise 10+ review of system is negative    Physical Exam  /84   Temp 97.9  F (36.6  C) (Temporal)   " Ht 1.753 m (5' 9\")   Wt 99.7 kg (219 lb 11.2 oz)   BMI 32.44 kg/m    BMI: Body mass index is 32.44 kg/m .    General - The patient is well nourished and well developed, and appears to have good nutritional status.  Alert and oriented to person and place, answers questions and cooperates with examination appropriately.    SKIN - No suspicious lesions or rashes.  Respiration - No respiratory distress.  Head and Face - Normocephalic and atraumatic, with no gross asymmetry noted of the contour of the facial features.  The facial nerve is intact, with strong symmetric movements.    Voice and Breathing - The patient was breathing comfortably without the use of accessory muscles. The patients voice was clear and strong, and had appropriate pitch and quality.    Ears - Bilateral pinna and EACs with normal appearing overlying skin.  On the right side tympanic membrane appears thickened retracted little bit of dry granulation tissue noted in the central area of the drum.  On the left side large inflammatory polyp seems to be coming out from the middle ear space with some purulence around it.    Eyes - Extraocular movements intact.  Sclera were not icteric or injected, conjunctiva were pink and moist.    Mouth - Examination of the oral cavity showed pink, healthy oral mucosa. No lesions or ulcerations noted.  The tongue was mobile and midline, and the dentition were in good condition.      Throat - The walls of the oropharynx were smooth, pink, moist, symmetric, and had no lesions or ulcerations.  The tonsillar pillars and soft palate were symmetric.  The uvula was midline on elevation.    Neck - Normal midline excursion of the laryngotracheal complex during swallowing.  Full range of motion on passive movement.  Palpation of the occipital, submental, submandibular, internal jugular chain, and supraclavicular nodes did not demonstrate any abnormal lymph nodes or masses.  The carotid pulse was palpable bilaterally.  " Palpation of the thyroid was soft and smooth, with no nodules or goiter appreciated.  The trachea was mobile and midline.  No postauricular tenderness noted.    Nose - External contour is symmetric, no gross deflection or scars.  Nasal mucosa is pink and moist with no abnormal mucus.  The septum was midline and non-obstructive, turbinates of normal size and position.  No polyps, masses, or purulence noted on examination.    Neuro - Nonfocal neuro exam is normal, CN 2 through 12 intact, normal gait and muscle tone.      Performed in clinic today:  Audiologic Studies - An audiogram and tympanogram were performed today as part of the evaluation and personally reviewed. The tympanogram shows Type B curves on the right and Type B curves on the left, with elevated canal volumes and middle ear pressures.  The audiogram showed moderate sensorineural loss on the right and severe mixed loss on the left.    Recognition score 100% on the right versus 84% on the left.    A/P - Tee Escudero is a 65 year old male with what appears to be chronic otitis media and likely chronic mastoiditis with inflammatory polyp on the left.  Will start Ciprodex drops at this point for both ears because he does have some dry inflammatory debris on the right as well.  Will get CT scan of the temporal bones we started the drops.  Will also refer him to the [facility] otology for further evaluation of this complex problem with chronic mastoiditis.    Frederick Wray MD

## 2025-02-03 ENCOUNTER — OFFICE VISIT (OUTPATIENT)
Dept: OTOLARYNGOLOGY | Facility: CLINIC | Age: 65
End: 2025-02-03
Payer: COMMERCIAL

## 2025-02-03 ENCOUNTER — OFFICE VISIT (OUTPATIENT)
Dept: AUDIOLOGY | Facility: CLINIC | Age: 65
End: 2025-02-03
Payer: COMMERCIAL

## 2025-02-03 VITALS
HEIGHT: 69 IN | SYSTOLIC BLOOD PRESSURE: 136 MMHG | TEMPERATURE: 97.9 F | DIASTOLIC BLOOD PRESSURE: 84 MMHG | BODY MASS INDEX: 32.54 KG/M2 | WEIGHT: 219.7 LBS

## 2025-02-03 DIAGNOSIS — H66.016 RECURRENT ACUTE SUPPURATIVE OTITIS MEDIA WITH SPONTANEOUS RUPTURE OF BOTH TYMPANIC MEMBRANES: ICD-10-CM

## 2025-02-03 DIAGNOSIS — H90.6 MIXED CONDUCTIVE AND SENSORINEURAL HEARING LOSS OF BOTH EARS: Primary | ICD-10-CM

## 2025-02-03 DIAGNOSIS — H93.13 TINNITUS OF BOTH EARS: ICD-10-CM

## 2025-02-03 DIAGNOSIS — H90.A32 MIXED CONDUCTIVE AND SENSORINEURAL HEARING LOSS OF LEFT EAR WITH RESTRICTED HEARING OF RIGHT EAR: ICD-10-CM

## 2025-02-03 DIAGNOSIS — H65.493 CHRONIC NONSUPPURATIVE OTITIS MEDIA, BILATERAL: Primary | ICD-10-CM

## 2025-02-03 DIAGNOSIS — H70.12 CHRONIC MASTOIDITIS, LEFT: ICD-10-CM

## 2025-02-03 PROCEDURE — 92557 COMPREHENSIVE HEARING TEST: CPT | Performed by: AUDIOLOGIST

## 2025-02-03 PROCEDURE — 99203 OFFICE O/P NEW LOW 30 MIN: CPT | Performed by: OTOLARYNGOLOGY

## 2025-02-03 PROCEDURE — 92567 TYMPANOMETRY: CPT | Performed by: AUDIOLOGIST

## 2025-02-03 RX ORDER — CIPROFLOXACIN AND DEXAMETHASONE 3; 1 MG/ML; MG/ML
4 SUSPENSION/ DROPS AURICULAR (OTIC) 2 TIMES DAILY
Qty: 7.5 ML | Refills: 1 | Status: SHIPPED | OUTPATIENT
Start: 2025-02-03 | End: 2025-02-17

## 2025-02-03 NOTE — PROGRESS NOTES
AUDIOLOGY REPORT     SUMMARY: Audiology visit completed. See audiogram for results.     RECOMMENDATIONS: Follow-up with ENT    Ian Ramachandran Licensed Audiologist #[id number]

## 2025-02-03 NOTE — LETTER
2/3/2025      Tee Escudero  [address] [facility] [address]      Dear Colleague,    Thank you for referring your patient, Tee Escudero, to the [facility]. Please see a copy of my visit note below.    ENT Consultation    Tee Escudero who is a 65 year old male seen in consultation at the request of Dr. Tyler Ang.      History of Present Illness - Tee Escudero is a 65 year old male patient with apparent prior history of ear disease may have had surgery on his left ear in the past presents because of persistent discharge from his left ear for over a year.  Has apparently had problems with the right as well.  He recently had shingles on the right side of his face but the problem is the left ear.  He feels that the hearing has really dropped especially lately.  He had drains in the mid and purulent material on the left.  He has been on some oral antibiotics off-and-on that helps a little bit.  He experienced some pressure discomfort on the left but not the right.  He reported some dizziness in the past but not vertigo at this point.  He endorses bilateral tinnitus nonpulsatile constant.          BP Readings from Last 1 Encounters:   02/03/25 136/84       BP noted to be well controlled today in office.     Tee IS a smoker/uses chewing tobacco.  Tee is not ready to quit      Past Medical History -   Past Medical History:   Diagnosis Date     Uveitis        Current Medications -   Current Outpatient Medications:      dorzolamide-timolol (COSOPT) 2-0.5 % ophthalmic solution, Place 1 drop into the right eye 3 times daily., Disp: 10 mL, Rfl: 5     prednisoLONE acetate (PRED FORTE) 1 % ophthalmic suspension, Place 1 drop into the right eye 3 times daily., Disp: 10 mL, Rfl: 5     valACYclovir (VALTREX) 1000 mg tablet, Take 1 tablet (1,000 mg) by mouth 3 times daily., Disp: 90 tablet, Rfl: 1     Elastic Bandages & Supports (CURITY STRETCH BANDAGE) MISC, 1 each 2 times daily  (Patient not taking: Reported on 6/14/2024), Disp: 24 each, Rfl: 1     gabapentin (NEURONTIN) 300 MG capsule, Take 1 capsule (300 mg) by mouth 3 times daily. (Patient not taking: Reported on 2/3/2025), Disp: 90 capsule, Rfl: 1     oxyCODONE (ROXICODONE) 5 MG tablet, Take 1 tablet (5 mg) by mouth every 6 hours as needed for severe pain (Patient not taking: Reported on 6/14/2024), Disp: 12 tablet, Rfl: 0    Allergies - No Known Allergies    Social History -   Social History     Socioeconomic History     Marital status: Single   Tobacco Use     Smoking status: Some Days     Types: Cigarettes     Smokeless tobacco: Former     Types: Chew   Vaping Use     Vaping status: Never Used     Social Drivers of Health     Food Insecurity: Food Insecurity Present (6/3/2024)    Received from [employer]    Hunger Vital Sign      Worried About Running Out of Food in the Last Year: Sometimes true      Ran Out of Food in the Last Year: Often true   Transportation Needs: No Transportation Needs (6/3/2024)    Received from [employer]    Transportation Needs      In the past 12 months, has lack of transportation kept you from medical appointments, meetings, work, or from getting medicines or things needed for daily living?: No   Interpersonal Safety: Low Risk  (1/10/2025)    Interpersonal Safety      Do you feel physically and emotionally safe where you currently live?: Yes      Within the past 12 months, have you been hit, slapped, kicked or otherwise physically hurt by someone?: No      Within the past 12 months, have you been humiliated or emotionally abused in other ways by your partner or ex-partner?: No   Housing Stability: Low Risk  (6/3/2024)    Received from [employer]    Housing Stability Vital Sign      Unable to Pay for Housing in the Last Year: No      Number of Times Moved in the Last Year: 1      Homeless in the Last Year: No       Family History - History  "reviewed. No pertinent family history.    Review of Systems - As per HPI and PMHx, otherwise review of system review of the head and neck negative. Otherwise 10+ review of system is negative    Physical Exam  /84   Temp 97.9  F (36.6  C) (Temporal)   Ht 1.753 m (5' 9\")   Wt 99.7 kg (219 lb 11.2 oz)   BMI 32.44 kg/m    BMI: Body mass index is 32.44 kg/m .    General - The patient is well nourished and well developed, and appears to have good nutritional status.  Alert and oriented to person and place, answers questions and cooperates with examination appropriately.    SKIN - No suspicious lesions or rashes.  Respiration - No respiratory distress.  Head and Face - Normocephalic and atraumatic, with no gross asymmetry noted of the contour of the facial features.  The facial nerve is intact, with strong symmetric movements.    Voice and Breathing - The patient was breathing comfortably without the use of accessory muscles. The patients voice was clear and strong, and had appropriate pitch and quality.    Ears - Bilateral pinna and EACs with normal appearing overlying skin.  On the right side tympanic membrane appears thickened retracted little bit of dry granulation tissue noted in the central area of the drum.  On the left side large inflammatory polyp seems to be coming out from the middle ear space with some purulence around it.    Eyes - Extraocular movements intact.  Sclera were not icteric or injected, conjunctiva were pink and moist.    Mouth - Examination of the oral cavity showed pink, healthy oral mucosa. No lesions or ulcerations noted.  The tongue was mobile and midline, and the dentition were in good condition.      Throat - The walls of the oropharynx were smooth, pink, moist, symmetric, and had no lesions or ulcerations.  The tonsillar pillars and soft palate were symmetric.  The uvula was midline on elevation.    Neck - Normal midline excursion of the laryngotracheal complex during swallowing.  " Full range of motion on passive movement.  Palpation of the occipital, submental, submandibular, internal jugular chain, and supraclavicular nodes did not demonstrate any abnormal lymph nodes or masses.  The carotid pulse was palpable bilaterally.  Palpation of the thyroid was soft and smooth, with no nodules or goiter appreciated.  The trachea was mobile and midline.  No postauricular tenderness noted.    Nose - External contour is symmetric, no gross deflection or scars.  Nasal mucosa is pink and moist with no abnormal mucus.  The septum was midline and non-obstructive, turbinates of normal size and position.  No polyps, masses, or purulence noted on examination.    Neuro - Nonfocal neuro exam is normal, CN 2 through 12 intact, normal gait and muscle tone.      Performed in clinic today:  Audiologic Studies - An audiogram and tympanogram were performed today as part of the evaluation and personally reviewed. The tympanogram shows Type B curves on the right and Type B curves on the left, with elevated canal volumes and middle ear pressures.  The audiogram showed moderate sensorineural loss on the right and severe mixed loss on the left.    Recognition score 100% on the right versus 84% on the left.    A/P - Tee Escudero is a 65 year old male with what appears to be chronic otitis media and likely chronic mastoiditis with inflammatory polyp on the left.  Will start Ciprodex drops at this point for both ears because he does have some dry inflammatory debris on the right as well.  Will get CT scan of the temporal bones we started the drops.  Will also refer him to the [facility] otology for further evaluation of this complex problem with chronic mastoiditis.    Frederick Wray MD       Again, thank you for allowing me to participate in the care of your patient.        Sincerely,        Frederick Wray MD, MD    Electronically signed

## 2025-02-07 NOTE — TELEPHONE ENCOUNTER
"  FUTURE VISIT INFORMATION      FUTURE VISIT INFORMATION:  Date: 5/21/25  Time: 9 AM  Location: [facility]   REFERRAL INFORMATION:  Referring provider:  Frederick Wray MD   Referring providers clinic:   ENT   Reason for visit/diagnosis:  H70.12 (ICD-10-CM) - Chronic mastoiditis, left, CT scan for 2/11/25, referral from Dr Wray, referral notes in EPIC. Pt made appt for [facility] Location     RECORDS REQUESTED FROM      Clinic name Comments Records Status Imaging Status    ENT  2/3/25 referral/ [facility]- Frederick Wray MD   2/3/25 audiogram Epic    [facility] 2/11/25 CT temporal Epic PACS   [facility] 6/2/24 CT head CE PACS           \"Please notify/message CSS if patient completed outside imaging prior to scheduled appointment and/or any outside records that might have been missed at pre visit -Thanks\"  "

## 2025-02-11 ENCOUNTER — HOSPITAL ENCOUNTER (OUTPATIENT)
Dept: CT IMAGING | Facility: CLINIC | Age: 65
Discharge: HOME | End: 2025-02-11
Attending: OTOLARYNGOLOGY
Payer: COMMERCIAL

## 2025-02-11 ENCOUNTER — TELEPHONE (OUTPATIENT)
Dept: ADMISSION | Facility: CLINIC | Age: 65
End: 2025-02-11
Payer: COMMERCIAL

## 2025-02-11 DIAGNOSIS — H65.493 CHRONIC NONSUPPURATIVE OTITIS MEDIA, BILATERAL: ICD-10-CM

## 2025-02-11 PROCEDURE — 70480 CT ORBIT/EAR/FOSSA W/O DYE: CPT

## 2025-02-11 NOTE — TELEPHONE ENCOUNTER
Reason for Call:  CT results, consult with Dr Bateman, see Dr Wray    Detailed comments: Tee stopped at the  asking to schedule an appointment with Dr Wray because Dr Bateman cannot see him until May 21st.  I did explain multiple times that Dr Aguilar referred him to her, but he doesn't want to wait until May. He is also asking if Dr Aguilar can get him in for surgery sooner because he doesn't want to wait 3 months.  He is concerned that the CT will show something is wrong with both ears, sinus, etc and will not be addressed because the appointment was scheduled before the test that was done today.  He did also add that if there are no openings soon to see Dr Wray he will come everyday to see if there is an opening.    Phone Number Patient can be reached at: Cell number on file:    Telephone Information:   Mobile [phone]       Best Time: anytime    Can we leave a detailed message on this number? YES    Call taken on 2/11/2025 at 11:48 AM by Yamilet Smart

## 2025-02-17 ENCOUNTER — OFFICE VISIT (OUTPATIENT)
Dept: OTOLARYNGOLOGY | Facility: CLINIC | Age: 65
End: 2025-02-17
Payer: COMMERCIAL

## 2025-02-17 VITALS
SYSTOLIC BLOOD PRESSURE: 130 MMHG | WEIGHT: 220 LBS | HEART RATE: 82 BPM | TEMPERATURE: 97.5 F | HEIGHT: 69 IN | BODY MASS INDEX: 32.58 KG/M2 | DIASTOLIC BLOOD PRESSURE: 84 MMHG

## 2025-02-17 DIAGNOSIS — H70.12 CHRONIC MASTOIDITIS, LEFT: Primary | ICD-10-CM

## 2025-02-17 DIAGNOSIS — H65.491 CHRONIC NONSUPPURATIVE OTITIS MEDIA, RIGHT: ICD-10-CM

## 2025-02-17 DIAGNOSIS — H72.93 PERFORATION OF TYMPANIC MEMBRANE, BILATERAL: ICD-10-CM

## 2025-02-17 PROCEDURE — 99213 OFFICE O/P EST LOW 20 MIN: CPT | Performed by: OTOLARYNGOLOGY

## 2025-02-17 ASSESSMENT — PAIN SCALES - GENERAL: PAINLEVEL_OUTOF10: MODERATE PAIN (4)

## 2025-02-17 NOTE — LETTER
2/17/2025      Tee Escudero  [address] [facility] [address]      Dear Colleague,    Thank you for referring your patient, Tee Escudero, to the [facility]. Please see a copy of my visit note below.    History of Present Illness - Tee Escudero is a 65 year old male presenting in clinic today for a recheck on Patient presents with:  RECHECK: Ct results    Patient with bilateral chronic otitis media/mastoiditis had extensive otologic surgery tympanomastoidectomy on the left in the past.  He has been on Ciprodex drops.  Indeed the right ear and feels much better does not drain he feels he can hear better.  On the left still feels some pressure.    Imaging study was done and the results are as follows..    EXAM: CT TEMPORAL W/O CONTRAST  LOCATION: [facility]  DATE: 2/11/2025     INDICATION:  Chronic nonsuppurative otitis media, bilateral  COMPARISON: None.  TECHNIQUE:  Routine without contrast including dedicated thin section multiplanar reformats of each temporal bone. Dose reduction techniques were used.     FINDINGS:  RIGHT TEMPORAL BONE: There is very mild thickening of the external auditory canal. A large defect is present in the tympanic membrane. Soft tissue opacifies Prussak's space without definite erosion of the scutum. There is minimal soft tissue thickening   within the middle ear cavity. The ossicular chain is intact. Scattered opacification of the left mastoid air cells. No evidence for otosclerosis. Normal cochlea, semicircular canals, vestibule, and vestibular aqueduct. Intact bony carotid canal and   facial nerve canal.      LEFT TEMPORAL BONE: Probable postsurgical changes of previous canal wall up mastoidectomy. Nonvisualized ossicular chain which is likely on a surgical basis, less likely secondary to erosion from soft tissue thickening filling the middle ear cavity.   There is soft tissue thickening of the mastoidectomy bowl,  residual mastoid air cells, external auditory canal and postoperative middle ear cavity. Multifocal osseous dehiscence is present involving the residual mastoidectomy wall, sigmoid plate, roof of   the tegmen tympani, and the lateral semicircular canal. There is also probable dehiscence of the facial nerve canal.      OTHER: No visualized paranasal sinus mucosal disease. The visualized intracranial compartment is unremarkable.                                                                      IMPRESSION:  1.  Extensive postoperative changes of the left temporal bone. Suspect previous canal wall up mastoidectomy and ossicular chain resection.  2.  There is extensive soft tissue filling the mastoidectomy bowl, residual mastoid air cells, and postoperative middle ear cavity. Multifocal osseous dehiscence is concerning for underlying cholesteatoma. Other considerations would include sequela of   chronic otitis/mastoiditis. Dehiscence is detailed above.  3.  Soft tissue thickening fills Prussak's space on the right without bony erosion of the scutum, indeterminate for an infectious/inflammatory debris or small cholesteatoma.  4.  Large defect in the right tympanic membrane.       BP Readings from Last 1 Encounters:   02/17/25 130/84       BP noted to be well controlled today in office.     Tee IS a smoker/uses chewing tobacco.  Tee is not ready to quit      Past Medical History -   Past Medical History:   Diagnosis Date     Uveitis        Current Medications -   Current Outpatient Medications:      ciprofloxacin-dexAMETHasone (CIPRODEX) 0.3-0.1 % otic suspension, Place 4 drops into both ears 2 times daily for 14 days., Disp: 7.5 mL, Rfl: 1     dorzolamide-timolol (COSOPT) 2-0.5 % ophthalmic solution, Place 1 drop into the right eye 3 times daily., Disp: 10 mL, Rfl: 5     Elastic Bandages & Supports (CURITY STRETCH BANDAGE) MISC, 1 each 2 times daily, Disp: 24 each, Rfl: 1     gabapentin (NEURONTIN) 300 MG capsule,  Take 1 capsule (300 mg) by mouth 3 times daily., Disp: 90 capsule, Rfl: 1     prednisoLONE acetate (PRED FORTE) 1 % ophthalmic suspension, Place 1 drop into the right eye 3 times daily., Disp: 10 mL, Rfl: 5     valACYclovir (VALTREX) 1000 mg tablet, Take 1 tablet (1,000 mg) by mouth 3 times daily., Disp: 90 tablet, Rfl: 1     oxyCODONE (ROXICODONE) 5 MG tablet, Take 1 tablet (5 mg) by mouth every 6 hours as needed for severe pain (Patient not taking: Reported on 6/14/2024), Disp: 12 tablet, Rfl: 0    Allergies - No Known Allergies    Social History -   Social History     Socioeconomic History     Marital status: Single   Tobacco Use     Smoking status: Some Days     Types: Cigarettes     Smokeless tobacco: Former     Types: Chew   Vaping Use     Vaping status: Never Used     Social Drivers of Health     Food Insecurity: Food Insecurity Present (6/3/2024)    Received from [employer]    Hunger Vital Sign      Worried About Running Out of Food in the Last Year: Sometimes true      Ran Out of Food in the Last Year: Often true   Transportation Needs: No Transportation Needs (6/3/2024)    Received from [employer]    Transportation Needs      In the past 12 months, has lack of transportation kept you from medical appointments, meetings, work, or from getting medicines or things needed for daily living?: No   Interpersonal Safety: Low Risk  (1/10/2025)    Interpersonal Safety      Do you feel physically and emotionally safe where you currently live?: Yes      Within the past 12 months, have you been hit, slapped, kicked or otherwise physically hurt by someone?: No      Within the past 12 months, have you been humiliated or emotionally abused in other ways by your partner or ex-partner?: No   Housing Stability: Low Risk  (6/3/2024)    Received from [employer]    Housing Stability Vital Sign      Unable to Pay for Housing in the Last Year: No      Number of  "Times Moved in the Last Year: 1      Homeless in the Last Year: No       Family History - History reviewed. No pertinent family history.    Review of Systems - As per HPI and PMHx, otherwise review of system review of the head and neck negative. Otherwise 10+ review of system is negative    Physical Exam  /84 (BP Location: Right arm, Patient Position: Sitting, Cuff Size: Adult Large)   Pulse 82   Temp 97.5  F (36.4  C) (Temporal)   Ht 1.753 m (5' 9\")   Wt 99.8 kg (220 lb)   BMI 32.49 kg/m    BMI: Body mass index is 32.49 kg/m .    General - The patient is well nourished and well developed, and appears to have good nutritional status.  Alert and oriented to person and place, answers questions and cooperates with examination appropriately.    SKIN - No suspicious lesions or rashes.  Respiration - No respiratory distress.  Head and Face - Normocephalic and atraumatic, with no gross asymmetry noted of the contour of the facial features.  The facial nerve is intact, with strong symmetric movements.    Voice and Breathing - The patient was breathing comfortably without the use of accessory muscles. The patients voice was clear and strong, and had appropriate pitch and quality.    Ears - Bilateral pinna and EACs with normal appearing overlying skin.  On the right side there is a large central perforation with some inflammation of the middle ear space thickened mucosa but no active discharge.  Canal appears to be clear and dry.  On the left side we still see inflammatory mucosa coming through perforation almost polypoid even though slightly smaller than previously but still inflamed.    Eyes - Extraocular movements intact.  Sclera were not icteric or injected, conjunctiva were pink and moist.    Neuro - Nonfocal neuro exam is normal, CN 2 through 12 intact, normal gait and muscle tone.      Performed in clinic today:  No procedures preformed in clinic today      A/P - Tee Escudero is a 65 year old male Patient " presents with:  RECHECK: Ct results    Patient with large perforation on the right but dry middle ear mucosa even though shows some signs of chronic inflammation but responding to drops.  On the left still inflammatory material very worrisome.  CT scan shows potential dehiscences around the facial canal semicircular canals which would require specialist otologist approach.  Unfortunately at the [facility] he would have to wait least 3 months to be seen.  I am therefore referring patient to see Dr. Osborn at [facility].    Tee should follow up depending on what will be done with his ears and the decision of the otologist.      Frederick Wray MD          Again, thank you for allowing me to participate in the care of your patient.        Sincerely,        Frederick Wray MD, MD    Electronically signed

## 2025-02-17 NOTE — PROGRESS NOTES
History of Present Illness - Tee Escudero is a 65 year old male presenting in clinic today for a recheck on Patient presents with:  RECHECK: Ct results    Patient with bilateral chronic otitis media/mastoiditis had extensive otologic surgery tympanomastoidectomy on the left in the past.  He has been on Ciprodex drops.  Indeed the right ear and feels much better does not drain he feels he can hear better.  On the left still feels some pressure.    Imaging study was done and the results are as follows..    EXAM: CT TEMPORAL W/O CONTRAST  LOCATION: [facility]  DATE: 2/11/2025     INDICATION:  Chronic nonsuppurative otitis media, bilateral  COMPARISON: None.  TECHNIQUE:  Routine without contrast including dedicated thin section multiplanar reformats of each temporal bone. Dose reduction techniques were used.     FINDINGS:  RIGHT TEMPORAL BONE: There is very mild thickening of the external auditory canal. A large defect is present in the tympanic membrane. Soft tissue opacifies Prussak's space without definite erosion of the scutum. There is minimal soft tissue thickening   within the middle ear cavity. The ossicular chain is intact. Scattered opacification of the left mastoid air cells. No evidence for otosclerosis. Normal cochlea, semicircular canals, vestibule, and vestibular aqueduct. Intact bony carotid canal and   facial nerve canal.      LEFT TEMPORAL BONE: Probable postsurgical changes of previous canal wall up mastoidectomy. Nonvisualized ossicular chain which is likely on a surgical basis, less likely secondary to erosion from soft tissue thickening filling the middle ear cavity.   There is soft tissue thickening of the mastoidectomy bowl, residual mastoid air cells, external auditory canal and postoperative middle ear cavity. Multifocal osseous dehiscence is present involving the residual mastoidectomy wall, sigmoid plate, roof of   the tegmen tympani, and the lateral  semicircular canal. There is also probable dehiscence of the facial nerve canal.      OTHER: No visualized paranasal sinus mucosal disease. The visualized intracranial compartment is unremarkable.                                                                      IMPRESSION:  1.  Extensive postoperative changes of the left temporal bone. Suspect previous canal wall up mastoidectomy and ossicular chain resection.  2.  There is extensive soft tissue filling the mastoidectomy bowl, residual mastoid air cells, and postoperative middle ear cavity. Multifocal osseous dehiscence is concerning for underlying cholesteatoma. Other considerations would include sequela of   chronic otitis/mastoiditis. Dehiscence is detailed above.  3.  Soft tissue thickening fills Prussak's space on the right without bony erosion of the scutum, indeterminate for an infectious/inflammatory debris or small cholesteatoma.  4.  Large defect in the right tympanic membrane.       BP Readings from Last 1 Encounters:   02/17/25 130/84       BP noted to be well controlled today in office.     Tee IS a smoker/uses chewing tobacco.  Tee is not ready to quit      Past Medical History -   Past Medical History:   Diagnosis Date    Uveitis        Current Medications -   Current Outpatient Medications:     ciprofloxacin-dexAMETHasone (CIPRODEX) 0.3-0.1 % otic suspension, Place 4 drops into both ears 2 times daily for 14 days., Disp: 7.5 mL, Rfl: 1    dorzolamide-timolol (COSOPT) 2-0.5 % ophthalmic solution, Place 1 drop into the right eye 3 times daily., Disp: 10 mL, Rfl: 5    Elastic Bandages & Supports (CURITY STRETCH BANDAGE) MISC, 1 each 2 times daily, Disp: 24 each, Rfl: 1    gabapentin (NEURONTIN) 300 MG capsule, Take 1 capsule (300 mg) by mouth 3 times daily., Disp: 90 capsule, Rfl: 1    prednisoLONE acetate (PRED FORTE) 1 % ophthalmic suspension, Place 1 drop into the right eye 3 times daily., Disp: 10 mL, Rfl: 5    valACYclovir (VALTREX) 1000 mg  tablet, Take 1 tablet (1,000 mg) by mouth 3 times daily., Disp: 90 tablet, Rfl: 1    oxyCODONE (ROXICODONE) 5 MG tablet, Take 1 tablet (5 mg) by mouth every 6 hours as needed for severe pain (Patient not taking: Reported on 6/14/2024), Disp: 12 tablet, Rfl: 0    Allergies - No Known Allergies    Social History -   Social History     Socioeconomic History    Marital status: Single   Tobacco Use    Smoking status: Some Days     Types: Cigarettes    Smokeless tobacco: Former     Types: Chew   Vaping Use    Vaping status: Never Used     Social Drivers of Health     Food Insecurity: Food Insecurity Present (6/3/2024)    [facility]    Hunger Vital Sign     Worried About Running Out of Food in the Last Year: Sometimes true     Ran Out of Food in the Last Year: Often true   Transportation Needs: No Transportation Needs (6/3/2024)    [facility]    Transportation Needs     In the past 12 months, has lack of transportation kept you from medical appointments, meetings, work, or from getting medicines or things needed for daily living?: No   Interpersonal Safety: Low Risk  (1/10/2025)    Interpersonal Safety     Do you feel physically and emotionally safe where you currently live?: Yes     Within the past 12 months, have you been hit, slapped, kicked or otherwise physically hurt by someone?: No     Within the past 12 months, have you been humiliated or emotionally abused in other ways by your partner or ex-partner?: No   Housing Stability: Low Risk  (6/3/2024)    [facility]    Housing Stability Vital Sign     Unable to Pay for Housing in the Last Year: No     Number of Times Moved in the Last Year: 1     Homeless in the Last Year: No       Family History - History reviewed. No pertinent family history.    Review of Systems - As per HPI and PMHx, otherwise review of system review of the head and neck negative. Otherwise 10+  "review of system is negative    Physical Exam  /84 (BP Location: Right arm, Patient Position: Sitting, Cuff Size: Adult Large)   Pulse 82   Temp 97.5  F (36.4  C) (Temporal)   Ht 1.753 m (5' 9\")   Wt 99.8 kg (220 lb)   BMI 32.49 kg/m    BMI: Body mass index is 32.49 kg/m .    General - The patient is well nourished and well developed, and appears to have good nutritional status.  Alert and oriented to person and place, answers questions and cooperates with examination appropriately.    SKIN - No suspicious lesions or rashes.  Respiration - No respiratory distress.  Head and Face - Normocephalic and atraumatic, with no gross asymmetry noted of the contour of the facial features.  The facial nerve is intact, with strong symmetric movements.    Voice and Breathing - The patient was breathing comfortably without the use of accessory muscles. The patients voice was clear and strong, and had appropriate pitch and quality.    Ears - Bilateral pinna and EACs with normal appearing overlying skin.  On the right side there is a large central perforation with some inflammation of the middle ear space thickened mucosa but no active discharge.  Canal appears to be clear and dry.  On the left side we still see inflammatory mucosa coming through perforation almost polypoid even though slightly smaller than previously but still inflamed.    Eyes - Extraocular movements intact.  Sclera were not icteric or injected, conjunctiva were pink and moist.    Neuro - Nonfocal neuro exam is normal, CN 2 through 12 intact, normal gait and muscle tone.      Performed in clinic today:  No procedures preformed in clinic today      A/P - Tee Escudero is a 65 year old male Patient presents with:  RECHECK: Ct results    Patient with large perforation on the right but dry middle ear mucosa even though shows some signs of chronic inflammation but responding to drops.  On the left still inflammatory material very worrisome.  CT scan shows " potential dehiscences around the facial canal semicircular canals which would require specialist otologist approach.  Unfortunately at the [facility] he would have to wait least 3 months to be seen.  I am therefore referring patient to see Dr. Osborn at [facility].    Tee should follow up depending on what will be done with his ears and the decision of the otologist.      Frederick Wray MD

## 2025-02-20 ENCOUNTER — TELEPHONE (OUTPATIENT)
Dept: OTOLARYNGOLOGY | Facility: CLINIC | Age: 65
End: 2025-02-20
Payer: COMMERCIAL

## 2025-02-20 NOTE — TELEPHONE ENCOUNTER
[facility] Call Center    Phone Message    May a detailed message be left on voicemail: yes     Reason for Call: Other: Pt is requesting to speak with care team regarding a referral. Pt would like a referral sent to [facility] in [place]. Per pt provider did mention that he would submit an order however [facility] has not received it. Please call once order has been submitted. Did confirm phone number. Thanks       Action Taken: Message routed to:  Other: ENT     Travel Screening: Not Applicable     Date of Service:

## 2025-02-24 ENCOUNTER — TELEPHONE (OUTPATIENT)
Dept: OTOLARYNGOLOGY | Facility: CLINIC | Age: 65
End: 2025-02-24
Payer: COMMERCIAL

## 2025-02-24 NOTE — TELEPHONE ENCOUNTER
Per Dr. Wray appt he has spoke with Dr. Osborn and she was going to have her team work patient into her schedule soon, no referral is required. Megan Fitzpatrick, [facility]

## 2025-02-24 NOTE — TELEPHONE ENCOUNTER
[facility] Call Center    Phone Message    May a detailed message be left on voicemail: yes     Reason for Call: Other: Pt calling back to check the status of a ref that was to be placed by Dr Wray to [facility] in [place]. States it is to be faxed to [phone]. Please call pt once faxed. Thanks.      Action Taken: Other: ENT    Travel Screening: Not Applicable     Date of Service:

## 2025-02-24 NOTE — TELEPHONE ENCOUNTER
Dr. Wray spoke with Dr. Osborn. They will expidite his visit the best they can. Patient updated. He said he's scheduled April 1st with Dr. Osborn. He requested we recent imaging, audiogram, office visit note from Dr. Wray to [facility]. Since Dr. Wray referred patient to this place, ok to fax. Faxed to [phone]. Verified via RightFax.    Isabel Harper RN on 2/24/2025 at 12:37 PM

## 2025-03-21 ENCOUNTER — HOSPITAL ENCOUNTER (EMERGENCY)
Facility: CLINIC | Age: 65
Discharge: HOME | End: 2025-03-21
Attending: STUDENT IN AN ORGANIZED HEALTH CARE EDUCATION/TRAINING PROGRAM | Admitting: STUDENT IN AN ORGANIZED HEALTH CARE EDUCATION/TRAINING PROGRAM
Payer: COMMERCIAL

## 2025-03-21 VITALS
DIASTOLIC BLOOD PRESSURE: 95 MMHG | BODY MASS INDEX: 31.22 KG/M2 | SYSTOLIC BLOOD PRESSURE: 147 MMHG | HEART RATE: 75 BPM | OXYGEN SATURATION: 98 % | RESPIRATION RATE: 26 BRPM | HEIGHT: 71 IN | WEIGHT: 223 LBS | TEMPERATURE: 97.4 F

## 2025-03-21 DIAGNOSIS — Z71.1 WORRIED WELL: ICD-10-CM

## 2025-03-21 PROCEDURE — 99282 EMERGENCY DEPT VISIT SF MDM: CPT | Performed by: STUDENT IN AN ORGANIZED HEALTH CARE EDUCATION/TRAINING PROGRAM

## 2025-03-21 PROCEDURE — 99283 EMERGENCY DEPT VISIT LOW MDM: CPT | Performed by: STUDENT IN AN ORGANIZED HEALTH CARE EDUCATION/TRAINING PROGRAM

## 2025-03-21 ASSESSMENT — COLUMBIA-SUICIDE SEVERITY RATING SCALE - C-SSRS
6. HAVE YOU EVER DONE ANYTHING, STARTED TO DO ANYTHING, OR PREPARED TO DO ANYTHING TO END YOUR LIFE?: NO
1. IN THE PAST MONTH, HAVE YOU WISHED YOU WERE DEAD OR WISHED YOU COULD GO TO SLEEP AND NOT WAKE UP?: NO
2. HAVE YOU ACTUALLY HAD ANY THOUGHTS OF KILLING YOURSELF IN THE PAST MONTH?: NO

## 2025-03-21 ASSESSMENT — ACTIVITIES OF DAILY LIVING (ADL): ADLS_ACUITY_SCORE: 51

## 2025-03-21 NOTE — ED PROVIDER NOTES
History     Chief Complaint   Patient presents with    Wound Check     HPI  Tee Escudero is a 65 year old male presents with concerns of a nodule around his rectal area about 2 days ago.  Rectal area that he wants evaluated.  He has extensive history of MRSA that has led to significant abscess formation as well as a history of perirectal cellulitis requiring extensive debridement.  He does not have any fevers nausea vomiting chest pain chills reinserted breath or cough.  He denies any recent trauma to the area.  He is stooling appropriately.  Urinations.  Unchanged.  Has not had any noted other spreading redness or pain.    Allergies:  No Known Allergies    Problem List:    Patient Active Problem List    Diagnosis Date Noted    Cellulitis of perineum 01/26/2024     Priority: Medium        Past Medical History:    Past Medical History:   Diagnosis Date    Uveitis        Past Surgical History:    Past Surgical History:   Procedure Laterality Date    CYSTOSCOPY, RETROGRADES, COMBINED Left 1/27/2024    Procedure: Cystoscopy, retrogrades, combined;  Surgeon: Buck Allen MD;  Location: [facility] OR    INCISION AND DRAINAGE PERINEAL, COMBINED Left 1/27/2024    Procedure: Incision and drainage perineal, combined;  Surgeon: Buck Allen MD;  Location:  OR    LASER HOLMIUM LITHOTRIPSY URETER(S), INSERT STENT, COMBINED Left 1/27/2024    Procedure: Cystoscopy, left retrograde pyelogram, interpretation of fluoroscopic images, left ureteroscopy with thulium laser lithotripsy and stone basketing, left ureteral stent placement. Incision and drainage of abscess of the left groin and perineum 22 modifier for difficult and lengthy case;  Surgeon: Buck Allen MD;  Location: [facility] OR    SOFT TISSUE SURGERY         Family History:    No family history on file.    Social History:  Marital Status:  Single [1]  Social History     Tobacco Use    Smoking status: Some Days     Types: Cigarettes    Smokeless  "tobacco: Former     Types: Chew   Vaping Use    Vaping status: Never Used        Medications:    dorzolamide-timolol (COSOPT) 2-0.5 % ophthalmic solution  doxycycline hyclate (VIBRAMYCIN) 100 MG capsule  Elastic Bandages & Supports (CURITY STRETCH BANDAGE) MISC  gabapentin (NEURONTIN) 300 MG capsule  metroNIDAZOLE (METROCREAM) 0.75 % external cream  oxyCODONE (ROXICODONE) 5 MG tablet  prednisoLONE acetate (PRED FORTE) 1 % ophthalmic suspension  valACYclovir (VALTREX) 1000 mg tablet          Review of Systems   Skin:         Concern for bump around anal area   All other systems reviewed and are negative.      Physical Exam   BP: (!) 147/95  Pulse: 75  Temp: 97.4  F (36.3  C)  Resp: 26  Height: 180.3 cm (5' 11\")  Weight: 101.2 kg (223 lb)  SpO2: 98 %      Physical Exam  Vitals and nursing note reviewed.   Constitutional:       General: He is not in acute distress.     Appearance: Normal appearance. He is not toxic-appearing.   HENT:      Head: Atraumatic.   Eyes:      General: No scleral icterus.     Conjunctiva/sclera: Conjunctivae normal.   Cardiovascular:      Rate and Rhythm: Normal rate.      Heart sounds: Normal heart sounds.   Pulmonary:      Effort: Pulmonary effort is normal. No respiratory distress.      Breath sounds: Normal breath sounds.   Abdominal:      Palpations: Abdomen is soft.      Tenderness: There is no abdominal tenderness.   Musculoskeletal:         General: No deformity.      Cervical back: Neck supple.   Skin:     General: Skin is warm and dry.      Findings: No bruising, erythema, lesion or rash.      Comments: Entirety of left and right rectal area examined   Neurological:      General: No focal deficit present.      Mental Status: He is alert and oriented to person, place, and time.   Psychiatric:         Mood and Affect: Mood normal.         ED Course        Procedures                No results found for this or any previous visit (from the past 24 hours).    Medications - No data to " display    Assessments & Plan (with Medical Decision Making)     I have reviewed the nursing notes.    I have reviewed the findings, diagnosis, plan and need for follow up with the patient.      Medical Decision Making  Tee Escudero is a 65 year old male presents with concerns of a nodule around his rectal area about 2 days ago.  Rectal area that he wants evaluated.  He has extensive history of MRSA that has led to significant abscess formation as well as a history of perirectal cellulitis requiring extensive debridement.  He does not have any fevers nausea vomiting chest pain chills reinserted breath or cough.  He denies any recent trauma to the area.  He is stooling appropriately.  Urinations.  Unchanged.  Has not had any noted other spreading redness or pain.    Patient's vitals are reassuring has not had any fevers and today's temperature is 97.4 with no tachycardia hypotension or oxygen demand issues.  She is having a difficult time finding the area of concern however pointing the general direction of where it was.  On self inspection cannot find any palpable masses erythema redness overlying skin changes injury trauma identifiable as a concern at this point.  However did do a bedside ultrasound as well for possible deep-seated abscesses however again could not find any source of nodularity or circumferential pathology or abnormalities.  We discussed lab work and imaging however patient felt he just may be slightly more anxious in regards to this due to this read history of cellulitis to this area and just wanted to make sure that there is nothing acutely abnormal.  [place] mile from the hospital.  He explained that if anything changes he will come back right away but felt comfortable that we do not see anything today on evaluation.  Is also currently on Flagyl and doxycycline for an nasal cavity/sinusitis infection that he started on 3/14 that he has a few days left on.      With unremarkable  examination vitals imaging and currently being on appropriate antibiotics feel comfortable at this point to discharge patient home with close return precautions.  Patient is also happy with this plan and discharged home.        New Prescriptions    No medications on file       Final diagnoses:   Worried well       3/21/2025   [facility] EMERGENCY DEPT       Mu Hurley MD  03/21/25 [id number]

## 2025-03-21 NOTE — ED TRIAGE NOTES
PT presents with c/o a pea-sized lump near the Anus. PT states that he noticed this 3 nights ago. Reports mild pain in that area. No puss or discharges reported by the PT. History of MRSA.

## 2025-03-21 NOTE — DISCHARGE INSTRUCTIONS
You are seen today for the concern of the lesion on the rectal area that was not currently present on examination.  Ultimately you are on antibiotics that do cover MRSA therefore recommend continued antibiotics.  Please return if you have any new symptoms or concerns.

## 2025-04-06 ENCOUNTER — HOSPITAL ENCOUNTER (EMERGENCY)
Facility: CLINIC | Age: 65
Discharge: HOME | End: 2025-04-06
Attending: PHYSICIAN ASSISTANT | Admitting: PHYSICIAN ASSISTANT
Payer: COMMERCIAL

## 2025-04-06 VITALS
SYSTOLIC BLOOD PRESSURE: 127 MMHG | HEART RATE: 84 BPM | WEIGHT: 220 LBS | DIASTOLIC BLOOD PRESSURE: 75 MMHG | RESPIRATION RATE: 18 BRPM | BODY MASS INDEX: 30.8 KG/M2 | HEIGHT: 71 IN | TEMPERATURE: 97.4 F | OXYGEN SATURATION: 98 %

## 2025-04-06 DIAGNOSIS — T16.1XXA FOREIGN BODY OF RIGHT EAR: ICD-10-CM

## 2025-04-06 PROCEDURE — 69200 CLEAR OUTER EAR CANAL: CPT | Mod: RT | Performed by: PHYSICIAN ASSISTANT

## 2025-04-06 PROCEDURE — 99282 EMERGENCY DEPT VISIT SF MDM: CPT | Mod: 25 | Performed by: PHYSICIAN ASSISTANT

## 2025-04-06 ASSESSMENT — COLUMBIA-SUICIDE SEVERITY RATING SCALE - C-SSRS
1. IN THE PAST MONTH, HAVE YOU WISHED YOU WERE DEAD OR WISHED YOU COULD GO TO SLEEP AND NOT WAKE UP?: NO
2. HAVE YOU ACTUALLY HAD ANY THOUGHTS OF KILLING YOURSELF IN THE PAST MONTH?: NO
6. HAVE YOU EVER DONE ANYTHING, STARTED TO DO ANYTHING, OR PREPARED TO DO ANYTHING TO END YOUR LIFE?: NO

## 2025-04-07 NOTE — ED TRIAGE NOTES
Patient reports using a Q-tip in his right ear and says the cotton got stuck, he tried to flush it but unable to get cotton out.

## 2025-04-07 NOTE — ED PROVIDER NOTES
History     Chief Complaint   Patient presents with    Foreign Body in Ear     HPI  Tee Escudero is a 65 year old male who presents for evaluation of a Q-tip piece in his ear.  He tried to flush out with water, but was unsuccessful.  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  Follows with ENT regularly.  Denies any purulent drainage.  He lost the piece of the Q-tip just prior to arrival.    Allergies:  No Known Allergies    Problem List:    Patient Active Problem List    Diagnosis Date Noted    Cellulitis of perineum 01/26/2024     Priority: Medium        Past Medical History:    Past Medical History:   Diagnosis Date    Uveitis        Past Surgical History:    Past Surgical History:   Procedure Laterality Date    CYSTOSCOPY, RETROGRADES, COMBINED Left 1/27/2024    Procedure: Cystoscopy, retrogrades, combined;  Surgeon: Buck Allen MD;  Location: [facility] OR    INCISION AND DRAINAGE PERINEAL, COMBINED Left 1/27/2024    Procedure: Incision and drainage perineal, combined;  Surgeon: Buck Allen MD;  Location: [facility] OR    LASER HOLMIUM LITHOTRIPSY URETER(S), INSERT STENT, COMBINED Left 1/27/2024    Procedure: Cystoscopy, left retrograde pyelogram, interpretation of fluoroscopic images, left ureteroscopy with thulium laser lithotripsy and stone basketing, left ureteral stent placement. Incision and drainage of abscess of the left groin and perineum 22 modifier for difficult and lengthy case;  Surgeon: Buck Allen MD;  Location: [facility] OR    SOFT TISSUE SURGERY         Family History:    No family history on file.    Social History:  Marital Status:  Single [1]  Social History     Tobacco Use    Smoking status: Some Days     Types: Cigarettes    Smokeless tobacco: Former     Types: Chew   Vaping Use    Vaping status: Never Used        Medications:    dorzolamide-timolol (COSOPT) 2-0.5 % ophthalmic solution  doxycycline hyclate (VIBRAMYCIN) 100 MG capsule  Elastic Bandages &  "Supports (CURITY STRETCH BANDAGE) MISC  gabapentin (NEURONTIN) 300 MG capsule  metroNIDAZOLE (METROCREAM) 0.75 % external cream  oxyCODONE (ROXICODONE) 5 MG tablet  prednisoLONE acetate (PRED FORTE) 1 % ophthalmic suspension  valACYclovir (VALTREX) 1000 mg tablet          Review of Systems   All other systems reviewed and are negative.      Physical Exam   BP: 127/75  Pulse: 84  Temp: 97.4  F (36.3  C)  Resp: 18  Height: 180.3 cm (5' 11\")  Weight: 99.8 kg (220 lb)  SpO2: 98 %      Physical Exam  Vitals and nursing note reviewed.   Constitutional:       General: He is not in acute distress.     Appearance: He is not diaphoretic.   HENT:      Head: Normocephalic and atraumatic.      Left Ear: External ear normal.      Ears:      Comments: Patient has what appears to be a piece of white cotton ball Dobb just next to the TM anteriorly in the deep external auditory canal.  There is no bleeding noted.  No drainage.  No other abnormalities.     Nose: Nose normal.      Mouth/Throat:      Pharynx: No oropharyngeal exudate.   Eyes:      General: No scleral icterus.        Right eye: No discharge.         Left eye: No discharge.      Conjunctiva/sclera: Conjunctivae normal.      Pupils: Pupils are equal, round, and reactive to light.   Neck:      Thyroid: No thyromegaly.   Cardiovascular:      Rate and Rhythm: Normal rate and regular rhythm.      Heart sounds: Normal heart sounds. No murmur heard.  Pulmonary:      Effort: Pulmonary effort is normal. No respiratory distress.      Breath sounds: Normal breath sounds. No wheezing or rales.   Chest:      Chest wall: No tenderness.   Abdominal:      General: Bowel sounds are normal. There is no distension.      Palpations: Abdomen is soft. There is no mass.      Tenderness: There is no abdominal tenderness. There is no guarding or rebound.   Musculoskeletal:         General: No tenderness or deformity. Normal range of motion.      Cervical back: Normal range of motion and neck " supple.   Lymphadenopathy:      Cervical: No cervical adenopathy.   Skin:     General: Skin is warm and dry.      Capillary Refill: Capillary refill takes less than 2 seconds.      Findings: No erythema or rash.   Neurological:      Mental Status: He is alert and oriented to person, place, and time.      Cranial Nerves: No cranial nerve deficit.   Psychiatric:         Behavior: Behavior normal.         Thought Content: Thought content normal.         [facility]    Procedure: Right ear canal foreign body removal    Date/Time: 4/6/2025 9:53 PM    Performed by: Juice Tapia PA-C  Authorized by: Juice Tapia PA-C    Risks, benefits and alternatives discussed.      PROCEDURE  Describe Procedure: Is very difficult to get light into the external auditory canal using main to flying glass.  Therefore, I needed to utilize the otoscope and open the window.  With a magnifying glass I was able to visualize the alligator clip as I extended it through the otoscope and was able to then retract the Q-tip cotton out of the ear canal without any pain.  No bleeding or injury occurred.  Patient states that the symptoms immediately resolved.  Patient Tolerance:  Patient tolerated the procedure well with no immediate complications                Critical Care time:  none     None         No results found for this or any previous visit (from the past 24 hours).    Medications - No data to display    Assessments & Plan (with Medical Decision Making)  Foreign body of right ear     65 year old male presents for evaluation of a right ear canal foreign body.  He accidentally left a piece of Q-tip in there.  See HPI above for details.  The Q-tip cotton was at the anterior aspect of the ear canal up next to the TM.  Thankfully, I was able to grab that utilizing an otoscope and an alligator forceps.  He did not have any pain at all.  I was able to pull it out without any  complications.  No bleeding.  Post exam with chronic serous effusion which is always the case for him.  No injuries occurred.     I have reviewed the nursing notes.    I have reviewed the findings, diagnosis, plan and need for follow up with the patient.           Medical Decision Making  The patient's presentation was of low complexity (an acute and uncomplicated illness or injury).    The patient's evaluation involved:  history and exam without other MDM data elements    The patient's management necessitated moderate risk (a decision regarding minor procedure (ear foreign body removal) with risk factors of none).        New Prescriptions    No medications on file       Final diagnoses:   Foreign body of right ear     Disclaimer: This note consists of symbols derived from keyboarding, dictation and/or voice recognition software. As a result, there may be errors in the script that have gone undetected. Please consider this when interpreting information found in this chart.      4/6/2025   [facility] EMERGENCY DEPT       Juice Tapia PA-C  04/06/25 [id number]

## 2025-05-14 ENCOUNTER — OFFICE VISIT (OUTPATIENT)
Dept: FAMILY MEDICINE | Facility: CLINIC | Age: 65
End: 2025-05-14
Payer: COMMERCIAL

## 2025-05-14 ENCOUNTER — RESULTS FOLLOW-UP (OUTPATIENT)
Dept: FAMILY MEDICINE | Facility: CLINIC | Age: 65
End: 2025-05-14

## 2025-05-14 ENCOUNTER — ANCILLARY PROCEDURE (OUTPATIENT)
Dept: GENERAL RADIOLOGY | Facility: CLINIC | Age: 65
End: 2025-05-14
Attending: PHYSICIAN ASSISTANT
Payer: COMMERCIAL

## 2025-05-14 VITALS
HEIGHT: 70 IN | RESPIRATION RATE: 24 BRPM | OXYGEN SATURATION: 97 % | TEMPERATURE: 96 F | HEART RATE: 64 BPM | SYSTOLIC BLOOD PRESSURE: 128 MMHG | DIASTOLIC BLOOD PRESSURE: 81 MMHG | WEIGHT: 220 LBS | BODY MASS INDEX: 31.5 KG/M2

## 2025-05-14 DIAGNOSIS — R06.02 SHORTNESS OF BREATH: ICD-10-CM

## 2025-05-14 DIAGNOSIS — H72.91 PERFORATION OF RIGHT TYMPANIC MEMBRANE: ICD-10-CM

## 2025-05-14 DIAGNOSIS — F17.210 CIGARETTE NICOTINE DEPENDENCE WITHOUT COMPLICATION: ICD-10-CM

## 2025-05-14 DIAGNOSIS — R06.02 EXERTIONAL SHORTNESS OF BREATH: ICD-10-CM

## 2025-05-14 DIAGNOSIS — H71.92 CHOLESTEATOMA, LEFT: ICD-10-CM

## 2025-05-14 DIAGNOSIS — Z01.818 PREOP GENERAL PHYSICAL EXAM: Primary | ICD-10-CM

## 2025-05-14 PROBLEM — N20.0 KIDNEY STONE: Status: ACTIVE | Noted: 2025-05-14

## 2025-05-14 LAB
BASOPHILS # BLD AUTO: 0.1 10E3/UL (ref 0–0.2)
BASOPHILS NFR BLD AUTO: 1 %
EOSINOPHIL # BLD AUTO: 0.3 10E3/UL (ref 0–0.7)
EOSINOPHIL NFR BLD AUTO: 3 %
ERYTHROCYTE [DISTWIDTH] IN BLOOD BY AUTOMATED COUNT: 12.8 % (ref 10–15)
HCT VFR BLD AUTO: 53.7 % (ref 40–53)
HGB BLD-MCNC: 17.9 G/DL (ref 13.3–17.7)
IMM GRANULOCYTES # BLD: 0 10E3/UL
IMM GRANULOCYTES NFR BLD: 0 %
LYMPHOCYTES # BLD AUTO: 3.4 10E3/UL (ref 0.8–5.3)
LYMPHOCYTES NFR BLD AUTO: 39 %
MCH RBC QN AUTO: 30.4 PG (ref 26.5–33)
MCHC RBC AUTO-ENTMCNC: 33.3 G/DL (ref 31.5–36.5)
MCV RBC AUTO: 91 FL (ref 78–100)
MONOCYTES # BLD AUTO: 0.8 10E3/UL (ref 0–1.3)
MONOCYTES NFR BLD AUTO: 9 %
NEUTROPHILS # BLD AUTO: 4.2 10E3/UL (ref 1.6–8.3)
NEUTROPHILS NFR BLD AUTO: 48 %
PLATELET # BLD AUTO: 264 10E3/UL (ref 150–450)
RBC # BLD AUTO: 5.88 10E6/UL (ref 4.4–5.9)
WBC # BLD AUTO: 8.7 10E3/UL (ref 4–11)

## 2025-05-14 PROCEDURE — 71046 X-RAY EXAM CHEST 2 VIEWS: CPT | Mod: TC | Performed by: RADIOLOGY

## 2025-05-14 PROCEDURE — 93000 ELECTROCARDIOGRAM COMPLETE: CPT | Performed by: PHYSICIAN ASSISTANT

## 2025-05-14 PROCEDURE — 36415 COLL VENOUS BLD VENIPUNCTURE: CPT | Performed by: PHYSICIAN ASSISTANT

## 2025-05-14 PROCEDURE — 99214 OFFICE O/P EST MOD 30 MIN: CPT | Performed by: PHYSICIAN ASSISTANT

## 2025-05-14 PROCEDURE — 85025 COMPLETE CBC W/AUTO DIFF WBC: CPT | Performed by: PHYSICIAN ASSISTANT

## 2025-05-14 RX ORDER — CIPROFLOXACIN AND DEXAMETHASONE 3; 1 MG/ML; MG/ML
SUSPENSION/ DROPS AURICULAR (OTIC)
COMMUNITY
Start: 2025-04-16

## 2025-05-14 ASSESSMENT — PAIN SCALES - GENERAL: PAINLEVEL_OUTOF10: NO PAIN (0)

## 2025-05-14 NOTE — PROGRESS NOTES
Preoperative Evaluation  [facility]  [address] [facility] [address]  Phone: [phone]  Primary Provider: Physician No Ref-Primary  Pre-op Performing Provider: Kristen M. Kehr, PA-C  May 14, 2025             5/14/2025   Surgical Information   What procedure is being done? ear surgey    Facility or Hospital where procedure/surgery will be performed: [facility]    Who is doing the procedure / surgery? dont know    Date of surgery / procedure: june 5    Time of surgery / procedure: mid day    Where do you plan to recover after surgery? at home alone        Proxy-reported     Fax number for surgical facility: Note does not need to be faxed, will be available electronically in Epic.    Assessment & Plan     The proposed surgical procedure is considered INTERMEDIATE risk.    Preop general physical exam  Cholesteatoma left  Perforation of right tympanic membrane  Exertional shortness of breath  Cigarette nicotine dependence  Reviewed cardiac risk and EKG today.   No previous EKG to compare.   He will need to have cardiac clearance prior to the procedure. Most likely stress testing.   If Cardiology clears, then he will go forward with surgery as planned.   - XR Chest 2 Views; Future  - CBC with platelets and differential; Future  - CBC with platelets and differential  - Adult Cardiology Eval  Referral; Future        Possible Sleep Apnea:      Risks and Recommendations  The patient has the following additional risks and recommendations for perioperative complications:  Cardiovascular:   - Pre-op stress imaging recommended due to current serious signs/symptoms that would warrant stress testing regardless of preoperative status   - Cardiology consulted to clear based on symptoms    Preoperative Medication Instructions  Antiplatelet or Anticoagulation Medication Instructions   - We reviewed the medication list and the patient is not on an antiplatelet or anticoagulation  medications.    Additional Medication Instructions  Take all scheduled medications on the day of surgery    Recommendation  Approval for procedure once cardiac clearance is given    Continue regular preventative care with primary provider in [place].     Subjective   Tee is a 65 year old, presenting for the following:  Pre-Op Exam          5/14/2025     8:34 AM   Additional Questions   Roomed by Jesus YEUNG MA     HPI: Tee is here today for preop clearance for upcoming ear surgery.   He has a cholesteotoma on the left and perforated TM on the right.           5/14/2025   Pre-Op Questionnaire   Have you ever had a heart attack or stroke? No    Have you ever had surgery on your heart or blood vessels, such as a stent placement, a coronary artery bypass, or surgery on an artery in your head, neck, heart, or legs? No    Do you have chest pain with activity? No    Do you have a history of heart failure? No    Do you currently have a cold, bronchitis or symptoms of other infection? No    Do you have a cough, shortness of breath, or wheezing? (!) YES within the past month, he has had shortness of breath with exertion. Going up and down the stairs causes him to be winded.     Do you or anyone in your family have previous history of blood clots? No    Do you or does anyone in your family have a serious bleeding problem such as prolonged bleeding following surgeries or cuts? No    Have you ever had problems with anemia or been told to take iron pills? No    Have you had any abnormal blood loss such as black, tarry or bloody stools? No    Have you ever had a blood transfusion? No    Are you willing to have a blood transfusion if it is medically needed before, during, or after your surgery? Yes    Have you or any of your relatives ever had problems with anesthesia? No    Do you have sleep apnea, excessive snoring or daytime drowsiness? (!) YES    Do you have a CPAP machine? (!) NO     Do you have any artifical heart valves or  other implanted medical devices like a pacemaker, defibrillator, or continuous glucose monitor? No    Do you have artificial joints? No    Are you allergic to latex? No        Proxy-reported     Advance Care Planning        Preoperative Review of    reviewed - controlled substances prescribed by other outside provider(s).  No longer taking opiods or gabapentin. Not needed, used for an acute condition      Status of Chronic Conditions:  Patient Active Problem List    Diagnosis Date Noted    Cellulitis of perineum 01/26/2024     Priority: Medium      Past Medical History:   Diagnosis Date    Uveitis      Past Surgical History:   Procedure Laterality Date    CYSTOSCOPY, RETROGRADES, COMBINED Left 1/27/2024    Procedure: Cystoscopy, retrogrades, combined;  Surgeon: Buck Allen MD;  Location: [facility] OR    INCISION AND DRAINAGE PERINEAL, COMBINED Left 1/27/2024    Procedure: Incision and drainage perineal, combined;  Surgeon: Buck Allen MD;  Location:  OR    LASER HOLMIUM LITHOTRIPSY URETER(S), INSERT STENT, COMBINED Left 1/27/2024    Procedure: Cystoscopy, left retrograde pyelogram, interpretation of fluoroscopic images, left ureteroscopy with thulium laser lithotripsy and stone basketing, left ureteral stent placement. Incision and drainage of abscess of the left groin and perineum 22 modifier for difficult and lengthy case;  Surgeon: Buck Allen MD;  Location: [facility] OR    SOFT TISSUE SURGERY       Current Outpatient Medications   Medication Sig Dispense Refill    ciprofloxacin-dexAMETHasone (CIPRODEX) 0.3-0.1 % otic suspension       dorzolamide-timolol (COSOPT) 2-0.5 % ophthalmic solution Place 1 drop into the right eye 3 times daily. 10 mL 5    metroNIDAZOLE (METROCREAM) 0.75 % external cream Apply topically 2 times daily. 45 g 1    prednisoLONE acetate (PRED FORTE) 1 % ophthalmic suspension Place 1 drop into the right eye 3 times daily. 10 mL 5    doxycycline hyclate (VIBRAMYCIN)  "100 MG capsule Take 1 capsule (100 mg) by mouth 2 times daily. 180 capsule 0    Elastic Bandages & Supports (CURITY STRETCH BANDAGE) MISC 1 each 2 times daily 24 each 1    gabapentin (NEURONTIN) 300 MG capsule Take 1 capsule (300 mg) by mouth 3 times daily. 90 capsule 1    oxyCODONE (ROXICODONE) 5 MG tablet Take 1 tablet (5 mg) by mouth every 6 hours as needed for severe pain (Patient not taking: Reported on 3/14/2025) 12 tablet 0    valACYclovir (VALTREX) 1000 mg tablet Take 1 tablet (1,000 mg) by mouth 3 times daily. 90 tablet 1       No Known Allergies     Social History     Tobacco Use    Smoking status: Some Days     Types: Cigarettes    Smokeless tobacco: Former     Types: Chew   Substance Use Topics    Alcohol use: Not on file     History reviewed. No pertinent family history.  History   Drug Use Not on file             Review of Systems  Constitutional, HEENT, cardiovascular, pulmonary, GI, , musculoskeletal, neuro, skin, endocrine and psych systems are negative, except as otherwise noted.    Objective    /81   Pulse 64   Temp (!) 96  F (35.6  C) (Tympanic)   Resp 24   Ht 1.778 m (5' 10\")   Wt 99.8 kg (220 lb)   SpO2 97%   BMI 31.57 kg/m     Estimated body mass index is 31.57 kg/m  as calculated from the following:    Height as of this encounter: 1.778 m (5' 10\").    Weight as of this encounter: 99.8 kg (220 lb).  Physical Exam  GENERAL: alert and no distress  EYES: Eyes grossly normal to inspection, PERRL and conjunctivae and sclerae normal  HENT: normal cephalic/atraumatic, nose and mouth without ulcers or lesions, oropharynx clear, and oral mucous membranes moist  NECK: no adenopathy, no asymmetry, masses, or scars  RESP: lungs clear to auscultation - no rales, rhonchi or wheezes  CV: regular rate and rhythm, normal S1 S2, no S3 or S4, no murmur, click or rub, no peripheral edema  ABDOMEN: soft, nontender, no hepatosplenomegaly, no masses and bowel sounds normal  MS: no gross " musculoskeletal defects noted, no edema  SKIN: no suspicious lesions or rashes  NEURO: Normal strength and tone, mentation intact and speech normal  PSYCH: mentation appears normal, affect normal/bright    Recent Labs   Lab Test 06/01/24  0331 05/30/24  2322   HGB 15.8 16.0    185    138   POTASSIUM 4.1 4.1   CR 1.60* 1.61*        Diagnostics  Recent Results (from the past 24 hours)   CBC with platelets and differential    Collection Time: 05/14/25 10:24 AM   Result Value Ref Range    WBC Count 8.7 4.0 - 11.0 10e3/uL    RBC Count 5.88 4.40 - 5.90 10e6/uL    Hemoglobin 17.9 (H) 13.3 - 17.7 g/dL    Hematocrit 53.7 (H) 40.0 - 53.0 %    MCV 91 78 - 100 fL    MCH 30.4 26.5 - 33.0 pg    MCHC 33.3 31.5 - 36.5 g/dL    RDW 12.8 10.0 - 15.0 %    Platelet Count 264 150 - 450 10e3/uL    % Neutrophils 48 %    % Lymphocytes 39 %    % Monocytes 9 %    % Eosinophils 3 %    % Basophils 1 %    % Immature Granulocytes 0 %    Absolute Neutrophils 4.2 1.6 - 8.3 10e3/uL    Absolute Lymphocytes 3.4 0.8 - 5.3 10e3/uL    Absolute Monocytes 0.8 0.0 - 1.3 10e3/uL    Absolute Eosinophils 0.3 0.0 - 0.7 10e3/uL    Absolute Basophils 0.1 0.0 - 0.2 10e3/uL    Absolute Immature Granulocytes 0.0 <=0.4 10e3/uL      EKG: sinus bradycardia, probable old infarct, no acute findings / ischemia, no LVH by voltage criteria    Revised Cardiac Risk Index (RCRI)  The patient has the following serious cardiovascular risks for perioperative complications:   - High risk surgery (>5% cardiac complication risk) = 1 point     RCRI Interpretation: 1 point: Class II (low risk - 0.9% complication rate)         Signed Electronically by: Kristen M. Kehr, PA-C  A copy of this evaluation report is provided to the requesting physician.

## 2025-05-14 NOTE — PATIENT INSTRUCTIONS

## 2025-05-21 ENCOUNTER — PRE VISIT (OUTPATIENT)
Dept: OTOLARYNGOLOGY | Facility: CLINIC | Age: 65
End: 2025-05-21

## 2025-05-29 ENCOUNTER — OFFICE VISIT (OUTPATIENT)
Dept: CARDIOLOGY | Facility: CLINIC | Age: 65
End: 2025-05-29
Attending: PHYSICIAN ASSISTANT
Payer: COMMERCIAL

## 2025-05-29 VITALS
WEIGHT: 218.8 LBS | HEART RATE: 54 BPM | BODY MASS INDEX: 31.39 KG/M2 | RESPIRATION RATE: 16 BRPM | SYSTOLIC BLOOD PRESSURE: 138 MMHG | DIASTOLIC BLOOD PRESSURE: 74 MMHG

## 2025-05-29 DIAGNOSIS — R06.02 EXERTIONAL SHORTNESS OF BREATH: ICD-10-CM

## 2025-05-29 DIAGNOSIS — Z01.818 PREOP GENERAL PHYSICAL EXAM: ICD-10-CM

## 2025-05-29 NOTE — CONFIDENTIAL NOTE
HEART CARE ENCOUNTER CONSULTATON NOTE      [facility]  [phone]      Assessment/Recommendations   Assessment:  1.  Preoperative cardiac evaluation: Patient is noting shortness of breath with exertion that is new compared to prior.  He has been contributing this to deconditioning.  Denies chest pain.  Will proceed with an exercise stress echo for further evaluation and assessment of cardiac risk prior to surgery.  Surgery scheduled for 6/5/2025.  2.  Chronic bilateral ear disease scheduled for left ear mastoidectomy and reconstruction 6/5/2025  3.  Current tobacco abuse  4.  History of MRSA infections  5.  Chronic kidney disease stage IIIb    Plan:  1.  Exercise stress echocardiogram  2.  If no high risk findings on stress testing may proceed with surgery as planned.  No further cardiac evaluation needed       History of Present Illness/Subjective    HPI: Tee Escudero is a 65 year old male history of tobacco abuse (1 pack every 2 weeks), chronic kidney disease stage IIIb, MRSA infections, recent shingles infection involving the right side of his face who I am seeing today for preoperative cardiac evaluation prior to left mastoidectomy with reconstruction by ENT.  He will eventually need both the ears done.  He is originally from [place] and moved here to be very involved in the [employer].  He enjoys the [employer] in his youth  and .  He also is part of the [employer] softball team.  He notes that when he runs a basis he feels short of breath and also feels short of breath with other types of exertion like walking upstairs.  Denies any chest pain.  He smokes a pack of cigarettes every couple weeks.  Denies any chest pain.  EKG on 5/14/2025 shows sinus bradycardia 59 otherwise unremarkable.  No significant family history of heart disease.           Physical Examination  Review of Systems   Vitals: /74 (BP Location: Right arm, Patient Position: Sitting, Cuff Size: Adult  Large)   Pulse 54   Resp 16   Wt 99.2 kg (218 lb 12.8 oz)   BMI 31.39 kg/m    BMI= Body mass index is 31.39 kg/m .  Wt Readings from Last 3 Encounters:   05/29/25 99.2 kg (218 lb 12.8 oz)   05/14/25 99.8 kg (220 lb)   04/06/25 99.8 kg (220 lb)       General Appearance:   no distress, normal body habitus   ENT/Mouth: membranes moist, no oral lesions or bleeding gums.      EYES:  no scleral icterus, normal conjunctivae   Neck: no carotid bruits or thyromegaly   Chest/Lungs:   lungs are clear to auscultation   Cardiovascular:   Regular. Normal first and second heart sounds with no murmur no edema bilaterally    Abdomen:  no organomegaly, masses, bruits, or tenderness; bowel sounds are present   Extremities: no cyanosis or clubbing   Skin: no xanthelasma, warm.    Neurologic: normal  bilateral, no tremors     Psychiatric: alert and oriented x3, calm        Please refer above for cardiac ROS details.        Medical History  Surgical History Family History Social History   Past Medical History:   Diagnosis Date    Uveitis      Past Surgical History:   Procedure Laterality Date    CYSTOSCOPY, RETROGRADES, COMBINED Left 1/27/2024    Procedure: Cystoscopy, retrogrades, combined;  Surgeon: Buck Allen MD;  Location: [facility] OR    INCISION AND DRAINAGE PERINEAL, COMBINED Left 1/27/2024    Procedure: Incision and drainage perineal, combined;  Surgeon: Buck Allen MD;  Location:  OR    LASER HOLMIUM LITHOTRIPSY URETER(S), INSERT STENT, COMBINED Left 1/27/2024    Procedure: Cystoscopy, left retrograde pyelogram, interpretation of fluoroscopic images, left ureteroscopy with thulium laser lithotripsy and stone basketing, left ureteral stent placement. Incision and drainage of abscess of the left groin and perineum 22 modifier for difficult and lengthy case;  Surgeon: Buck Allen MD;  Location: [facility] OR    SOFT TISSUE SURGERY       No family history on file.     Social History     Socioeconomic  History    Marital status: Single     Spouse name: Not on file    Number of children: Not on file    Years of education: Not on file    Highest education level: Not on file   Occupational History    Not on file   Tobacco Use    Smoking status: Some Days     Types: Cigarettes    Smokeless tobacco: Former     Types: Chew   Vaping Use    Vaping status: Never Used   Substance and Sexual Activity    Alcohol use: Not on file    Drug use: Not on file    Sexual activity: Not on file   Other Topics Concern    Not on file   Social History Narrative    Not on file     Social Drivers of Health     Financial Resource Strain: High Risk (5/14/2025)    Financial Resource Strain     Within the past 12 months, have you or your family members you live with been unable to get utilities (heat, electricity) when it was really needed?: Yes   Food Insecurity: High Risk (5/14/2025)    Food Insecurity     Within the past 12 months, did you worry that your food would run out before you got money to buy more?: Yes     Within the past 12 months, did the food you bought just not last and you didn t have money to get more?: Yes   Transportation Needs: Low Risk  (5/14/2025)    Transportation Needs     Within the past 12 months, has lack of transportation kept you from medical appointments, getting your medicines, non-medical meetings or appointments, work, or from getting things that you need?: No   Physical Activity: Not on file   Stress: Not on file   Social Connections: Not on file   Interpersonal Safety: Low Risk  (1/10/2025)    Interpersonal Safety     Do you feel physically and emotionally safe where you currently live?: Yes     Within the past 12 months, have you been hit, slapped, kicked or otherwise physically hurt by someone?: No     Within the past 12 months, have you been humiliated or emotionally abused in other ways by your partner or ex-partner?: No   Housing Stability: Low Risk  (5/14/2025)    Housing Stability     Do you have  "housing? : Yes     Are you worried about losing your housing?: No           Medications  Allergies   Current Outpatient Medications   Medication Sig Dispense Refill    ciprofloxacin-dexAMETHasone (CIPRODEX) 0.3-0.1 % otic suspension  (Patient not taking: Reported on 5/29/2025)      dorzolamide-timolol (COSOPT) 2-0.5 % ophthalmic solution Place 1 drop into the right eye 3 times daily. (Patient not taking: Reported on 5/29/2025) 10 mL 5    metroNIDAZOLE (METROCREAM) 0.75 % external cream Apply topically 2 times daily. 45 g 1    prednisoLONE acetate (PRED FORTE) 1 % ophthalmic suspension Place 1 drop into the right eye 3 times daily. (Patient not taking: Reported on 5/29/2025) 10 mL 5     No Known Allergies       Lab Results    Chemistry/lipid CBC Cardiac Enzymes/BNP/TSH/INR   No results for input(s): \"CHOL\", \"HDL\", \"LDL\", \"TRIG\", \"CHOLHDLRATIO\" in the last 14935 hours.  No results for input(s): \"LDL\" in the last 49072 hours.  Recent Labs   Lab Test 06/01/24  0331      POTASSIUM 4.1   CHLORIDE 101   CO2 26   *   BUN 13.7   CR 1.60*   GFRESTIMATED 48*   TEMITOPE 8.8     Recent Labs   Lab Test 06/01/24  0331 05/30/24 2322 01/31/24  1435   CR 1.60* 1.61* 1.44*     No results for input(s): \"A1C\" in the last 61827 hours.       Recent Labs   Lab Test 05/14/25  1024   WBC 8.7   HGB 17.9*   HCT 53.7*   MCV 91        Recent Labs   Lab Test 05/14/25  1024 06/01/24 0331 05/30/24 2322   HGB 17.9* 15.8 16.0    No results for input(s): \"TROPONINI\" in the last 64351 hours.  No results for input(s): \"BNP\", \"NTBNPI\", \"NTBNP\" in the last 61392 hours.  No results for input(s): \"TSH\" in the last 71853 hours.  Recent Labs   Lab Test 01/26/24  1935   INR 1.10        Yodit Louise MD                                      "

## 2025-05-30 ENCOUNTER — HOSPITAL ENCOUNTER (OUTPATIENT)
Dept: CARDIOLOGY | Facility: CLINIC | Age: 65
Discharge: HOME | End: 2025-05-30
Attending: INTERNAL MEDICINE | Admitting: INTERNAL MEDICINE
Payer: COMMERCIAL

## 2025-05-30 DIAGNOSIS — R06.02 EXERTIONAL SHORTNESS OF BREATH: ICD-10-CM

## 2025-05-30 DIAGNOSIS — Z01.818 PREOP GENERAL PHYSICAL EXAM: ICD-10-CM

## 2025-05-30 PROCEDURE — 255N000002 HC RX 255 OP 636: Performed by: INTERNAL MEDICINE

## 2025-05-30 PROCEDURE — 93325 DOPPLER ECHO COLOR FLOW MAPG: CPT | Mod: TC

## 2025-05-30 PROCEDURE — 93018 CV STRESS TEST I&R ONLY: CPT | Performed by: INTERNAL MEDICINE

## 2025-05-30 PROCEDURE — 93016 CV STRESS TEST SUPVJ ONLY: CPT | Performed by: INTERNAL MEDICINE

## 2025-05-30 PROCEDURE — 93325 DOPPLER ECHO COLOR FLOW MAPG: CPT | Mod: 26 | Performed by: INTERNAL MEDICINE

## 2025-05-30 PROCEDURE — 93350 STRESS TTE ONLY: CPT | Mod: 26 | Performed by: INTERNAL MEDICINE

## 2025-05-30 PROCEDURE — 93321 DOPPLER ECHO F-UP/LMTD STD: CPT | Mod: 26 | Performed by: INTERNAL MEDICINE

## 2025-05-30 RX ADMIN — PERFLUTREN 3 ML: 6.52 INJECTION, SUSPENSION INTRAVENOUS at 09:57

## 2025-06-02 ENCOUNTER — OFFICE VISIT (OUTPATIENT)
Dept: FAMILY MEDICINE | Facility: CLINIC | Age: 65
End: 2025-06-02
Payer: COMMERCIAL

## 2025-06-02 ENCOUNTER — TELEPHONE (OUTPATIENT)
Dept: CARDIOLOGY | Facility: CLINIC | Age: 65
End: 2025-06-02

## 2025-06-02 VITALS
TEMPERATURE: 97.8 F | HEIGHT: 70 IN | DIASTOLIC BLOOD PRESSURE: 72 MMHG | OXYGEN SATURATION: 97 % | WEIGHT: 218.06 LBS | SYSTOLIC BLOOD PRESSURE: 128 MMHG | BODY MASS INDEX: 31.22 KG/M2 | HEART RATE: 70 BPM

## 2025-06-02 DIAGNOSIS — R06.09 DOE (DYSPNEA ON EXERTION): Primary | ICD-10-CM

## 2025-06-02 DIAGNOSIS — F17.200 NICOTINE DEPENDENCE, UNCOMPLICATED, UNSPECIFIED NICOTINE PRODUCT TYPE: ICD-10-CM

## 2025-06-02 DIAGNOSIS — Z23 NEED FOR VACCINATION: ICD-10-CM

## 2025-06-02 DIAGNOSIS — Z12.11 SCREEN FOR COLON CANCER: ICD-10-CM

## 2025-06-02 PROCEDURE — 99406 BEHAV CHNG SMOKING 3-10 MIN: CPT | Mod: 4MD | Performed by: PHYSICIAN ASSISTANT

## 2025-06-02 PROCEDURE — 99213 OFFICE O/P EST LOW 20 MIN: CPT | Mod: 25 | Performed by: PHYSICIAN ASSISTANT

## 2025-06-02 PROCEDURE — 90471 IMMUNIZATION ADMIN: CPT | Performed by: PHYSICIAN ASSISTANT

## 2025-06-02 PROCEDURE — 90715 TDAP VACCINE 7 YRS/> IM: CPT | Performed by: PHYSICIAN ASSISTANT

## 2025-06-02 RX ORDER — VARENICLINE TARTRATE 0.5 (11)-1
KIT ORAL
Qty: 53 TABLET | Refills: 0 | Status: SHIPPED | OUTPATIENT
Start: 2025-06-02

## 2025-06-02 RX ORDER — POLYETHYLENE GLYCOL 3350 17 G
2 POWDER IN PACKET (EA) ORAL
Qty: 24 LOZENGE | Refills: 5 | Status: SHIPPED | OUTPATIENT
Start: 2025-06-02

## 2025-06-02 ASSESSMENT — PAIN SCALES - GENERAL: PAINLEVEL_OUTOF10: NO PAIN (0)

## 2025-06-02 NOTE — PROGRESS NOTES
Assessment & Plan     RUSH (dyspnea on exertion)  He is waiting for cardiology to review the stress test. Reassured that they will review and communicate with his surgical team.     Nicotine dependence, uncomplicated, unspecified nicotine product type  Smoking sporadically. Discussed cessation. He may do well with nicotine replacement for the 1-2 cig he has per day. Did also discuss chantix which he may be interested in- ok to combine w/nicotine replacement. Discussed possible side effects. Rx sent to the pharmacy.   - SMOKING CESSATION COUNSELING 3-10 MIN  - varenicline (CHANTIX SHEBA) 0.5 MG X 11 & 1 MG X 42 tablet; Take 0.5 mg tab daily for 3 days, THEN 0.5 mg tab twice daily for 4 days, THEN 1 mg twice daily.  - nicotine (COMMIT) 2 MG lozenge; Place 1 lozenge (2 mg) inside cheek every hour as needed for nicotine withdrawal symptoms.    Screen for colon cancer  He has never had screening. He is agreeable to scope. He would like to do it in [place]  - Colonoscopy Screening  Referral; Future    Need for vaccination  He is agreeable to Tdap today   Declines prevnar.          Nicotine/Tobacco Cessation  He reports that he has been smoking cigarettes. He has never been exposed to tobacco smoke. He has quit using smokeless tobacco.  His smokeless tobacco use included chew.  Nicotine/Tobacco Cessation Plan  See above        Follow Up: see above. Additionally patient was instructed to contact clinic for worsening symptoms, non-improvement in time frame discussed, and for questions regarding treatment plan.   For virtual visits, the patient was advised to be seen for in person evaluation if symptoms or condition are worsening or non-improvement as expected.   Jessie Dobbins PA-C    Robby Oliveira is a 65 year old, presenting for the following health issues:  Results      6/2/2025    10:24 AM   Additional Questions   Roomed by VE     History of Present Illness       Reason for visit:  Pre opp  got my  "stress test done    He eats 2-3 servings of fruits and vegetables daily.He consumes 2 sweetened beverage(s) daily.He exercises with enough effort to increase his heart rate 9 or less minutes per day.  He exercises with enough effort to increase his heart rate 4 days per week.   He is taking medications regularly.        Discuss recent Stress Test results  I play softball for my [employer]. When I ran to 3rd base I felt a little more short of breath. Mentioned this at preop with Kristin Kehr PA-C. She referred him to cardiology. Had consult with  who ordered the stress test. He did the stress test. He is waiting for her to comment on the results and let him know if he is cleared for surgery.     I don't have a primary. Has had a lot of issues this year.     Had MRSA in 2023.   Kidney stones in Jan 2024.   Shingles on the face summer 2024.   Has not had a primary and was told to get one.     Tobacco use. Smoking pack- lasts about a month. Never tried a medication to help stop smoking. Would be interested in chantix. When I get stressed I smoke and it helps.     Screening for colon cancer- I can't remember. No fam hx.   Will do Tdap. He declines prevnar today.     From [place]. New to [place]. Works as  in [employer].         Review of Systems  Constitutional, HEENT, cardiovascular, pulmonary, gi and gu systems are negative, except as otherwise noted.      Objective    /72 (BP Location: Left arm, Patient Position: Chair, Cuff Size: Adult Large)   Pulse 70   Temp 97.8  F (36.6  C) (Temporal)   Ht 1.778 m (5' 10\")   Wt 98.9 kg (218 lb 1 oz)   SpO2 97%   BMI 31.29 kg/m    Body mass index is 31.29 kg/m .  Physical Exam     GENERAL: alert and no distress  EYES: Eyes grossly normal to inspection, PERRL and conjunctivae and sclerae normal  NECK: no adenopathy, no asymmetry, masses, or scars  RESP: lungs clear to auscultation - no rales, rhonchi or wheezes  CV: regular rate and rhythm, " normal S1 S2, no S3 or S4, no murmur, click or rub, no peripheral edema  MS: no gross musculoskeletal defects noted, no edema            Signed Electronically by: Jessie Dobbins PA-C

## 2025-06-02 NOTE — PATIENT INSTRUCTIONS
Instructions from Today's Visit:  Call Dr. Louise's office - she will tell you if you are cleared for surgery based on your stress test results.      Tdap given today     You may do well with nicotine replacement. The nicorette lozenge. Take a lozenge instead of a cigarette when you feel the urge to smoke.   Chantix pill to quit smoking. Once you are through with the starter pack, then let us know and I will send I the maintenance dosing.     Colonoscopy ordered to be done at [facility].        General Instructions After Your Visit  If you have been seen for a concern and are worsening or not improving as expected, please schedule a follow-up visit or reach out to a member of our care team or nurses if it is urgent.  We have Nurse advice available 24/7 by calling [phone].  For emergencies, please call [phone].    My Clinic Hours  I am in the office Mondays, Wednesdays, and Thursdays. Messages received outside of normal clinic hours and on my days out of the office will be reviewed by our [place] care team, but may not be addressed by me personally until I am back in the office. If you have concerns that cannot wait for my return please call the Nurse advise line [phone].    Test results  You may see your lab or test results before we can make recommendations. This is common, as sometimes we are awaiting on other labs to return or we are out of office on a particular day. Please be patient, and if you don't see a response from me or one of my colleagues within 2-3 business days, and you have a specific concern, please send us a message.    Refills  If you have run out of refills, please schedule a visit. This is generally an indication that you are due for a follow-up visit. All prescriptions are only valid for 1 year from the date written and need a visit annually to renew. Most mental health and chronic diseases we are treating (diabetes, high blood pressure, etc) require some type of visit every 6 months.  We are now offering video visits! However, if physical exams are needed or it is a complex concern, we may ask you to be seen in person.    Physicals & Preventative Visits   These appointment slots fill up fast. Please consider scheduling these 2-3 months in advance to allow for the appointment time that fits you best. If you have medications ordered or other issues addressed that are not preventive at these visits, please be aware there are extra costs associated with this.

## 2025-06-02 NOTE — TELEPHONE ENCOUNTER
[facility] Call Center    Phone Message    May a detailed message be left on voicemail: yes     Reason for Call: Other: [facility] please read echo stress results ASAP, so they can pull that information via care everywhere for the pt.. They had pt call over to [facility] to request this on their behalf.      Action Taken: Other: cardiology     Travel Screening: Not Applicable      Thank you!  [facility]        Date of Service:

## 2025-06-03 ENCOUNTER — TELEPHONE (OUTPATIENT)
Dept: FAMILY MEDICINE | Facility: CLINIC | Age: 65
End: 2025-06-03
Payer: COMMERCIAL

## 2025-06-03 ENCOUNTER — RESULTS FOLLOW-UP (OUTPATIENT)
Dept: CARDIOLOGY | Facility: CLINIC | Age: 65
End: 2025-06-03

## 2025-06-03 NOTE — TELEPHONE ENCOUNTER
FYI - Status Update    Who [facility]    Update: Kristen Kehr will need to addend Preop Evaluation dated 5/14/25 in order to clear patient for surgery on Thursday, June 5, 2025.    Does caller want a call/response back: Yes     Could we send this information to you in iWant[employer] or would you prefer to receive a phone call?:   Please call [facility] once Preop Evaluation has been updated.

## 2025-06-03 NOTE — TELEPHONE ENCOUNTER
I left a detailed message for the patient, on a VM that identified him, with the providers message below. I left [phone] if there were questions.  Thank you. Carrie Alvarado R.N.

## 2025-06-03 NOTE — TELEPHONE ENCOUNTER
Kristen Kehr, PA-C - please see note from Dr. Louise, clearing patient for surgery. Note is in echo results. Does it need to be in an alternative format?   Please update preop note and   Once updated, please let RN team know so team can contact Same Day Surgery team with update.   RN team to also contact patient to update him on status of clearance for surgery.     Background:  Per chart review, patient spoke with a nurse with Cardiology team today, 6/3/25 at 12:36 pm. Their team reached out to Dr. Yodit oLuise as of 12:38 pm today to please address stress echo results and provide statement for cardiac clearance for surgery if appropriate.   Writer reached out to Dr. Louise's RN team via [email or web address] secure chat to find out when we can anticipate update from Cardiology.   Was advised they are hoping to have an answer today and a letter from Dr. Louise will go in Epic.   Within a few minutes, Dr. Louise updated Echo Stress result notes with the following message:   Echo Stress Echocardiogram: Result Notes     Yodit Louise MD  6/3/2025  2:46 PM CDT       Reviewed exercise stress echocardiogram.  Stress test was normal showing no evidence for ischemia.  Read as slightly submaximal however patient was able to do well over 4 METS of exercise without symptoms and stress test was normal.  May proceed with surgery as scheduled.  No further cardiac workup needed at this time and no cardiac contraindications.     Angely Correia, RN, BSN, PHN  [facility] [place]

## 2025-06-03 NOTE — TELEPHONE ENCOUNTER
"Phone call to pt. Pt states in addition to Dr. Louise reviewing 5/30 Stress Echo results, patient also needs a letter of \"Cardiac Clearance\" for his upcoming Left Ear Reconstruction surgery on 6/5/25.     Reassured pt will again send request to Dr. Louise to be addressed ASAP. Patient also requested letter be faxed to Rose Osborn MD with [facility] to [facility] who will be completing his surgery. LMS  "

## 2025-06-03 NOTE — TELEPHONE ENCOUNTER
I left a message to return a call to [phone] to inform Tee of the provider's note.   Carrie Blake from same day surgery was notified of the provider's message as written below.  Thank you. Carrie Alvarado R.N.

## 2025-06-03 NOTE — TELEPHONE ENCOUNTER
I added an addendum to the preop note from 5/24/2025. Since cardiac testing is completed and he is cleared from a cardiac standpoint, he is cleared for surgery.   Kristen Kehr PA-C

## 2025-06-03 NOTE — TELEPHONE ENCOUNTER
----- Message -----  From: Yodit Louise MD  Sent: 6/3/2025   2:46 PM CDT  To: Daphne Wallace RN    I sent you a message through the results section    ----- Message -----  From: Daphne Wallace RN  Sent: 6/3/2025  12:38 PM CDT  To: Yodit Louise MD    Please address 5/30 Stress Echo Results ASAP and provide statement for Cardiac Clearance for surgery on 6/5 if appropriate. Thanks! LMS    ==  Noted update from [facility]. Confirmed statement viewable under 5/30/25 Stress Echo results. Please review 5/30 Stress Echo results for further follow-up. Message sent to pt's surgery team via Secure Chat:        SANDRA Au will follow-up with pt and notify surgeon of update. LMS

## 2025-06-03 NOTE — TELEPHONE ENCOUNTER
[facility] Call Center    Phone Message    May a detailed message be left on voicemail: yes     Reason for Call: Other: Pt called  back to follow up on provider altagracia  reading stress test results. They need to know as soon as possible due to surgery being on 06/05/2025.     Action Taken: Other: cardio    Travel Screening: Not Applicable     Date of Service:     Thank you!  [facility]

## 2025-06-06 ENCOUNTER — HOSPITAL ENCOUNTER (EMERGENCY)
Facility: CLINIC | Age: 65
Discharge: HOME | End: 2025-06-06
Attending: FAMILY MEDICINE | Admitting: FAMILY MEDICINE
Payer: COMMERCIAL

## 2025-06-06 VITALS
HEART RATE: 67 BPM | DIASTOLIC BLOOD PRESSURE: 84 MMHG | SYSTOLIC BLOOD PRESSURE: 124 MMHG | TEMPERATURE: 98.7 F | WEIGHT: 223.1 LBS | OXYGEN SATURATION: 96 % | RESPIRATION RATE: 18 BRPM | HEIGHT: 71 IN | BODY MASS INDEX: 31.23 KG/M2

## 2025-06-06 DIAGNOSIS — H95.41 POSTOPERATIVE HEMORRHAGE OF EAR FOLLOWING PROCEDURE ON EAR: ICD-10-CM

## 2025-06-06 PROCEDURE — 99283 EMERGENCY DEPT VISIT LOW MDM: CPT | Performed by: FAMILY MEDICINE

## 2025-06-06 PROCEDURE — 99282 EMERGENCY DEPT VISIT SF MDM: CPT | Performed by: FAMILY MEDICINE

## 2025-06-06 NOTE — DISCHARGE INSTRUCTIONS
Contact your ear nose and throat physician later on today for follow-up on your visit today.  There is no active bleeding, leave the bandage in place.    CONTACT INFORMATION    If it is after hours call the Careline at [phone].  [facility]: Please contact your clinic during regular business hours or in case of an Emergency dial 911.  ENT Clinic, [phone],

## 2025-06-06 NOTE — ED TRIAGE NOTES
Pt presents with concerns to left ear issue after surgery yesterday. Pt concerned stitches tore d/t increased bleeding overnight. Pain 7 out of 10, NORCO last taken 5pm yesterday.    Pt states he wants surgical site looked at and redressed      Triage Assessment (Adult)       Row Name 06/06/25 0622          Triage Assessment    Airway WDL WDL        Respiratory WDL    Respiratory WDL WDL        Cardiac WDL    Cardiac WDL WDL        Peripheral/Neurovascular WDL    Peripheral Neurovascular WDL WDL        Cognitive/Neuro/Behavioral WDL    Cognitive/Neuro/Behavioral WDL WDL

## 2025-06-06 NOTE — ED PROVIDER NOTES
History     Chief Complaint   Patient presents with    Post-op Problem     HPI  Tee Escudero is a 65 year old male who presents with concerns of some bleeding from a recent ear procedure.  Patient had ear surgery done at another facility yesterday.  Patient states when he went to bed and he woke up he felt a lot of blood down the side of his face.  He is worried that he pushed his ear up and the stitches ripped.  He is having a lot of pain there.  Denies any fevers or chills.  He did not contact his ear nose and throat doctor group and just came here.    Allergies:  No Known Allergies    Problem List:    Patient Active Problem List    Diagnosis Date Noted    Kidney stone 05/14/2025     Priority: Medium    Cigarette nicotine dependence without complication 05/14/2025     Priority: Medium    Cholesteatoma, left 05/14/2025     Priority: Medium    Perforation of right tympanic membrane 05/14/2025     Priority: Medium    Cellulitis of perineum 01/26/2024     Priority: Medium        Past Medical History:    Past Medical History:   Diagnosis Date    Uveitis        Past Surgical History:    Past Surgical History:   Procedure Laterality Date    CYSTOSCOPY, RETROGRADES, COMBINED Left 1/27/2024    Procedure: Cystoscopy, retrogrades, combined;  Surgeon: Buck Allen MD;  Location:  OR    INCISION AND DRAINAGE PERINEAL, COMBINED Left 1/27/2024    Procedure: Incision and drainage perineal, combined;  Surgeon: Buck Allen MD;  Location:  OR    LASER HOLMIUM LITHOTRIPSY URETER(S), INSERT STENT, COMBINED Left 1/27/2024    Procedure: Cystoscopy, left retrograde pyelogram, interpretation of fluoroscopic images, left ureteroscopy with thulium laser lithotripsy and stone basketing, left ureteral stent placement. Incision and drainage of abscess of the left groin and perineum 22 modifier for difficult and lengthy case;  Surgeon: Buck Allen MD;  Location:  OR    SOFT TISSUE SURGERY    "      Family History:    Family History   Problem Relation Age of Onset    Lung Cancer Mother     Other Problems  (asbesto) Father        Social History:  Marital Status:  Single [1]  Social History     Tobacco Use    Smoking status: Some Days     Types: Cigarettes     Passive exposure: Never    Smokeless tobacco: Former     Types: Chew   Vaping Use    Vaping status: Never Used   Substance Use Topics    Alcohol use: Never    Drug use: Never        Medications:    ciprofloxacin-dexAMETHasone (CIPRODEX) 0.3-0.1 % otic suspension  dorzolamide-timolol (COSOPT) 2-0.5 % ophthalmic solution  metroNIDAZOLE (METROCREAM) 0.75 % external cream  nicotine (COMMIT) 2 MG lozenge  prednisoLONE acetate (PRED FORTE) 1 % ophthalmic suspension  varenicline (CHANTIX SHEBA) 0.5 MG X 11 & 1 MG X 42 tablet          Review of Systems   All other systems reviewed and are negative.      Physical Exam   BP: 124/84  Pulse: 67  Temp: 98.7  F (37.1  C)  Resp: 20  Height: 180.3 cm (5' 11\")  Weight: 101.2 kg (223 lb 1.6 oz)  SpO2: 96 %      Physical Exam  Vitals reviewed.   Constitutional:       General: He is not in acute distress.     Appearance: Normal appearance.   HENT:      Head: Normocephalic.      Ears:      Comments: There is a sponge in place in the ear, it seems to have a blood in it but there is no active bleeding noted.  Stitches on the bottom part of the ear and around the side of the ear are all intact there is no bleeding coming from behind or below the ear.  Neurological:      Mental Status: He is alert.         ED Course        Procedures           No results found for this or any previous visit (from the past 24 hours).    Medications - No data to display    I left a sponge in place in the ear and put a new gauze on the outside of the ear and wrapped the ear back with pressure as before.  I encouraged the patient to contact his ear nose and throat doctor later on today for follow-up but there is no signs of any active bleeding " right now.  Patient was reassured and discharged at this time.    Assessments & Plan (with Medical Decision Making)  Postoperative hemorrhage of ear following procedure on ear     I have reviewed the nursing notes.    I have reviewed the findings, diagnosis, plan and need for follow up with the patient.      New Prescriptions    No medications on file       Final diagnoses:   Postoperative hemorrhage of ear following procedure on ear       6/6/2025   [facility] EMERGENCY DEPT       Tae Kamara MD  06/06/25 0608

## 2025-06-09 ENCOUNTER — TELEPHONE (OUTPATIENT)
Dept: GASTROENTEROLOGY | Facility: CLINIC | Age: 65
End: 2025-06-09
Payer: COMMERCIAL

## 2025-06-09 NOTE — TELEPHONE ENCOUNTER
"Endoscopy Scheduling Screen    Caller: patient    Have you had any respiratory illness or flu-like symptoms in the last 10 days?  No    What is your communication preference for Instructions and/or Bowel Prep?   MyChart    What insurance is in the chart?  Other:  [employer]    Ordering/Referring Provider: RHONDA CHRISTINA   (If ordering provider performs procedure, schedule with ordering provider unless otherwise instructed. )    BMI: Estimated body mass index is 31.12 kg/m  as calculated from the following:    Height as of 6/6/25: 1.803 m (5' 11\").    Weight as of 6/6/25: 101.2 kg (223 lb 1.6 oz).     Sedation Ordered  moderate sedation.   If patient BMI > 50 do not schedule in [facility].    If patient BMI > 45 do not schedule at [facility].    Are you taking methadone or Suboxone?  NO, No RN review required.    Have you been diagnosed and are being treated for severe PTSD or severe anxiety?  NO, No RN review required.    Are you taking any prescription medications for pain 3 or more times per week?   NO, No RN review required.    Do you have a history of malignant hyperthermia?  No    (Females) Are you currently pregnant?   No     Have you been diagnosed or told you have pulmonary hypertension?   No    Do you have an LVAD?  No    Have you been told you have moderate to severe sleep apnea?  No.    Have you been told you have COPD, asthma, or any other lung disease?  No    Has your doctor ordered any cardiac tests like echo, angiogram, stress test, ablation, or EKG, that you have not completed yet?  No    Do you  have a history of any heart conditions?  No     Have you ever had or are you waiting for an organ transplant?  No. Continue scheduling, no site restrictions.    Have you had a stroke or transient ischemic attack (TIA aka \"mini stroke\") in the last 2 years?   No.    Have you been diagnosed with or been told you have cirrhosis of the liver?   No.    Are you currently on dialysis?   No    Do you need assistance " "transferring?   No    BMI: Estimated body mass index is 31.12 kg/m  as calculated from the following:    Height as of 6/6/25: 1.803 m (5' 11\").    Weight as of 6/6/25: 101.2 kg (223 lb 1.6 oz).     Is patients BMI > 40 and scheduling location [facility]?  No    Do you take an injectable or oral medication for weight loss or diabetes (excluding insulin)?  No    Do you take the medication Naltrexone?  No    Do you take blood thinners?  No       Prep   Are you currently on dialysis or do you have chronic kidney disease?  No    Do you have a diagnosis of diabetes?  No    Do you have a diagnosis of cystic fibrosis (CF)?  No    On a regular basis do you go 3 -5 days between bowel movements?  Yes (Extended Prep)    BMI > 40?  No    Preferred Pharmacy:    [facility] [place] - [address] [place] [address]  Phone: [phone] Fax: [phone]    Final Scheduling Details     Procedure scheduled  Colonoscopy    Surgeon:  JESSENIA     Date of procedure:  6/23/25     Pre-OP / PAC:   No - Not required for this site.    Location  [facility] - Per order.    Sedation   MAC/Deep Sedation Per location.      Patient Reminders:   You will receive a call from a Nurse to review instructions and health history.  This assessment must be completed prior to your procedure.  Failure to complete the Nurse assessment may result in the procedure being cancelled.      On the day of your procedure, please designate an adult(s) who can drive you home stay with you for the next 24 hours. The medicines used in the exam will make you sleepy. You will not be able to drive.      You cannot take public transportation, ride share services, or non-medical taxi service without a responsible caregiver.  Medical transport services are allowed with the requirement that a responsible caregiver will receive you at your destination.  We require that drivers and caregivers are confirmed prior to your procedure.    "

## 2025-06-09 NOTE — TELEPHONE ENCOUNTER
Pre visit planning completed.      Procedure details:    Patient scheduled for Colonoscopy on 6/23/25.     Arrival time: 0830. Procedure time 0930    Facility location: [facility]; [address] , [place] [address]. Check in location: Main entrance at Surgery registation desk.    Sedation type: MAC    Pre op exam needed? No.    Indication for procedure: Screen      Chart review:     Electronic implanted devices? No    Recent diagnosis of diverticulitis within the last 6 weeks? No      Medication review:    Diabetic? No    Anticoagulants? No    Weight loss medication/injectable? No GLP-1 medication per patient's medication list. Nursing to verify with pre-assessment call.    Other medication HOLDING recommendations:  N/A      Prep for procedure:     Bowel prep recommendation: Standard Miralax.   Due to: standard bowel prep    Procedure information and instructions sent via [employer]         Kiera Guerra RN  Endoscopy Procedure Pre Assessment   [phone] option 3

## 2025-06-10 NOTE — TELEPHONE ENCOUNTER
Pre assessment completed for upcoming procedure.   (Please see previous telephone encounter notes for complete details)    Patient returned call.       Procedure details:    Procedure date 6.23.25, arrival time 0830 and facility location reviewed.    Pre op exam needed? No.    Designated  policy reviewed. Instructed to have someone stay 24  hours post procedure.       Medication review:    Medications reviewed. Please see supporting documentation below. Holding recommendations discussed (if applicable).       Prep for procedure:     Procedure prep instructions reviewed.        Any additional information needed:  N/A      Patient verbalized understanding and had no questions or concerns at this time.      Janie Pendleton RN  Endoscopy Procedure Pre Assessment   [phone] option 3

## 2025-06-16 ENCOUNTER — DOCUMENTATION ONLY (OUTPATIENT)
Dept: INFECTION CONTROL | Facility: CLINIC | Age: 65
End: 2025-06-16
Payer: COMMERCIAL

## 2025-06-16 NOTE — PROGRESS NOTES
Infection Prevention Progress Note  6/16/2025      Patient Name: Tee Escudero [id number]  Admit Date: (Not on file)    Infection Status as of 6/16/2025 9:13 AM: MRSA  Isolation Status as of 6/16/2025 9:13 AM: No active isolations     MDRO Discontinuation  Infection Prevention has reviewed this patient's chart per the MDRO D/C Policy and have taken the following action:    Patient meets all the criteria for discontinuation and Infection Prevention will resolve the MRSA infection status.    Contact Precautions discontinued for the following MDRO(s): MRSA    If you have any questions, please contact Infection Prevention.    Evelyn Newman, Infection Prevention

## 2025-06-22 ENCOUNTER — ANESTHESIA EVENT (OUTPATIENT)
Dept: GASTROENTEROLOGY | Facility: CLINIC | Age: 65
End: 2025-06-22
Payer: COMMERCIAL

## 2025-06-22 ASSESSMENT — LIFESTYLE VARIABLES: TOBACCO_USE: 1

## 2025-06-22 NOTE — H&P
[facility] History and Physical    Tee Escudero MRN# [id number]   Age: 65 year old YOB: 1960     Date of Admission:  (Not on file)    Home clinic: [facility]  Primary care provider: Jessie Dobbins          Impression and Plan:   Impression:   Screen for colon cancer [Z12.11]  No prior colonoscopy in system      Plan:   Proceed to Colonoscopy as planned.  The procedure, risks(bleeding, perforation), benefits and alternatives were discussed and the patient agrees to proceed. Cleared for Anesthesia             Chief Complaint:   Screen for colon cancer [Z12.11]    History is obtained from the patient          History of Present Illness:   This 65 year old male is being seen at this time for evaluation for colonoscopy.  No complaints or family hx           Past Medical History:     Past Medical History:   Diagnosis Date    Uveitis             Past Surgical History:     Past Surgical History:   Procedure Laterality Date    CYSTOSCOPY, RETROGRADES, COMBINED Left 1/27/2024    Procedure: Cystoscopy, retrogrades, combined;  Surgeon: Buck Allen MD;  Location: [facility] OR    INCISION AND DRAINAGE PERINEAL, COMBINED Left 1/27/2024    Procedure: Incision and drainage perineal, combined;  Surgeon: Buck Allen MD;  Location: [facility] OR    LASER HOLMIUM LITHOTRIPSY URETER(S), INSERT STENT, COMBINED Left 1/27/2024    Procedure: Cystoscopy, left retrograde pyelogram, interpretation of fluoroscopic images, left ureteroscopy with thulium laser lithotripsy and stone basketing, left ureteral stent placement. Incision and drainage of abscess of the left groin and perineum 22 modifier for difficult and lengthy case;  Surgeon: Buck Allen MD;  Location: [facility] OR    SOFT TISSUE SURGERY              Social History:     Social History     Tobacco Use    Smoking status: Some Days     Types: Cigarettes     Passive exposure: Never    Smokeless tobacco: Former      Types: Chew   Substance Use Topics    Alcohol use: Never            Family History:     Family History   Problem Relation Age of Onset    Lung Cancer Mother     Other Problems  (asbesto) Father             Immunizations:     VACCINE / DOSE   Diptheria   DPT   DTAP   HBIG   Hepatitis A   Hepatitis B   HIB   Influenza   Measles   Meningococcal   MMR   Mumps   Pneumococcal   Polio   Rubella   Small Pox   TDAP   Varicella   Zoster            Allergies:   No Known Allergies         Medications:     No current facility-administered medications for this encounter.     Current Outpatient Medications   Medication Sig Dispense Refill    ciprofloxacin-dexAMETHasone (CIPRODEX) 0.3-0.1 % otic suspension       dorzolamide-timolol (COSOPT) 2-0.5 % ophthalmic solution Place 1 drop into the right eye 3 times daily. 10 mL 5    metroNIDAZOLE (METROCREAM) 0.75 % external cream Apply topically 2 times daily. 45 g 1    nicotine (COMMIT) 2 MG lozenge Place 1 lozenge (2 mg) inside cheek every hour as needed for nicotine withdrawal symptoms. 24 lozenge 5    prednisoLONE acetate (PRED FORTE) 1 % ophthalmic suspension Place 1 drop into the right eye 3 times daily. 10 mL 5    varenicline (CHANTIX SHEBA) 0.5 MG X 11 & 1 MG X 42 tablet Take 0.5 mg tab daily for 3 days, THEN 0.5 mg tab twice daily for 4 days, THEN 1 mg twice daily. 53 tablet 0             Review of Systems:   The review of systems was positive for the following findings.  None.  The remainder of the review of systems was unremarkable.          Physical Exam:   All vitals have been reviewed  There were no vitals taken for this visit.  No intake or output data in the 24 hours ending 06/22/25 1003  SHEENT examination revealed NC/AT, EOMI.  Examination of the chest revealed CTA.  Examination of the heart revealed RRR.  Examination of the abdomen revealed Soft, non tender.  The neuromuscular examination was NL.          Data:   All laboratory data reviewed  Results for orders placed or  performed during the hospital encounter of 25   Echo Stress Echocardiogram     Status: None    Narrative    [id number]  [id number]  [id number]  [id number]^ABBY^MICHAEL     [facility],[place]  Echocardiography Laboratory  [address] [place] [id number]  Name: VIOLETTE RATLIFF  MRN: [id number]  : 1960  Study Date: 2025 09:47 AM  Age: 65 yrs  Gender: Male  Patient Location: [facility]  Reason For Study: Preop general physical exam, Exertional shortness of breath  Ordering Physician: MIHCAEL MORA  Referring Physician: MICHAEL MORA  Performed By: Phyllis Gracia     BSA: 2.2 m2  Height: 70 in  Weight: 218 lb  ______________________________________________________________________________  Procedure  Stress Echo Bike with two dimensional, color and spectral Doppler. Contrast  Definity.  ______________________________________________________________________________  Interpretation Summary  Slighly submaximal stress test.     Sensitivity of the test is reduced since target heart rate was not acheived.  Patient was only able to reach 81 % of the age predicted maximal heart rate  due to high BP and no ischemia was identified by imaging or ECG at this heart  rate.     Normal baseline limitedechocardiogram  Normal biventricular size, thickness, and global systolic function at  baseline, LVEF=55-60%.  No significant valvular abnormalities are noted on screening Doppler exam.  The aortic root and visualized ascending aorta are normal.  ______________________________________________________________________________  Stress  Definity (NDC #44659-213-94) given intravenously.  Patient was given 3ml mixture of 1.5ml Definity and 8.5ml saline.  7 ml wasted.  IV start location LAC .  Definity Expiration 2025 .  Definity Lot # 6366 .  Peak MVO2 18.2 ml/kg/min .  Percent predicted MVO2 72 %.  RPP 54214.  Maximum workload 125 cazares.  Exercise was stopped due to fatigue.  Target  Heart Rate was not achieved due to fatigue.     Stress Results                                       Maximum Predicted HR:   157 bpm             Target HR: 133 bpm        % Maximum Predicted HR: 81 %                             Stage DurationHeart Rate   BP                                 (mm:ss)   (bpm)                        Baseline  0:00      48     140/72                          Peak    8:00      127   205/100                          Stress Duration:   8:00 mm:ss                       Maximum Stress HR: 127 bpm  ______________________________________________________________________________  MMode/2D Measurements & Calculations  IVSd: 0.92 cm  LVIDd: 5.0 cm  LVIDs: 2.9 cm  LVPWd: 0.89 cm  FS: 42.0 %  LV mass(C)dI: 72.7 grams/m2  Ao root diam: 3.5 cm  asc Aorta Diam: 3.5 cm  LVOT diam: 2.1 cm  LVOT area: 3.6 cm2  Ao root diam index Ht(cm/m): 2.0  Ao root diam index BSA (cm/m2): 1.6  Asc Ao diam index BSA (cm/m2): 1.6     Asc Ao diam index Ht(cm/m): 2.0  RWT: 0.36     ______________________________________________________________________________  Report approved by: DONNELL EATON MD on 05/30/2025 10:46 AM           -     Zach Edwards MD, FACS

## 2025-06-23 ENCOUNTER — HOSPITAL ENCOUNTER (OUTPATIENT)
Facility: CLINIC | Age: 65
Discharge: HOME | End: 2025-06-23
Attending: SPECIALIST | Admitting: SPECIALIST
Payer: COMMERCIAL

## 2025-06-23 ENCOUNTER — ANESTHESIA (OUTPATIENT)
Dept: GASTROENTEROLOGY | Facility: CLINIC | Age: 65
End: 2025-06-23
Payer: COMMERCIAL

## 2025-06-23 VITALS
TEMPERATURE: 98.2 F | RESPIRATION RATE: 18 BRPM | OXYGEN SATURATION: 94 % | HEART RATE: 71 BPM | SYSTOLIC BLOOD PRESSURE: 151 MMHG | DIASTOLIC BLOOD PRESSURE: 94 MMHG

## 2025-06-23 LAB — COLONOSCOPY: NORMAL

## 2025-06-23 PROCEDURE — 250N000009 HC RX 250: Performed by: NURSE ANESTHETIST, CERTIFIED REGISTERED

## 2025-06-23 PROCEDURE — 45385 COLONOSCOPY W/LESION REMOVAL: CPT | Performed by: SPECIALIST

## 2025-06-23 PROCEDURE — 250N000011 HC RX IP 250 OP 636: Performed by: NURSE ANESTHETIST, CERTIFIED REGISTERED

## 2025-06-23 PROCEDURE — 258N000003 HC RX IP 258 OP 636: Performed by: NURSE ANESTHETIST, CERTIFIED REGISTERED

## 2025-06-23 PROCEDURE — 45380 COLONOSCOPY AND BIOPSY: CPT | Performed by: SPECIALIST

## 2025-06-23 PROCEDURE — 88305 TISSUE EXAM BY PATHOLOGIST: CPT | Mod: 26 | Performed by: PATHOLOGY

## 2025-06-23 PROCEDURE — 370N000017 HC ANESTHESIA TECHNICAL FEE, PER MIN: Performed by: SPECIALIST

## 2025-06-23 PROCEDURE — 88305 TISSUE EXAM BY PATHOLOGIST: CPT | Mod: TC | Performed by: SPECIALIST

## 2025-06-23 RX ORDER — PROPOFOL 10 MG/ML
INJECTION, EMULSION INTRAVENOUS CONTINUOUS PRN
Status: DISCONTINUED | OUTPATIENT
Start: 2025-06-23 | End: 2025-06-23

## 2025-06-23 RX ORDER — ONDANSETRON 2 MG/ML
4 INJECTION INTRAMUSCULAR; INTRAVENOUS EVERY 30 MIN PRN
Status: DISCONTINUED | OUTPATIENT
Start: 2025-06-23 | End: 2025-06-23 | Stop reason: HOSPADM

## 2025-06-23 RX ORDER — SODIUM CHLORIDE, SODIUM LACTATE, POTASSIUM CHLORIDE, CALCIUM CHLORIDE 600; 310; 30; 20 MG/100ML; MG/100ML; MG/100ML; MG/100ML
INJECTION, SOLUTION INTRAVENOUS CONTINUOUS PRN
Status: DISCONTINUED | OUTPATIENT
Start: 2025-06-23 | End: 2025-06-23

## 2025-06-23 RX ORDER — ONDANSETRON 4 MG/1
4 TABLET, ORALLY DISINTEGRATING ORAL EVERY 30 MIN PRN
Status: DISCONTINUED | OUTPATIENT
Start: 2025-06-23 | End: 2025-06-23 | Stop reason: HOSPADM

## 2025-06-23 RX ORDER — SODIUM CHLORIDE, SODIUM LACTATE, POTASSIUM CHLORIDE, CALCIUM CHLORIDE 600; 310; 30; 20 MG/100ML; MG/100ML; MG/100ML; MG/100ML
INJECTION, SOLUTION INTRAVENOUS CONTINUOUS
Status: DISCONTINUED | OUTPATIENT
Start: 2025-06-23 | End: 2025-06-23 | Stop reason: HOSPADM

## 2025-06-23 RX ORDER — NALOXONE HYDROCHLORIDE 0.4 MG/ML
0.1 INJECTION, SOLUTION INTRAMUSCULAR; INTRAVENOUS; SUBCUTANEOUS
Status: DISCONTINUED | OUTPATIENT
Start: 2025-06-23 | End: 2025-06-23 | Stop reason: HOSPADM

## 2025-06-23 RX ORDER — GLYCOPYRROLATE 0.2 MG/ML
INJECTION, SOLUTION INTRAMUSCULAR; INTRAVENOUS PRN
Status: DISCONTINUED | OUTPATIENT
Start: 2025-06-23 | End: 2025-06-23

## 2025-06-23 RX ORDER — DEXAMETHASONE SODIUM PHOSPHATE 10 MG/ML
4 INJECTION, SOLUTION INTRAMUSCULAR; INTRAVENOUS
Status: DISCONTINUED | OUTPATIENT
Start: 2025-06-23 | End: 2025-06-23 | Stop reason: HOSPADM

## 2025-06-23 RX ORDER — LIDOCAINE 40 MG/G
CREAM TOPICAL
Status: DISCONTINUED | OUTPATIENT
Start: 2025-06-23 | End: 2025-06-23 | Stop reason: HOSPADM

## 2025-06-23 RX ORDER — LIDOCAINE HYDROCHLORIDE 10 MG/ML
INJECTION, SOLUTION INFILTRATION; PERINEURAL PRN
Status: DISCONTINUED | OUTPATIENT
Start: 2025-06-23 | End: 2025-06-23

## 2025-06-23 RX ADMIN — PROPOFOL 100 MG: 10 INJECTION, EMULSION INTRAVENOUS at 09:16

## 2025-06-23 RX ADMIN — GLYCOPYRROLATE 0.1 MCG: 0.2 INJECTION, SOLUTION INTRAMUSCULAR; INTRAVENOUS at 09:15

## 2025-06-23 RX ADMIN — SODIUM CHLORIDE, SODIUM LACTATE, POTASSIUM CHLORIDE, AND CALCIUM CHLORIDE: .6; .31; .03; .02 INJECTION, SOLUTION INTRAVENOUS at 09:15

## 2025-06-23 RX ADMIN — PROPOFOL 200 MCG/KG/MIN: 10 INJECTION, EMULSION INTRAVENOUS at 09:17

## 2025-06-23 RX ADMIN — SODIUM CHLORIDE, SODIUM LACTATE, POTASSIUM CHLORIDE, AND CALCIUM CHLORIDE: .6; .31; .03; .02 INJECTION, SOLUTION INTRAVENOUS at 08:46

## 2025-06-23 RX ADMIN — LIDOCAINE HYDROCHLORIDE 30 MG: 10 INJECTION, SOLUTION INFILTRATION; PERINEURAL at 09:16

## 2025-06-23 ASSESSMENT — ACTIVITIES OF DAILY LIVING (ADL)
ADLS_ACUITY_SCORE: 51

## 2025-06-23 NOTE — ANESTHESIA PREPROCEDURE EVALUATION
Anesthesia Pre-Procedure Evaluation    Patient: Tee Escudero   MRN: [id number] : 1960          Procedure : Procedure(s):  Colonoscopy, Screening         Past Medical History:   Diagnosis Date    Uveitis       Past Surgical History:   Procedure Laterality Date    CYSTOSCOPY, RETROGRADES, COMBINED Left 2024    Procedure: Cystoscopy, retrogrades, combined;  Surgeon: Buck Allen MD;  Location: [facility] OR    INCISION AND DRAINAGE PERINEAL, COMBINED Left 2024    Procedure: Incision and drainage perineal, combined;  Surgeon: Buck Allen MD;  Location: [facility] OR    LASER HOLMIUM LITHOTRIPSY URETER(S), INSERT STENT, COMBINED Left 2024    Procedure: Cystoscopy, left retrograde pyelogram, interpretation of fluoroscopic images, left ureteroscopy with thulium laser lithotripsy and stone basketing, left ureteral stent placement. Incision and drainage of abscess of the left groin and perineum 22 modifier for difficult and lengthy case;  Surgeon: Buck Allen MD;  Location: [facility] OR    SOFT TISSUE SURGERY        No Known Allergies   Social History     Tobacco Use    Smoking status: Some Days     Types: Cigarettes     Passive exposure: Never    Smokeless tobacco: Former     Types: Chew   Substance Use Topics    Alcohol use: Never      Wt Readings from Last 1 Encounters:   25 101.2 kg (223 lb 1.6 oz)        Anesthesia Evaluation   Pt has had prior anesthetic. Type: MAC and General.        ROS/MED HX  ENT/Pulmonary:     (+)                tobacco use, Current use,     asthma                  Neurologic:  - neg neurologic ROS     Cardiovascular:     (+)  - -   -  - -                                 Previous cardiac testing   Echo: Date: Results:    Stress Test:  Date: 2025 Results:  Sensitivity of the test is reduced since target heart rate was not acheived.  Patient was only able to reach 81 % of the age predicted maximal heart rate  due to high BP and no ischemia was  "identified by imaging or ECG at this heart  rate.     Normal baseline limitedechocardiogram  Normal biventricular size, thickness, and global systolic function at  baseline, LVEF=55-60%.  No significant valvular abnormalities are noted on screening Doppler exam.  The aortic root and visualized ascending aorta are normal.    ECG Reviewed:  Date: 5/14/2025 Results:  SB  Cath:  Date: Results:      METS/Exercise Tolerance:     Hematologic:  - neg hematologic  ROS     Musculoskeletal:  - neg musculoskeletal ROS     GI/Hepatic:     (+)        bowel prep,            Renal/Genitourinary:     (+)       Nephrolithiasis ,       Endo:       Psychiatric/Substance Use:  - neg psychiatric ROS     Infectious Disease:  - neg infectious disease ROS     Malignancy:  - neg malignancy ROS     Other:              Physical Exam  Airway  Mallampati: II  TM distance: >3 FB  Neck ROM: full  Mouth opening: >= 4 cm    Cardiovascular - normal exam  Rhythm: regular  Rate: normal rate     Dental   (+) Edentulous      Pulmonary - normal examBreath sounds clear to auscultation        Neurological - normal exam  He appears awake, alert and oriented x3.    Other Findings       OUTSIDE LABS:  CBC:   Lab Results   Component Value Date    WBC 8.7 05/14/2025    WBC 6.8 06/01/2024    HGB 17.9 (H) 05/14/2025    HGB 15.8 06/01/2024    HCT 53.7 (H) 05/14/2025    HCT 45.8 06/01/2024     05/14/2025     06/01/2024     BMP:   Lab Results   Component Value Date     06/01/2024     05/30/2024    POTASSIUM 4.1 06/01/2024    POTASSIUM 4.1 05/30/2024    CHLORIDE 101 06/01/2024    CHLORIDE 103 05/30/2024    CO2 26 06/01/2024    CO2 25 05/30/2024    BUN 13.7 06/01/2024    BUN 18.1 05/30/2024    CR 1.60 (H) 06/01/2024    CR 1.61 (H) 05/30/2024     (H) 06/01/2024     (H) 05/30/2024     COAGS:   Lab Results   Component Value Date    INR 1.10 01/26/2024     POC: No results found for: \"BGM\", \"HCG\", \"HCGS\"  HEPATIC:   Lab Results " "  Component Value Date    ALBUMIN 4.2 05/30/2024    PROTTOTAL 6.6 05/30/2024    ALT 40 05/30/2024    AST 34 05/30/2024    ALKPHOS 107 05/30/2024    BILITOTAL 0.4 05/30/2024     OTHER:   Lab Results   Component Value Date    LACT 1.0 01/25/2024    TEMITOPE 8.8 06/01/2024    SED 2 05/30/2024       Anesthesia Plan    ASA Status:  2      NPO Status: NPO Appropriate   Anesthesia Type: MAC.  Airway: natural airway.  Induction: intravenous.  Maintenance: TIVA.   Techniques and Equipment:       - Monitoring Plan: standard ASA monitoring     Consents    Anesthesia Plan(s) and associated risks, benefits, and realistic alternatives discussed. Questions answered and patient/representative(s) expressed understanding.     - Discussed: surgeon     - Discussed with:  Patient        - Pt is DNR/DNI Status: no DNR     Blood Consent:      - Discussed with: patient.     - Consented: consented to blood products     Postoperative Care    Pain management: non-narcotic analgesics.     Comments:    Other Comments: The risks and benefits of anesthesia, and the alternatives where applicable, have been discussed with the patient, and they wish to proceed.                  MARIANN Ruff CRNA    I have reviewed the pertinent notes and labs in the chart from the past 30 days and (re)examined the patient.  Any updates or changes from those notes are reflected in this note.    Clinically Significant Risk Factors Present on Admission                             # Obesity: Estimated body mass index is 31.12 kg/m  as calculated from the following:    Height as of 6/6/25: 1.803 m (5' 11\").    Weight as of 6/6/25: 101.2 kg (223 lb 1.6 oz).                    "

## 2025-06-23 NOTE — DISCHARGE INSTRUCTIONS
[facility]    Home Care Following Endoscopy          Activity:  You have just undergone an endoscopic procedure usually performed with conscious sedation.  Do not work or operate machinery (including a car) for at least 12 hours.    I encourage you to walk and attempt to pass this air as soon as possible.    Diet:  Return to the diet you were on before your procedure but eat lightly for the first 12-24 hours.  Drink plenty of water.  Resume any regular medications unless otherwise advised by your physician.  Please begin any new medication prescribed as a result of your procedure as directed by your physician.   If you had any biopsy or polyp removed please refrain from aspirin or aspirin products for 2 days.  If on Coumadin please restart as instructed by your physician.   Pain:  You may take Tylenol as needed for pain.  Expected Recovery:  You can expect some mild abdominal fullness and/or discomfort due to the air used to inflate your intestinal tract. It is also normal to have a mild sore throat after upper endoscopy.    Call Your Physician if You Have:  After Upper Endoscopy:  Shoulder, back or chest pain.  Difficulty breathing or swallowing.  Vomiting blood.  After Colonoscopy:  Worsening persisting abdominal pain which is worse with activity.  Fevers (>101 degrees F), chills or shakes.  Passage of continued blood with bowel movements.     Any questions or concerns about your recovery, please call [phone] or after hours [phone] ([phone]) Nurse Advice Line.    Follow-up Care:  You MAY have had polyps/biopsy tissue sample(s) removed.  The polyps/biopsy tissue sample(s) will be sent to pathology.    You should receive letter in your My Chart from Dr. Edwards with your results within 1-2 weeks. If you do not participate in My Chart a physical letter will come in the mail in 2-3 weeks.  Please call if you have not received a notification of your results.  If asked to return to  clinic please make an appointment 1 week after your procedure.  Call [phone].     ESME Valle

## 2025-06-23 NOTE — ANESTHESIA POSTPROCEDURE EVALUATION
Patient: Tee Escudero    Procedure: Procedure(s):  COLONOSCOPY, FLEXIBLE, WITH LESION REMOVAL USING SNARE       Anesthesia Type:  MAC    Note:  Disposition: Outpatient   Postop Pain Control: Uneventful            Sign Out: Well controlled pain   PONV: No   Neuro/Psych: Uneventful            Sign Out: Acceptable/Baseline neuro status   Airway/Respiratory: Uneventful            Sign Out: Acceptable/Baseline resp. status   CV/Hemodynamics: Uneventful            Sign Out: Acceptable CV status   Other NRE: NONE   DID A NON-ROUTINE EVENT OCCUR? No    Event details/Postop Comments:  Pt was happy with anesthesia care.  No complications.  I will follow up with the pt if needed.       Last vitals:  Vitals Value Taken Time   /80 06/23/25 10:00   Temp     Pulse 68 06/23/25 10:00   Resp 18 06/23/25 10:00   SpO2 93 % 06/23/25 10:03   Vitals shown include unfiled device data.    Electronically Signed By: MARIANN Ruff CRNA  June 23, 2025  10:10 AM

## 2025-06-23 NOTE — ANESTHESIA CARE TRANSFER NOTE
Patient: Tee Escudero    Procedure: Procedure(s):  COLONOSCOPY, FLEXIBLE, WITH LESION REMOVAL USING SNARE       Diagnosis: Screen for colon cancer [Z12.11]  Diagnosis Additional Information: No value filed.    Anesthesia Type:   MAC     Note:    Oropharynx: oropharynx clear of all foreign objects and spontaneously breathing  Level of Consciousness: drowsy  Oxygen Supplementation: room air    Independent Airway: airway patency satisfactory and stable  Dentition: dentition unchanged  Vital Signs Stable: post-procedure vital signs reviewed and stable  Report to RN Given: handoff report given  Patient transferred to: Phase II    Handoff Report: Identifed the Patient, Identified the Reponsible Provider, Reviewed the pertinent medical history, Discussed the surgical course, Reviewed Intra-OP anesthesia mangement and issues during anesthesia, Set expectations for post-procedure period and Allowed opportunity for questions and acknowledgement of understanding  Vitals:  Vitals Value Taken Time   /80 06/23/25 10:00   Temp     Pulse 68 06/23/25 10:00   Resp 18 06/23/25 10:00   SpO2 93 % 06/23/25 10:03   Vitals shown include unfiled device data.    Electronically Signed By: MARIANN Ruff CRNA  June 23, 2025  10:05 AM

## 2025-06-30 LAB
PATH REPORT.COMMENTS IMP SPEC: NORMAL
PATH REPORT.COMMENTS IMP SPEC: NORMAL
PATH REPORT.FINAL DX SPEC: NORMAL
PATH REPORT.GROSS SPEC: NORMAL
PATH REPORT.MICROSCOPIC SPEC OTHER STN: NORMAL
PATH REPORT.RELEVANT HX SPEC: NORMAL
PHOTO IMAGE: NORMAL

## 2025-07-01 ENCOUNTER — HOSPITAL ENCOUNTER (EMERGENCY)
Facility: CLINIC | Age: 65
Discharge: HOME | End: 2025-07-01
Attending: EMERGENCY MEDICINE | Admitting: EMERGENCY MEDICINE
Payer: COMMERCIAL

## 2025-07-01 VITALS
OXYGEN SATURATION: 94 % | DIASTOLIC BLOOD PRESSURE: 92 MMHG | HEIGHT: 70 IN | BODY MASS INDEX: 30.92 KG/M2 | TEMPERATURE: 97.6 F | SYSTOLIC BLOOD PRESSURE: 152 MMHG | HEART RATE: 70 BPM | RESPIRATION RATE: 20 BRPM | WEIGHT: 216 LBS

## 2025-07-01 DIAGNOSIS — H92.02 LEFT EAR PAIN: ICD-10-CM

## 2025-07-01 PROCEDURE — 99283 EMERGENCY DEPT VISIT LOW MDM: CPT | Performed by: EMERGENCY MEDICINE

## 2025-07-01 PROCEDURE — 99283 EMERGENCY DEPT VISIT LOW MDM: CPT

## 2025-07-01 RX ORDER — CIPROFLOXACIN AND DEXAMETHASONE 3; 1 MG/ML; MG/ML
4 SUSPENSION/ DROPS AURICULAR (OTIC) 2 TIMES DAILY
Qty: 7.5 ML | Refills: 0 | Status: SHIPPED | OUTPATIENT
Start: 2025-07-01 | End: 2025-07-11

## 2025-07-01 ASSESSMENT — ACTIVITIES OF DAILY LIVING (ADL)
ADLS_ACUITY_SCORE: 51
ADLS_ACUITY_SCORE: 51

## 2025-07-01 NOTE — ED TRIAGE NOTES
"Patient reports ear surgery 6/5 in [place], returned 6/24 for follow up and now feels like \"something collapsed\"  and ear drops will not soak in and was advised to come here to have it looked at.         "

## 2025-07-01 NOTE — ED PROVIDER NOTES
History     Chief Complaint   Patient presents with    Post-op Problem    Otalgia     HPI  Tee Escudero is a 65 year old male who presents for evaluation of some left ear fullness.  He underwent tympanoplasty on June 5, 2020 6 days ago in [place] with Dr. Osborn.  He was prescribed Ciprodex drops on June 24 by the paperwork he presents.  He was to be on these 2 weeks but ran out of them.  He has experienced some increased fullness and pain.  No fever.  He states he is having trouble getting drops in the ear.      Allergies:  No Known Allergies    Problem List:    Patient Active Problem List    Diagnosis Date Noted    Kidney stone 05/14/2025     Priority: Medium    Cigarette nicotine dependence without complication 05/14/2025     Priority: Medium    Cholesteatoma, left 05/14/2025     Priority: Medium    Perforation of right tympanic membrane 05/14/2025     Priority: Medium    Cellulitis of perineum 01/26/2024     Priority: Medium        Past Medical History:    Past Medical History:   Diagnosis Date    Uveitis        Past Surgical History:    Past Surgical History:   Procedure Laterality Date    COLONOSCOPY N/A 6/23/2025    Procedure: COLONOSCOPY, FLEXIBLE, WITH LESION REMOVAL USING SNARE;  Surgeon: Zach Edwards MD;  Location:  GI    COLONOSCOPY N/A 6/23/2025    Procedure: COLONOSCOPY, WITH POLYPECTOMY AND BIOPSY;  Surgeon: Zach Edwards MD;  Location:  GI    CYSTOSCOPY, RETROGRADES, COMBINED Left 1/27/2024    Procedure: Cystoscopy, retrogrades, combined;  Surgeon: Buck Allen MD;  Location:  OR    INCISION AND DRAINAGE PERINEAL, COMBINED Left 1/27/2024    Procedure: Incision and drainage perineal, combined;  Surgeon: Buck Allen MD;  Location:  OR    LASER HOLMIUM LITHOTRIPSY URETER(S), INSERT STENT, COMBINED Left 1/27/2024    Procedure: Cystoscopy, left retrograde pyelogram, interpretation of fluoroscopic images, left ureteroscopy with thulium laser lithotripsy and  "stone basketing, left ureteral stent placement. Incision and drainage of abscess of the left groin and perineum 22 modifier for difficult and lengthy case;  Surgeon: Buck Allen MD;  Location: [facility] OR    SOFT TISSUE SURGERY         Family History:    Family History   Problem Relation Age of Onset    Lung Cancer Mother     Other Problems  (asbesto) Father        Social History:  Marital Status:  Single [1]  Social History     Tobacco Use    Smoking status: Some Days     Types: Cigarettes     Passive exposure: Never    Smokeless tobacco: Former     Types: Chew   Vaping Use    Vaping status: Never Used   Substance Use Topics    Alcohol use: Never    Drug use: Never        Medications:    ciprofloxacin-dexAMETHasone (CIPRODEX) 0.3-0.1 % otic suspension  ciprofloxacin-dexAMETHasone (CIPRODEX) 0.3-0.1 % otic suspension  dorzolamide-timolol (COSOPT) 2-0.5 % ophthalmic solution  metroNIDAZOLE (METROCREAM) 0.75 % external cream  nicotine (COMMIT) 2 MG lozenge  prednisoLONE acetate (PRED FORTE) 1 % ophthalmic suspension  varenicline (CHANTIX SHEBA) 0.5 MG X 11 & 1 MG X 42 tablet          Review of Systems  All other systems are reviewed and are negative    Physical Exam   BP: (!) 132/96  Pulse: 70  Temp: 97.6  F (36.4  C)  Resp: 20  Height: 177.8 cm (5' 10\")  Weight: 98 kg (216 lb)  SpO2: 97 %      Physical Exam  Vitals reviewed.   Constitutional:       General: He is not in acute distress.     Appearance: He is not diaphoretic.   HENT:      Head: Normocephalic and atraumatic.      Comments: Left external auditory canal reveals some inflammation with a mass of yellowish fullness in the inferior aspect.  Canal otherwise appears erythematous.  No discharge.  Canal is widely open  Eyes:      General: No scleral icterus.        Right eye: No discharge.         Left eye: No discharge.      Conjunctiva/sclera: Conjunctivae normal.   Pulmonary:      Effort: Pulmonary effort is normal.      Breath sounds: No stridor. "   Musculoskeletal:         General: Normal range of motion.      Cervical back: Normal range of motion.   Skin:     General: Skin is warm and dry.      Findings: No rash.   Neurological:      Mental Status: He is alert.      Comments: Normal speech and mentation   Psychiatric:         Judgment: Judgment normal.         ED Course        Procedures                  No results found for this or any previous visit (from the past 24 hours).    Medications - No data to display    Assessments & Plan (with Medical Decision Making)  65-year-old male who underwent left mastoid reconstruction per the ENT nurse with Dr. Osborn in [place] in early June.  He has some ongoing fullness and pain.  It had been recommended he be on Ciprodex drops.  I do not see signs of an acute emergency.  Have recommended continuing with the Ciprodex drops and refilled those.  Dr. Osborn's team said they would fit him in in the next 2 days.     I have reviewed the nursing notes.    I have reviewed the findings, diagnosis, plan and need for follow up with the patient.          Discharge Medication List as of 7/1/2025 12:01 PM          Final diagnoses:   Left ear pain       7/1/2025   [facility] EMERGENCY DEPT       Freedom Almaguer MD  07/01/25 1203       Freedom Almaguer MD  07/01/25 1222

## 2025-07-07 PROBLEM — D12.6 ADENOMATOUS POLYP OF COLON: Status: ACTIVE | Noted: 2025-07-07

## 2025-08-04 ENCOUNTER — OFFICE VISIT (OUTPATIENT)
Dept: FAMILY MEDICINE | Facility: CLINIC | Age: 65
End: 2025-08-04
Payer: COMMERCIAL

## 2025-08-04 VITALS
BODY MASS INDEX: 30.53 KG/M2 | OXYGEN SATURATION: 97 % | HEIGHT: 70 IN | HEART RATE: 84 BPM | RESPIRATION RATE: 19 BRPM | TEMPERATURE: 98.2 F | DIASTOLIC BLOOD PRESSURE: 84 MMHG | WEIGHT: 213.25 LBS | SYSTOLIC BLOOD PRESSURE: 132 MMHG

## 2025-08-04 DIAGNOSIS — Z23 NEED FOR VACCINATION: ICD-10-CM

## 2025-08-04 DIAGNOSIS — R06.09 DOE (DYSPNEA ON EXERTION): ICD-10-CM

## 2025-08-04 DIAGNOSIS — Z13.220 SCREENING FOR HYPERLIPIDEMIA: Primary | ICD-10-CM

## 2025-08-04 DIAGNOSIS — Z13.1 SCREENING FOR DIABETES MELLITUS: ICD-10-CM

## 2025-08-04 DIAGNOSIS — F17.200 NICOTINE DEPENDENCE, UNCOMPLICATED, UNSPECIFIED NICOTINE PRODUCT TYPE: ICD-10-CM

## 2025-08-04 PROCEDURE — G2211 COMPLEX E/M VISIT ADD ON: HCPCS | Performed by: PHYSICIAN ASSISTANT

## 2025-08-04 PROCEDURE — 90677 PCV20 VACCINE IM: CPT | Performed by: PHYSICIAN ASSISTANT

## 2025-08-04 PROCEDURE — 99213 OFFICE O/P EST LOW 20 MIN: CPT | Mod: 25 | Performed by: PHYSICIAN ASSISTANT

## 2025-08-04 PROCEDURE — 90471 IMMUNIZATION ADMIN: CPT | Performed by: PHYSICIAN ASSISTANT

## 2025-08-04 ASSESSMENT — PATIENT HEALTH QUESTIONNAIRE - PHQ9
SUM OF ALL RESPONSES TO PHQ QUESTIONS 1-9: 0
SUM OF ALL RESPONSES TO PHQ QUESTIONS 1-9: 0
10. IF YOU CHECKED OFF ANY PROBLEMS, HOW DIFFICULT HAVE THESE PROBLEMS MADE IT FOR YOU TO DO YOUR WORK, TAKE CARE OF THINGS AT HOME, OR GET ALONG WITH OTHER PEOPLE: NOT DIFFICULT AT ALL

## 2025-08-04 ASSESSMENT — PAIN SCALES - GENERAL: PAINLEVEL_OUTOF10: MILD PAIN (2)

## 2025-08-10 ENCOUNTER — HEALTH MAINTENANCE LETTER (OUTPATIENT)
Age: 65
End: 2025-08-10

## 2025-08-14 ENCOUNTER — RESULTS FOLLOW-UP (OUTPATIENT)
Dept: SURGERY | Facility: CLINIC | Age: 65
End: 2025-08-14
Payer: COMMERCIAL

## (undated) DEVICE — GLOVE BIOGEL PI ULTRATOUCH SZ 7.5 41175

## (undated) DEVICE — BASKET RETRIEVAL 1.9FRX120CM ESCAPE NTNL 4 WIRE 390-201

## (undated) DEVICE — GUIDEWIRE URO STR STIFF .035"X150CM NITINOL 150NSS35

## (undated) DEVICE — ENDO TRAP POLYP E-TRAP 00711099

## (undated) DEVICE — ENDO SNARE EXACTO COLD 9MM LOOP 2.4MMX230CM 00711115

## (undated) DEVICE — LINEN HALF SHEET 5512

## (undated) DEVICE — SOL NACL 0.9% IRRIG 3000ML BAG 2B7477

## (undated) DEVICE — TUBING SUCTION 6"X3/16" N56A

## (undated) DEVICE — SYR BULB IRRIG DOVER 60 ML LATEX FREE 67000

## (undated) DEVICE — FIBER LASER THULIUM 365 UM DISPOSABLE TFL-FBX365S

## (undated) DEVICE — COVER FOOTSWITCH W/CINCH 20X24" 923267

## (undated) DEVICE — FIBER LASER 200 UM DISPOSABLE TFL-FBX200S

## (undated) DEVICE — BAG CLEAR TRASH 1.3M 39X33" P4040C

## (undated) DEVICE — SOL WATER IRRIG 1000ML BOTTLE 2F7114

## (undated) DEVICE — BLADE KNIFE SURG 15 371115

## (undated) DEVICE — CATH TRAY FOLEY COUDE SURESTEP 16FR W/DRN BAG LATEX A304416A

## (undated) DEVICE — RAD RX ISOVUE 300 (50ML) 61% IOPAMIDOL CHARGE PER ML

## (undated) DEVICE — TUBING IRRIG TUR Y TYPE 96" LF 6543-01

## (undated) DEVICE — KIT ENDO TURNOVER/PROCEDURE CARRY-ON 101822

## (undated) DEVICE — PACK CYSTO CUSTOM RIDGES

## (undated) DEVICE — SOLUTION WATER 1000ML BOTTLE R5000-01

## (undated) DEVICE — LINEN FULL SHEET 5511

## (undated) DEVICE — CATH URETERAL FLEX TIP TIGERTAIL 06FRX70CM 139006

## (undated) DEVICE — PREP SCRUB SOL EXIDINE 4% CHG 4OZ 29002-404

## (undated) RX ORDER — CEFAZOLIN SODIUM/WATER 2 G/20 ML
SYRINGE (ML) INTRAVENOUS
Status: DISPENSED
Start: 2024-01-27

## (undated) RX ORDER — FENTANYL CITRATE 50 UG/ML
INJECTION, SOLUTION INTRAMUSCULAR; INTRAVENOUS
Status: DISPENSED
Start: 2024-01-27

## (undated) RX ORDER — BUPIVACAINE HYDROCHLORIDE 2.5 MG/ML
INJECTION, SOLUTION EPIDURAL; INFILTRATION; INTRACAUDAL
Status: DISPENSED
Start: 2024-01-27

## (undated) RX ORDER — KETOROLAC TROMETHAMINE 30 MG/ML
INJECTION, SOLUTION INTRAMUSCULAR; INTRAVENOUS
Status: DISPENSED
Start: 2024-01-27